# Patient Record
Sex: FEMALE | Race: WHITE | Employment: FULL TIME | URBAN - METROPOLITAN AREA
[De-identification: names, ages, dates, MRNs, and addresses within clinical notes are randomized per-mention and may not be internally consistent; named-entity substitution may affect disease eponyms.]

---

## 2017-01-04 ENCOUNTER — OFFICE VISIT (OUTPATIENT)
Dept: FAMILY MEDICINE CLINIC | Age: 52
End: 2017-01-04

## 2017-01-04 ENCOUNTER — TELEPHONE (OUTPATIENT)
Dept: FAMILY MEDICINE CLINIC | Age: 52
End: 2017-01-04

## 2017-01-04 VITALS
TEMPERATURE: 98 F | DIASTOLIC BLOOD PRESSURE: 76 MMHG | HEIGHT: 64 IN | WEIGHT: 158 LBS | SYSTOLIC BLOOD PRESSURE: 124 MMHG | HEART RATE: 70 BPM | BODY MASS INDEX: 26.98 KG/M2 | RESPIRATION RATE: 16 BRPM

## 2017-01-04 DIAGNOSIS — L23.9 ALLERGIC DERMATITIS: ICD-10-CM

## 2017-01-04 DIAGNOSIS — L30.9 ECZEMA, UNSPECIFIED TYPE: Primary | ICD-10-CM

## 2017-01-04 PROCEDURE — 99214 OFFICE O/P EST MOD 30 MIN: CPT | Performed by: FAMILY MEDICINE

## 2017-01-04 RX ORDER — METHYLPREDNISOLONE 4 MG/1
TABLET ORAL
Qty: 1 KIT | Refills: 0 | Status: SHIPPED | OUTPATIENT
Start: 2017-01-04 | End: 2017-01-14

## 2017-01-05 ENCOUNTER — PATIENT MESSAGE (OUTPATIENT)
Dept: FAMILY MEDICINE CLINIC | Age: 52
End: 2017-01-05

## 2017-01-06 RX ORDER — SCOLOPAMINE TRANSDERMAL SYSTEM 1 MG/1
1 PATCH, EXTENDED RELEASE TRANSDERMAL
Qty: 10 PATCH | Refills: 0 | Status: SHIPPED | OUTPATIENT
Start: 2017-01-06 | End: 2017-02-02 | Stop reason: ALTCHOICE

## 2017-01-06 RX ORDER — SCOLOPAMINE TRANSDERMAL SYSTEM 1 MG/1
1 PATCH, EXTENDED RELEASE TRANSDERMAL
Qty: 10 PATCH | Refills: 0 | Status: SHIPPED | OUTPATIENT
Start: 2017-01-06 | End: 2017-01-06

## 2017-01-12 ENCOUNTER — PATIENT MESSAGE (OUTPATIENT)
Dept: FAMILY MEDICINE CLINIC | Age: 52
End: 2017-01-12

## 2017-01-12 RX ORDER — PREDNISONE 10 MG/1
TABLET ORAL
Qty: 39 TABLET | Refills: 0 | Status: SHIPPED | OUTPATIENT
Start: 2017-01-12 | End: 2017-01-24

## 2017-01-30 ENCOUNTER — TELEPHONE (OUTPATIENT)
Dept: DERMATOLOGY | Age: 52
End: 2017-01-30

## 2017-02-02 ENCOUNTER — OFFICE VISIT (OUTPATIENT)
Dept: DERMATOLOGY | Age: 52
End: 2017-02-02

## 2017-02-02 DIAGNOSIS — L30.9 DERMATITIS: Primary | ICD-10-CM

## 2017-02-02 PROCEDURE — 99214 OFFICE O/P EST MOD 30 MIN: CPT | Performed by: DERMATOLOGY

## 2017-02-02 RX ORDER — FLUOCINONIDE 0.5 MG/G
OINTMENT TOPICAL
Qty: 60 G | Refills: 1 | Status: SHIPPED | OUTPATIENT
Start: 2017-02-02 | End: 2020-03-05 | Stop reason: SDUPTHER

## 2017-02-08 RX ORDER — FLUTICASONE PROPIONATE 110 UG/1
1 AEROSOL, METERED RESPIRATORY (INHALATION) PRN
Qty: 1 INHALER | Refills: 1 | Status: SHIPPED | OUTPATIENT
Start: 2017-02-08 | End: 2019-02-07 | Stop reason: SDUPTHER

## 2017-02-09 ENCOUNTER — TELEPHONE (OUTPATIENT)
Dept: FAMILY MEDICINE CLINIC | Age: 52
End: 2017-02-09

## 2017-02-09 DIAGNOSIS — J20.9 ACUTE BRONCHITIS, UNSPECIFIED ORGANISM: ICD-10-CM

## 2017-02-09 RX ORDER — DEXTROMETHORPHAN HYDROBROMIDE AND PROMETHAZINE HYDROCHLORIDE 15; 6.25 MG/5ML; MG/5ML
5 SYRUP ORAL 4 TIMES DAILY PRN
Qty: 240 ML | Refills: 0 | Status: SHIPPED | OUTPATIENT
Start: 2017-02-09 | End: 2017-02-16

## 2017-02-24 DIAGNOSIS — J45.21 ASTHMATIC BRONCHITIS, MILD INTERMITTENT, WITH ACUTE EXACERBATION: ICD-10-CM

## 2017-02-24 RX ORDER — MONTELUKAST SODIUM 10 MG/1
TABLET ORAL
Qty: 30 TABLET | Refills: 5 | Status: SHIPPED | OUTPATIENT
Start: 2017-02-24 | End: 2017-09-06 | Stop reason: SDUPTHER

## 2017-04-18 ENCOUNTER — PATIENT MESSAGE (OUTPATIENT)
Dept: FAMILY MEDICINE CLINIC | Age: 52
End: 2017-04-18

## 2017-04-19 ENCOUNTER — PATIENT MESSAGE (OUTPATIENT)
Dept: FAMILY MEDICINE CLINIC | Age: 52
End: 2017-04-19

## 2017-04-19 DIAGNOSIS — E03.9 ACQUIRED UNDERACTIVE THYROID: ICD-10-CM

## 2017-04-20 PROBLEM — E03.9 ACQUIRED UNDERACTIVE THYROID: Status: ACTIVE | Noted: 2017-04-20

## 2017-04-20 RX ORDER — LIOTHYRONINE SODIUM 5 UG/1
5 TABLET ORAL DAILY
Qty: 90 TABLET | Refills: 1 | Status: SHIPPED | OUTPATIENT
Start: 2017-04-20 | End: 2017-11-02 | Stop reason: DRUGHIGH

## 2017-05-02 ENCOUNTER — OFFICE VISIT (OUTPATIENT)
Dept: DERMATOLOGY | Age: 52
End: 2017-05-02

## 2017-05-02 DIAGNOSIS — Z12.83 SCREENING EXAM FOR SKIN CANCER: ICD-10-CM

## 2017-05-02 DIAGNOSIS — D22.9 MULTIPLE BENIGN NEVI: ICD-10-CM

## 2017-05-02 DIAGNOSIS — L30.9 DERMATITIS: Primary | ICD-10-CM

## 2017-05-02 PROCEDURE — 99213 OFFICE O/P EST LOW 20 MIN: CPT | Performed by: DERMATOLOGY

## 2017-05-16 ENCOUNTER — EMPLOYEE WELLNESS (OUTPATIENT)
Dept: OTHER | Age: 52
End: 2017-05-16

## 2017-05-16 LAB
CHOLESTEROL, TOTAL: 228 MG/DL (ref 0–199)
GLUCOSE BLD-MCNC: 89 MG/DL (ref 70–99)
HDLC SERPL-MCNC: 75 MG/DL (ref 40–60)
LDL CHOLESTEROL CALCULATED: 128 MG/DL
TRIGL SERPL-MCNC: 123 MG/DL (ref 0–150)

## 2017-09-06 DIAGNOSIS — J45.21 ASTHMATIC BRONCHITIS, MILD INTERMITTENT, WITH ACUTE EXACERBATION: ICD-10-CM

## 2017-09-07 RX ORDER — MONTELUKAST SODIUM 10 MG/1
TABLET ORAL
Qty: 30 TABLET | Refills: 1 | Status: SHIPPED | OUTPATIENT
Start: 2017-09-07 | End: 2017-11-03 | Stop reason: SDUPTHER

## 2017-11-02 ENCOUNTER — OFFICE VISIT (OUTPATIENT)
Dept: FAMILY MEDICINE CLINIC | Age: 52
End: 2017-11-02

## 2017-11-02 VITALS
SYSTOLIC BLOOD PRESSURE: 116 MMHG | BODY MASS INDEX: 30.05 KG/M2 | TEMPERATURE: 99 F | WEIGHT: 176 LBS | HEIGHT: 64 IN | DIASTOLIC BLOOD PRESSURE: 74 MMHG | RESPIRATION RATE: 22 BRPM | HEART RATE: 76 BPM | OXYGEN SATURATION: 97 %

## 2017-11-02 DIAGNOSIS — R10.32 LEFT LOWER QUADRANT PAIN: ICD-10-CM

## 2017-11-02 DIAGNOSIS — K57.92 ACUTE DIVERTICULITIS: Primary | ICD-10-CM

## 2017-11-02 LAB
BILIRUBIN, POC: ABNORMAL
BLOOD URINE, POC: ABNORMAL
CLARITY, POC: CLEAR
COLOR, POC: ABNORMAL
GLUCOSE URINE, POC: ABNORMAL
KETONES, POC: ABNORMAL
LEUKOCYTE EST, POC: ABNORMAL
NITRITE, POC: ABNORMAL
PH, POC: 7
PROTEIN, POC: ABNORMAL
SPECIFIC GRAVITY, POC: 1.01
UROBILINOGEN, POC: 0.2

## 2017-11-02 PROCEDURE — 81002 URINALYSIS NONAUTO W/O SCOPE: CPT | Performed by: FAMILY MEDICINE

## 2017-11-02 PROCEDURE — 99213 OFFICE O/P EST LOW 20 MIN: CPT | Performed by: FAMILY MEDICINE

## 2017-11-02 RX ORDER — CIPROFLOXACIN 500 MG/1
500 TABLET, FILM COATED ORAL 2 TIMES DAILY
Qty: 14 TABLET | Refills: 0 | Status: SHIPPED | OUTPATIENT
Start: 2017-11-02 | End: 2017-11-09

## 2017-11-02 RX ORDER — LIOTHYRONINE SODIUM 5 UG/1
10 TABLET ORAL DAILY
COMMUNITY
End: 2019-10-14 | Stop reason: SDUPTHER

## 2017-11-02 RX ORDER — METRONIDAZOLE 500 MG/1
500 TABLET ORAL 3 TIMES DAILY
Qty: 21 TABLET | Refills: 0 | Status: SHIPPED | OUTPATIENT
Start: 2017-11-02 | End: 2017-11-09

## 2017-11-02 NOTE — PROGRESS NOTES
11/2/2017    This is a 46 y.o. female   Chief Complaint   Patient presents with    Abdominal Pain     pt c/o lower abd pain that began over this past weekend. HPI  Works inpatient PT    Abdominal pain - started Sunday night, located in [facility]. Comes and goes. Did have a bout of diverticulitis x 2 a few years ago. When present it is sharp and 'grabbing'. Pt states it does feel similar to her past episodes. - Does feel like she has gas pains and is not completely emptying with BMs. No dysuria or frequency. No pain radiating to back. No genaro diarrhea or bloody stool.  - No vaginal symptoms, no bleeding.   - Last colonoscopy was a couple of years ago. Review of Systems   Constitutional: Negative for chills and fever. Respiratory: Negative for shortness of breath. Cardiovascular: Negative for chest pain. Gastrointestinal: Positive for abdominal pain and nausea. Negative for blood in stool and vomiting. Genitourinary: Negative for difficulty urinating, dysuria, vaginal bleeding and vaginal pain.        Patient Active Problem List   Diagnosis    Well adult health check    Hypercholesteremia due to HDL > 100    Low testosterone level in female    Elevated C-reactive protein (CRP)    Primary osteoarthritis of first carpometacarpal joint of left hand    Family history of Alzheimer's disease    Eczema    Acquired underactive thyroid        Past Medical History:   Diagnosis Date    Acquired underactive thyroid 4/20/2017    Asthma     when have colds goes into asthma    Diverticulitis 2006    colon 2006    Family history of Alzheimer's disease 11/15/2016    Hypercholesteremia due to HDL > 100 10/17/2013    no meds    Primary osteoarthritis of first carpometacarpal joint of left hand 9/25/2015       Past Surgical History:   Procedure Laterality Date    COSMETIC SURGERY      laser liposuction-abdomen and thighs    EYE SURGERY      corrective    FINGER TRIGGER RELEASE Left 5/19/16    Middle Finger NIGHTLY 30 tablet 5    fluocinonide (LIDEX) 0.05 % ointment Apply sparingly to affected area(s) bid prn for flares, up to 2 weeks at a time. Do not apply on cleared skin. 60 g 1    BIOTIN PO Take 1 tablet by mouth. No current facility-administered medications for this visit. Allergies   Allergen Reactions    Other Itching     thermersol-preservative for contact solution-pt states got itchy eyes    Penicillins Hives    Nickel Rash       /74 (Site: Left Arm, Position: Sitting, Cuff Size: Medium Adult)   Pulse 76   Temp 99 °F (37.2 °C) (Oral)   Resp 22   Ht 5' 4\" (1.626 m)   Wt 176 lb (79.8 kg)   SpO2 97%   BMI 30.21 kg/m²     Physical Exam   Constitutional: She is oriented to person, place, and time. She appears well-developed and well-nourished. HENT:   Head: Normocephalic and atraumatic. Mouth/Throat: Oropharynx is clear and moist.   Eyes: EOM are normal.   Neck: Normal range of motion. Cardiovascular: Normal rate, regular rhythm and normal heart sounds. Pulmonary/Chest: Effort normal and breath sounds normal.   Abdominal: Bowel sounds are normal. She exhibits no mass. There is no rebound and no guarding. TTP over LLQ  No flank pain   Musculoskeletal: Normal range of motion. She exhibits no edema or tenderness. Neurological: She is alert and oriented to person, place, and time. She has normal reflexes. Skin: Skin is warm and dry. No rash noted. Psychiatric: She has a normal mood and affect. Wt Readings from Last 3 Encounters:   11/02/17 176 lb (79.8 kg)   01/04/17 158 lb (71.7 kg)   11/15/16 158 lb (71.7 kg)       BP Readings from Last 3 Encounters:   11/02/17 116/74   01/04/17 124/76   11/15/16 130/80         Assessment/Plan:  Fanta was seen today for abdominal pain.     Diagnoses and all orders for this visit:    Acute diverticulitis  Based on her past hx of this and clinical presentation we discussed treatment of diverticulitis without obtaining CT scan, pt

## 2017-11-04 LAB — URINE CULTURE, ROUTINE: NORMAL

## 2017-11-04 ASSESSMENT — ENCOUNTER SYMPTOMS
NAUSEA: 1
SHORTNESS OF BREATH: 0
VOMITING: 0
BLOOD IN STOOL: 0
ABDOMINAL PAIN: 1

## 2017-11-20 ENCOUNTER — OFFICE VISIT (OUTPATIENT)
Dept: FAMILY MEDICINE CLINIC | Age: 52
End: 2017-11-20

## 2017-11-20 VITALS
HEART RATE: 78 BPM | DIASTOLIC BLOOD PRESSURE: 80 MMHG | BODY MASS INDEX: 30.05 KG/M2 | WEIGHT: 176 LBS | RESPIRATION RATE: 18 BRPM | HEIGHT: 64 IN | SYSTOLIC BLOOD PRESSURE: 122 MMHG | TEMPERATURE: 98.1 F

## 2017-11-20 DIAGNOSIS — L30.9 ECZEMA, UNSPECIFIED TYPE: ICD-10-CM

## 2017-11-20 DIAGNOSIS — E78.00 HYPERCHOLESTEREMIA: ICD-10-CM

## 2017-11-20 DIAGNOSIS — J45.20 MILD INTERMITTENT REACTIVE AIRWAY DISEASE WITHOUT COMPLICATION: ICD-10-CM

## 2017-11-20 DIAGNOSIS — Z23 NEED FOR PNEUMOCOCCAL VACCINATION: ICD-10-CM

## 2017-11-20 DIAGNOSIS — E03.9 ACQUIRED UNDERACTIVE THYROID: ICD-10-CM

## 2017-11-20 DIAGNOSIS — Z00.00 WELL ADULT EXAM: Primary | ICD-10-CM

## 2017-11-20 DIAGNOSIS — Z11.4 ENCOUNTER FOR SCREENING FOR HIV: ICD-10-CM

## 2017-11-20 DIAGNOSIS — Z11.59 NEED FOR HEPATITIS C SCREENING TEST: ICD-10-CM

## 2017-11-20 PROBLEM — J45.909 REACTIVE AIRWAY DISEASE: Status: ACTIVE | Noted: 2017-11-20

## 2017-11-20 PROCEDURE — 99396 PREV VISIT EST AGE 40-64: CPT | Performed by: NURSE PRACTITIONER

## 2017-11-20 PROCEDURE — 90471 IMMUNIZATION ADMIN: CPT | Performed by: NURSE PRACTITIONER

## 2017-11-20 PROCEDURE — 90732 PPSV23 VACC 2 YRS+ SUBQ/IM: CPT | Performed by: NURSE PRACTITIONER

## 2017-11-20 RX ORDER — CHLORAL HYDRATE 500 MG
1000 CAPSULE ORAL DAILY
COMMUNITY
End: 2020-11-17 | Stop reason: ALTCHOICE

## 2017-11-20 RX ORDER — ALBUTEROL SULFATE 90 UG/1
2 AEROSOL, METERED RESPIRATORY (INHALATION) EVERY 6 HOURS PRN
COMMUNITY
End: 2020-07-02

## 2017-11-20 ASSESSMENT — PATIENT HEALTH QUESTIONNAIRE - PHQ9
2. FEELING DOWN, DEPRESSED OR HOPELESS: 0
SUM OF ALL RESPONSES TO PHQ9 QUESTIONS 1 & 2: 0
1. LITTLE INTEREST OR PLEASURE IN DOING THINGS: 0
SUM OF ALL RESPONSES TO PHQ QUESTIONS 1-9: 0

## 2017-11-20 NOTE — PROGRESS NOTES
1.1%    Values used to calculate the score:      Age: 46 years      Sex: Female      Is Non- : No      Diabetic: No      Tobacco smoker: No      Systolic Blood Pressure: 762 mmHg      Is BP treated: No      HDL Cholesterol: 75 mg/dL      Total Cholesterol: 228 mg/dL       Advance Directive: N, <no information>    Immunization History   Administered Date(s) Administered    Influenza Vaccine, unspecified formulation 09/02/2016    Influenza Virus Vaccine 09/25/2013, 10/01/2015    Tdap (Boostrix, Adacel) 05/02/2014    Tetanus 08/22/2004       Allergies   Allergen Reactions    Other Itching     thermersol-preservative for contact solution-pt states got itchy eyes    Penicillins Hives    Nickel Rash     Outpatient Prescriptions Marked as Taking for the 11/20/17 encounter (Office Visit) with Bj Dawson CNP   Medication Sig Dispense Refill    albuterol sulfate  (90 Base) MCG/ACT inhaler Inhale 2 puffs into the lungs every 6 hours as needed for Wheezing      Omega-3 Fatty Acids (FISH OIL) 1000 MG CAPS Take 1,000 mg by mouth daily      Progesterone Micronized (PROGESTERONE PO) Take 1 capsule by mouth daily      montelukast (SINGULAIR) 10 MG tablet TAKE 1 TABLET BY MOUTH NIGHTLY 30 tablet 5    liothyronine (CYTOMEL) 5 MCG tablet Take 10 mcg by mouth daily       fluticasone (FLOVENT HFA) 110 MCG/ACT inhaler Inhale 1 puff into the lungs as needed (cough, wheeze) 1 Inhaler 1    fluocinonide (LIDEX) 0.05 % ointment Apply sparingly to affected area(s) bid prn for flares, up to 2 weeks at a time. Do not apply on cleared skin. 60 g 1    fluticasone (FLONASE) 50 MCG/ACT nasal spray 2 sprays by Nasal route daily 16 g 5    loratadine (CLARITIN) 10 MG tablet Take 10 mg by mouth daily as needed       Multiple Vitamin (MULTIVITAMINS PO) Take 1 tablet by mouth daily       Ascorbic Acid (VITAMIN C) 500 MG tablet Take 500 mg by mouth daily.       magnesium 30 MG tablet Take 400 mg by mouth daily       zinc gluconate 50 MG tablet Take 50 mg by mouth daily. Past Medical History:   Diagnosis Date    Acquired underactive thyroid 4/20/2017    Asthma     when have colds goes into asthma    Diverticulitis 2006    colon 2006    Family history of Alzheimer's disease 11/15/2016    Hypercholesteremia due to HDL > 100 10/17/2013    no meds    Primary osteoarthritis of first carpometacarpal joint of left hand 9/25/2015     Past Surgical History:   Procedure Laterality Date   [place] [place]      Dr. 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 Exercise: walking and cardiovascular equipment three times a week. Seatbelt use: Always    Physical therapist       Review of Systems:  All other systems reviewed and were negative except for what was noted in the HPI.      Physical Exam:   Vitals:    11/20/17 1441   BP: 122/80   Site: Right Arm   Position: Sitting   Cuff Size: Large Adult   Pulse: 78   Resp: 18   Temp: 98.1 °F (36.7 °C) TempSrc: Oral   Weight: 176 lb (79.8 kg)   Height: 5' 4\" (1.626 m)     Body mass index is 30.21 kg/m². Constitutional: She is oriented to person, place, and time. She appears well-developed and well-nourished. No distress. HEENT:   Head: Normocephalic and atraumatic. Right Ear: Tympanic membrane, external ear and ear canal normal.   Left Ear: Tympanic membrane, external ear and ear canal normal.   Nose: Nose normal.   Mouth/Throat: Oropharynx is clear and moist, and mucous membranes are normal.  There is no cervical adenopathy. Eyes: Conjunctivae and extraocular motions are normal. Pupils are equal, round, and reactive to light. Neck: Neck supple. No JVD present. Carotid bruit is not present. No mass and no thyromegaly present. Cardiovascular: Normal rate, regular rhythm, normal heart sounds and intact distal pulses. Exam reveals no gallop and no friction rub. No murmur heard. Pulmonary/Chest: Effort normal and breath sounds normal. No respiratory distress. She has no wheezes, rhonchi or rales. Abdominal: Soft, non-tender. Bowel sounds are normal. She exhibits no organomegaly, mass or bruit. Musculoskeletal: Normal range of motion, no synovitis. She exhibits no edema. Neurological: She is alert and oriented to person, place, and time. She has normal reflexes. No cranial nerve deficit. Coordination normal.   Skin: Skin is warm and dry. There is no rash or erythema. No suspicious lesions noted. Psychiatric: She has a normal mood and affect. Her speech is normal and behavior is normal. Judgment, cognition and memory are normal.     Assessment/Plan:    Fanta was seen today for annual exam.    Diagnoses and all orders for this visit:    Well adult exam  Healthy lifestyles reviewed: diet, aerobic exercise, sunscreen, self breast exams, vision and dental exams. Acquired underactive thyroid  On cytomel therapy. Followed by Dr. Lina Goins, unspecified type  Stable on PRN steroid ointment. Followed by Dr. Brandon Spivey due to HDL > 100  History of high HDL cholesterol. Lipids checked 5/17. Can recheck yearly. Mild RAD- controlled on singular, flovent PRN, and albuterol inhaler rare use. Need for pneumococcal vaccination  -     Pneumococcal polysaccharide vaccine 23-valent greater than or equal to 1yo subcutaneous/IM    Need for hepatitis C screening test  -     Hepatitis C Antibody; Future    Encounter for screening for HIV  -     HIV-1 AND HIV-2 ANTIBODIES; Future    GYN- continue to f/u with with Dr. Jhony Scott. Patient is due for yearly mammogram. She reports that she will schedule.

## 2017-11-24 DIAGNOSIS — Z11.4 ENCOUNTER FOR SCREENING FOR HIV: ICD-10-CM

## 2017-11-24 DIAGNOSIS — Z11.59 NEED FOR HEPATITIS C SCREENING TEST: ICD-10-CM

## 2017-11-24 LAB — HEPATITIS C ANTIBODY INTERPRETATION: NORMAL

## 2017-12-26 ENCOUNTER — HOSPITAL ENCOUNTER (OUTPATIENT)
Dept: WOMENS IMAGING | Age: 52
Discharge: HOME | End: 2017-12-26
Attending: OBSTETRICS & GYNECOLOGY | Admitting: OBSTETRICS & GYNECOLOGY

## 2017-12-26 DIAGNOSIS — Z12.31 VISIT FOR SCREENING MAMMOGRAM: ICD-10-CM

## 2018-01-24 ENCOUNTER — OFFICE VISIT (OUTPATIENT)
Dept: FAMILY MEDICINE CLINIC | Age: 53
End: 2018-01-24

## 2018-01-24 VITALS
TEMPERATURE: 98.6 F | DIASTOLIC BLOOD PRESSURE: 80 MMHG | RESPIRATION RATE: 20 BRPM | HEART RATE: 104 BPM | SYSTOLIC BLOOD PRESSURE: 118 MMHG | BODY MASS INDEX: 27.66 KG/M2 | HEIGHT: 64 IN | WEIGHT: 162 LBS | OXYGEN SATURATION: 98 %

## 2018-01-24 DIAGNOSIS — J06.9 VIRAL URI WITH COUGH: Primary | ICD-10-CM

## 2018-01-24 DIAGNOSIS — J34.89 SINUS PRESSURE: ICD-10-CM

## 2018-01-24 PROCEDURE — 99213 OFFICE O/P EST LOW 20 MIN: CPT | Performed by: FAMILY MEDICINE

## 2018-01-24 RX ORDER — DOXYCYCLINE HYCLATE 100 MG/1
100 CAPSULE ORAL 2 TIMES DAILY
Qty: 14 CAPSULE | Refills: 0 | Status: SHIPPED | OUTPATIENT
Start: 2018-01-27 | End: 2018-02-03

## 2018-01-24 ASSESSMENT — ENCOUNTER SYMPTOMS
SINUS PRESSURE: 1
SHORTNESS OF BREATH: 0
COUGH: 1

## 2018-01-24 NOTE — PROGRESS NOTES
1/24/2018    This is a 46 y.o. female   Chief Complaint   Patient presents with    Cough     cough x3 days     HPI   Cough that started a few days ago. Now is having a productive cough. No fever or chills. Does have some mild sinus pressure / discomfort with drainage. Works at hospital, so lots of sick contacts. No muscle aches, no fatigue.     - Leaving for a cruise in a few days and is worried she will get a sinus infection since she is prone to these. Asthma - has not felt wheezy or SOB with these symptoms. Review of Systems   Constitutional: Negative for chills and fever. HENT: Positive for congestion and sinus pressure. Respiratory: Positive for cough. Negative for shortness of breath.         Patient Active Problem List   Diagnosis    Well adult health check    Hypercholesteremia due to HDL > 100    Low testosterone level in female    Elevated C-reactive protein (CRP)    Primary osteoarthritis of first carpometacarpal joint of left hand    Family history of Alzheimer's disease    Eczema    Acquired underactive thyroid    Reactive airway disease        Past Medical History:   Diagnosis Date    Acquired underactive thyroid 4/20/2017    Asthma     when have colds goes into asthma    Diverticulitis 2006    colon 2006    Family history of Alzheimer's disease 11/15/2016    Hypercholesteremia due to HDL > 100 10/17/2013    no meds    Primary osteoarthritis of first carpometacarpal joint of left hand 9/25/2015       Past Surgical History:   Procedure Laterality Date   [place] [facility] liposuction-abdomen and thighs    EYE SURGERY      corrective    FINGER TRIGGER RELEASE Left 5/19/16    Middle Finger    HAND SURGERY Left 5/8/14    Middle Finger Ulnar Digital Nerve contusion and neuropraxia       Social History     Social History    Marital status: Single     Spouse name: N/A    Number of children: 0    Years of education: N/A Occupational History    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 Exercise: walking and cardiovascular equipment three times a week. Seatbelt use: Always    Physical therapist       Family History   Problem Relation Age of Onset    Cancer Mother      uterine sarcoma, skin non-melanoma    Cancer Father      skin non-melanoma       Current Outpatient Prescriptions   Medication Sig Dispense Refill    [START ON 1/27/2018] doxycycline hyclate (VIBRAMYCIN) 100 MG capsule Take 1 capsule by mouth 2 times daily for 7 days 14 capsule 0    albuterol sulfate  (90 Base) MCG/ACT inhaler Inhale 2 puffs into the lungs every 6 hours as needed for Wheezing      Omega-3 Fatty Acids (FISH OIL) 1000 MG CAPS Take 1,000 mg by mouth daily      Progesterone Micronized (PROGESTERONE PO) Take 1 capsule by mouth daily      montelukast (SINGULAIR) 10 MG tablet TAKE 1 TABLET BY MOUTH NIGHTLY 30 tablet 5    liothyronine (CYTOMEL) 5 MCG tablet Take 10 mcg by mouth daily       fluticasone (FLOVENT HFA) 110 MCG/ACT inhaler Inhale 1 puff into the lungs as needed (cough, wheeze) 1 Inhaler 1    fluocinonide (LIDEX) 0.05 % ointment Apply sparingly to affected area(s) bid prn for flares, up to 2 weeks at a time. Do not apply on cleared skin.  60 g 1    fluticasone (FLONASE) 50 MCG/ACT nasal spray 2 sprays by Nasal route daily 16 g 5    loratadine (CLARITIN) 10 MG tablet Take 10 mg by mouth daily as needed       Multiple Vitamin (MULTIVITAMINS PO) Take 1 tablet by mouth daily       Ascorbic Acid (VITAMIN C) 500 MG pressure that is now developing to get prescription filled. I handed her a paper prescription that is postdated to be filled 4 days out of her symptoms continue. She is okay with this plan. -     doxycycline hyclate (VIBRAMYCIN) 100 MG capsule;  Take 1 capsule by mouth 2 times daily for 7 days

## 2018-02-21 ENCOUNTER — PATIENT MESSAGE (OUTPATIENT)
Dept: FAMILY MEDICINE CLINIC | Age: 53
End: 2018-02-21

## 2018-02-22 RX ORDER — FLUTICASONE PROPIONATE 50 MCG
SPRAY, SUSPENSION (ML) NASAL
Qty: 16 G | Refills: 2 | Status: SHIPPED | OUTPATIENT
Start: 2018-02-22 | End: 2020-07-02

## 2018-03-12 ENCOUNTER — EMPLOYEE WELLNESS (OUTPATIENT)
Dept: OTHER | Age: 53
End: 2018-03-12

## 2018-03-12 LAB
CHOLESTEROL, TOTAL: 259 MG/DL (ref 0–199)
GLUCOSE BLD-MCNC: 105 MG/DL (ref 70–99)
HDLC SERPL-MCNC: 89 MG/DL (ref 40–60)
LDL CHOLESTEROL CALCULATED: 154 MG/DL
TRIGL SERPL-MCNC: 78 MG/DL (ref 0–150)

## 2018-03-20 VITALS — BODY MASS INDEX: 28.49 KG/M2 | WEIGHT: 166 LBS

## 2018-04-02 ENCOUNTER — TELEPHONE (OUTPATIENT)
Dept: FAMILY MEDICINE CLINIC | Age: 53
End: 2018-04-02

## 2018-04-02 VITALS — WEIGHT: 167 LBS | BODY MASS INDEX: 28.67 KG/M2

## 2018-04-03 ENCOUNTER — OFFICE VISIT (OUTPATIENT)
Dept: FAMILY MEDICINE CLINIC | Age: 53
End: 2018-04-03

## 2018-04-03 VITALS
HEART RATE: 76 BPM | TEMPERATURE: 98.6 F | SYSTOLIC BLOOD PRESSURE: 122 MMHG | RESPIRATION RATE: 16 BRPM | HEIGHT: 64 IN | BODY MASS INDEX: 28.34 KG/M2 | WEIGHT: 166 LBS | DIASTOLIC BLOOD PRESSURE: 80 MMHG

## 2018-04-03 DIAGNOSIS — H65.493 CHRONIC SEROMUCINOUS OTITIS MEDIA OF BOTH EARS: Primary | ICD-10-CM

## 2018-04-03 DIAGNOSIS — J30.9 CHRONIC ALLERGIC RHINITIS, UNSPECIFIED SEASONALITY, UNSPECIFIED TRIGGER: ICD-10-CM

## 2018-04-03 DIAGNOSIS — R73.9 HYPERGLYCEMIA: ICD-10-CM

## 2018-04-03 LAB — HBA1C MFR BLD: 5.3 %

## 2018-04-03 PROCEDURE — 99214 OFFICE O/P EST MOD 30 MIN: CPT | Performed by: FAMILY MEDICINE

## 2018-04-03 PROCEDURE — 83036 HEMOGLOBIN GLYCOSYLATED A1C: CPT | Performed by: FAMILY MEDICINE

## 2018-04-03 RX ORDER — PREDNISONE 10 MG/1
10 TABLET ORAL DAILY
Qty: 10 TABLET | Refills: 0 | Status: SHIPPED | OUTPATIENT
Start: 2018-04-03 | End: 2018-04-13

## 2018-05-03 DIAGNOSIS — J45.21 ASTHMATIC BRONCHITIS, MILD INTERMITTENT, WITH ACUTE EXACERBATION: ICD-10-CM

## 2018-05-03 RX ORDER — MONTELUKAST SODIUM 10 MG/1
TABLET ORAL
Qty: 30 TABLET | Refills: 5 | Status: SHIPPED | OUTPATIENT
Start: 2018-05-03 | End: 2020-07-02

## 2018-05-08 ENCOUNTER — PATIENT MESSAGE (OUTPATIENT)
Dept: FAMILY MEDICINE CLINIC | Age: 53
End: 2018-05-08

## 2018-05-08 DIAGNOSIS — J30.9 CHRONIC ALLERGIC RHINITIS, UNSPECIFIED SEASONALITY, UNSPECIFIED TRIGGER: ICD-10-CM

## 2018-05-08 DIAGNOSIS — H65.499 CHRONIC SEROMUCINOUS OTITIS MEDIA: Primary | ICD-10-CM

## 2018-05-15 ENCOUNTER — OFFICE VISIT (OUTPATIENT)
Dept: ENT CLINIC | Age: 53
End: 2018-05-15

## 2018-05-15 ENCOUNTER — PROCEDURE VISIT (OUTPATIENT)
Dept: AUDIOLOGY | Age: 53
End: 2018-05-15

## 2018-05-15 ENCOUNTER — OFFICE VISIT (OUTPATIENT)
Dept: AUDIOLOGY | Age: 53
End: 2018-05-15

## 2018-05-15 VITALS — SYSTOLIC BLOOD PRESSURE: 110 MMHG | DIASTOLIC BLOOD PRESSURE: 80 MMHG

## 2018-05-15 DIAGNOSIS — H93.93 EAR PROBLEM, BILATERAL: Primary | ICD-10-CM

## 2018-05-15 DIAGNOSIS — J30.9 ALLERGIC SINUSITIS: ICD-10-CM

## 2018-05-15 DIAGNOSIS — H68.003 EUSTACHIAN CATARRH, BILATERAL: Primary | ICD-10-CM

## 2018-05-15 PROCEDURE — 92567 TYMPANOMETRY: CPT | Performed by: AUDIOLOGIST

## 2018-05-15 PROCEDURE — 99203 OFFICE O/P NEW LOW 30 MIN: CPT | Performed by: OTOLARYNGOLOGY

## 2018-05-15 PROCEDURE — 92557 COMPREHENSIVE HEARING TEST: CPT | Performed by: AUDIOLOGIST

## 2018-05-15 RX ORDER — PREDNISONE 10 MG/1
TABLET ORAL
Qty: 25 TABLET | Refills: 0 | Status: SHIPPED | OUTPATIENT
Start: 2018-05-15 | End: 2018-06-12 | Stop reason: ALTCHOICE

## 2018-05-15 ASSESSMENT — ENCOUNTER SYMPTOMS
EYES NEGATIVE: 1
FACIAL SWELLING: 0
RHINORRHEA: 0
TROUBLE SWALLOWING: 0
RESPIRATORY NEGATIVE: 1
SINUS PRESSURE: 0
SINUS PAIN: 0
VOICE CHANGE: 0
SORE THROAT: 0
ALLERGIC/IMMUNOLOGIC NEGATIVE: 1

## 2018-05-30 ENCOUNTER — TELEPHONE (OUTPATIENT)
Dept: ENT CLINIC | Age: 53
End: 2018-05-30

## 2018-05-30 DIAGNOSIS — H68.003 EUSTACHIAN CATARRH, BILATERAL: Primary | ICD-10-CM

## 2018-05-30 RX ORDER — DOXYCYCLINE 100 MG/1
100 TABLET ORAL 2 TIMES DAILY
Qty: 20 TABLET | Refills: 0 | Status: SHIPPED | OUTPATIENT
Start: 2018-05-30 | End: 2018-06-09

## 2018-06-12 ENCOUNTER — OFFICE VISIT (OUTPATIENT)
Dept: ENT CLINIC | Age: 53
End: 2018-06-12

## 2018-06-12 VITALS — HEART RATE: 88 BPM | DIASTOLIC BLOOD PRESSURE: 82 MMHG | SYSTOLIC BLOOD PRESSURE: 120 MMHG

## 2018-06-12 DIAGNOSIS — M79.2 NEURITIS: Primary | ICD-10-CM

## 2018-06-12 PROCEDURE — 99213 OFFICE O/P EST LOW 20 MIN: CPT | Performed by: OTOLARYNGOLOGY

## 2018-06-12 RX ORDER — GABAPENTIN 300 MG/1
300 CAPSULE ORAL 2 TIMES DAILY
Qty: 28 CAPSULE | Refills: 0 | Status: SHIPPED | OUTPATIENT
Start: 2018-06-12 | End: 2018-06-26 | Stop reason: SDUPTHER

## 2018-06-26 ENCOUNTER — TELEPHONE (OUTPATIENT)
Dept: ENT CLINIC | Age: 53
End: 2018-06-26

## 2018-06-26 DIAGNOSIS — M79.2 NEURITIS: ICD-10-CM

## 2018-06-26 RX ORDER — GABAPENTIN 300 MG/1
300 CAPSULE ORAL 3 TIMES DAILY
Qty: 42 CAPSULE | Refills: 0 | Status: SHIPPED | OUTPATIENT
Start: 2018-06-26 | End: 2018-07-11 | Stop reason: SDUPTHER

## 2018-07-11 ENCOUNTER — TELEPHONE (OUTPATIENT)
Dept: ENT CLINIC | Age: 53
End: 2018-07-11

## 2018-07-11 DIAGNOSIS — J30.9 ALLERGIC SINUSITIS: Primary | ICD-10-CM

## 2018-07-11 DIAGNOSIS — M79.2 NEURITIS: ICD-10-CM

## 2018-07-11 RX ORDER — GABAPENTIN 300 MG/1
300 CAPSULE ORAL 2 TIMES DAILY
Qty: 28 CAPSULE | Refills: 1 | Status: SHIPPED | OUTPATIENT
Start: 2018-07-11 | End: 2018-09-19 | Stop reason: SDUPTHER

## 2018-08-07 ENCOUNTER — NURSE ONLY (OUTPATIENT)
Dept: ENT CLINIC | Age: 53
End: 2018-08-07

## 2018-08-07 ENCOUNTER — OFFICE VISIT (OUTPATIENT)
Dept: ENT CLINIC | Age: 53
End: 2018-08-07

## 2018-08-07 DIAGNOSIS — J30.9 ALLERGIC SINUSITIS: ICD-10-CM

## 2018-08-07 DIAGNOSIS — J30.0 VASOMOTOR RHINITIS: Primary | ICD-10-CM

## 2018-08-07 PROCEDURE — 99212 OFFICE O/P EST SF 10 MIN: CPT | Performed by: OTOLARYNGOLOGY

## 2018-08-07 PROCEDURE — 95004 PERQ TESTS W/ALRGNC XTRCS: CPT | Performed by: OTOLARYNGOLOGY

## 2018-08-07 RX ORDER — LEVOCETIRIZINE DIHYDROCHLORIDE 5 MG/1
5 TABLET, FILM COATED ORAL NIGHTLY
Qty: 15 TABLET | Refills: 1 | Status: SHIPPED | OUTPATIENT
Start: 2018-08-07 | End: 2018-09-06 | Stop reason: SDUPTHER

## 2018-08-07 RX ORDER — IPRATROPIUM BROMIDE 21 UG/1
2 SPRAY, METERED NASAL EVERY 12 HOURS
Qty: 1 BOTTLE | Refills: 3 | Status: SHIPPED | OUTPATIENT
Start: 2018-08-07 | End: 2019-05-20 | Stop reason: SDUPTHER

## 2018-08-07 NOTE — PROGRESS NOTES
I have reviewed the allergy test results with the patient directly. The results aren't is no obvious environmental allergen present. Findings, therefore, are those of vasomotor rhinitis. I have discussed this diagnosis with her and have suggested that she continue the gabapentin but we will add Xyzal and Atrovent spray. She will contact me in 2 weeks to determine response.

## 2018-08-21 ENCOUNTER — TELEPHONE (OUTPATIENT)
Dept: ENT CLINIC | Age: 53
End: 2018-08-21

## 2018-08-21 NOTE — TELEPHONE ENCOUNTER
From  [address] [facility] [facility] To  [facility] Clinical Staff Sent  8/21/2018 10:32 AM   Hi Dr Marilin Pemberton. I have not noticed any difference with the new medication except that my mouth has been more dry despite drinking plenty of water throughout the day. I still am taking the neurotin 1x/ day (at night) which seems to help. I do notice that when the weather changes I do feel a bit more pressure in my ears. I guess they are never going to be back to normal. Should I go back to the singular and Flonase and claritin when I finish this RX? The new antihistamine that you prescribed is not covered by [facility] insurance so if Claritin does the same thing it's cheaper. Or if you have any other suggestions I'm open.

## 2018-09-06 DIAGNOSIS — J30.0 VASOMOTOR RHINITIS: ICD-10-CM

## 2018-09-06 RX ORDER — LEVOCETIRIZINE DIHYDROCHLORIDE 5 MG/1
5 TABLET, FILM COATED ORAL NIGHTLY
Qty: 90 TABLET | Refills: 1 | Status: SHIPPED | OUTPATIENT
Start: 2018-09-06 | End: 2019-03-07 | Stop reason: SDUPTHER

## 2018-09-19 DIAGNOSIS — M79.2 NEURITIS: ICD-10-CM

## 2018-09-20 RX ORDER — GABAPENTIN 300 MG/1
300 CAPSULE ORAL 2 TIMES DAILY
Qty: 180 CAPSULE | Refills: 1 | Status: SHIPPED | OUTPATIENT
Start: 2018-09-20 | End: 2020-05-29 | Stop reason: SDUPTHER

## 2019-01-15 ENCOUNTER — TELEPHONE (OUTPATIENT)
Dept: FAMILY MEDICINE CLINIC | Age: 54
End: 2019-01-15

## 2019-01-15 RX ORDER — TIZANIDINE 4 MG/1
4 TABLET ORAL EVERY 8 HOURS PRN
Qty: 15 TABLET | Refills: 0 | Status: SHIPPED | OUTPATIENT
Start: 2019-01-15 | End: 2019-01-17 | Stop reason: SDUPTHER

## 2019-01-17 RX ORDER — TIZANIDINE 4 MG/1
TABLET ORAL
Qty: 15 TABLET | Refills: 0 | Status: SHIPPED | OUTPATIENT
Start: 2019-01-17 | End: 2020-07-02

## 2019-01-31 ENCOUNTER — HOSPITAL ENCOUNTER (OUTPATIENT)
Dept: WOMENS IMAGING | Age: 54
Discharge: HOME | End: 2019-01-31
Payer: COMMERCIAL

## 2019-01-31 DIAGNOSIS — Z12.31 VISIT FOR SCREENING MAMMOGRAM: ICD-10-CM

## 2019-01-31 PROCEDURE — 77063 BREAST TOMOSYNTHESIS BI: CPT

## 2019-02-07 ENCOUNTER — PATIENT MESSAGE (OUTPATIENT)
Dept: FAMILY MEDICINE CLINIC | Age: 54
End: 2019-02-07

## 2019-02-07 RX ORDER — FLUTICASONE PROPIONATE 110 UG/1
1 AEROSOL, METERED RESPIRATORY (INHALATION) PRN
Qty: 1 INHALER | Refills: 1 | Status: SHIPPED | OUTPATIENT
Start: 2019-02-07 | End: 2019-02-08 | Stop reason: SDUPTHER

## 2019-02-08 RX ORDER — FLUTICASONE PROPIONATE 110 UG/1
1 AEROSOL, METERED RESPIRATORY (INHALATION) 2 TIMES DAILY PRN
Qty: 1 INHALER | Refills: 1 | Status: SHIPPED | OUTPATIENT
Start: 2019-02-08 | End: 2020-07-02

## 2019-03-07 DIAGNOSIS — J30.0 VASOMOTOR RHINITIS: ICD-10-CM

## 2019-03-07 RX ORDER — LEVOCETIRIZINE DIHYDROCHLORIDE 5 MG/1
TABLET, FILM COATED ORAL
Qty: 90 TABLET | Refills: 1 | Status: SHIPPED | OUTPATIENT
Start: 2019-03-07 | End: 2019-09-05 | Stop reason: SDUPTHER

## 2019-03-11 ENCOUNTER — EMPLOYEE WELLNESS (OUTPATIENT)
Dept: OTHER | Age: 54
End: 2019-03-11

## 2019-03-11 LAB
CHOLESTEROL, TOTAL: 254 MG/DL (ref 0–199)
GLUCOSE BLD-MCNC: 93 MG/DL (ref 70–99)
HDLC SERPL-MCNC: 75 MG/DL (ref 40–60)
LDL CHOLESTEROL CALCULATED: 147 MG/DL
TRIGL SERPL-MCNC: 161 MG/DL (ref 0–150)

## 2019-03-20 VITALS — BODY MASS INDEX: 30.73 KG/M2 | WEIGHT: 179 LBS

## 2019-05-20 DIAGNOSIS — J30.0 VASOMOTOR RHINITIS: ICD-10-CM

## 2019-05-20 RX ORDER — IPRATROPIUM BROMIDE 21 UG/1
SPRAY, METERED NASAL
Qty: 30 ML | Refills: 3 | Status: SHIPPED | OUTPATIENT
Start: 2019-05-20 | End: 2020-03-05 | Stop reason: ALTCHOICE

## 2019-09-05 DIAGNOSIS — J30.0 VASOMOTOR RHINITIS: ICD-10-CM

## 2019-09-06 RX ORDER — LEVOCETIRIZINE DIHYDROCHLORIDE 5 MG/1
TABLET, FILM COATED ORAL
Qty: 90 TABLET | Refills: 1 | Status: SHIPPED | OUTPATIENT
Start: 2019-09-06 | End: 2019-10-14 | Stop reason: SDUPTHER

## 2019-09-11 LAB
ESTRADIOL LEVEL: 28 PG/ML
FOLLICLE STIMULATING HORMONE: 42.9 MIU/ML
PROGESTERONE LEVEL: <0.05 NG/ML

## 2019-10-14 ENCOUNTER — OFFICE VISIT (OUTPATIENT)
Dept: FAMILY MEDICINE CLINIC | Age: 54
End: 2019-10-14
Payer: COMMERCIAL

## 2019-10-14 VITALS
DIASTOLIC BLOOD PRESSURE: 78 MMHG | RESPIRATION RATE: 16 BRPM | BODY MASS INDEX: 29.02 KG/M2 | SYSTOLIC BLOOD PRESSURE: 122 MMHG | WEIGHT: 170 LBS | HEIGHT: 64 IN | HEART RATE: 82 BPM

## 2019-10-14 DIAGNOSIS — E78.00 HYPERCHOLESTEREMIA: ICD-10-CM

## 2019-10-14 DIAGNOSIS — Z00.00 WELL ADULT HEALTH CHECK: Primary | ICD-10-CM

## 2019-10-14 DIAGNOSIS — N93.9 VAGINA BLEEDING: ICD-10-CM

## 2019-10-14 DIAGNOSIS — R79.82 ELEVATED C-REACTIVE PROTEIN (CRP): ICD-10-CM

## 2019-10-14 DIAGNOSIS — E03.9 ACQUIRED UNDERACTIVE THYROID: ICD-10-CM

## 2019-10-14 DIAGNOSIS — Z23 NEEDS FLU SHOT: ICD-10-CM

## 2019-10-14 DIAGNOSIS — J30.0 VASOMOTOR RHINITIS: ICD-10-CM

## 2019-10-14 PROBLEM — R73.9 HYPERGLYCEMIA: Status: RESOLVED | Noted: 2018-04-03 | Resolved: 2019-10-14

## 2019-10-14 PROCEDURE — 99396 PREV VISIT EST AGE 40-64: CPT | Performed by: FAMILY MEDICINE

## 2019-10-14 RX ORDER — LEVOCETIRIZINE DIHYDROCHLORIDE 5 MG/1
TABLET, FILM COATED ORAL
Qty: 90 TABLET | Refills: 3 | Status: SHIPPED | OUTPATIENT
Start: 2019-10-14 | End: 2020-11-23

## 2019-10-14 RX ORDER — LIOTHYRONINE SODIUM 5 UG/1
10 TABLET ORAL DAILY
Qty: 180 TABLET | Refills: 3 | Status: SHIPPED | OUTPATIENT
Start: 2019-10-14 | End: 2020-10-21

## 2019-10-14 ASSESSMENT — PATIENT HEALTH QUESTIONNAIRE - PHQ9
SUM OF ALL RESPONSES TO PHQ QUESTIONS 1-9: 0
1. LITTLE INTEREST OR PLEASURE IN DOING THINGS: 0
SUM OF ALL RESPONSES TO PHQ QUESTIONS 1-9: 0
2. FEELING DOWN, DEPRESSED OR HOPELESS: 0
SUM OF ALL RESPONSES TO PHQ9 QUESTIONS 1 & 2: 0

## 2019-10-15 DIAGNOSIS — Z23 NEEDS FLU SHOT: ICD-10-CM

## 2019-10-15 DIAGNOSIS — N93.9 VAGINA BLEEDING: ICD-10-CM

## 2019-10-15 DIAGNOSIS — E78.00 HYPERCHOLESTEREMIA: ICD-10-CM

## 2019-10-15 DIAGNOSIS — R79.82 ELEVATED C-REACTIVE PROTEIN (CRP): ICD-10-CM

## 2019-10-15 LAB
A/G RATIO: 1.8 (ref 1.1–2.2)
ALBUMIN SERPL-MCNC: 4.5 G/DL (ref 3.4–5)
ALP BLD-CCNC: 53 U/L (ref 40–129)
ALT SERPL-CCNC: 13 U/L (ref 10–40)
ANION GAP SERPL CALCULATED.3IONS-SCNC: 15 MMOL/L (ref 3–16)
AST SERPL-CCNC: 27 U/L (ref 15–37)
BASOPHILS ABSOLUTE: 0 K/UL (ref 0–0.2)
BASOPHILS RELATIVE PERCENT: 0.7 %
BILIRUB SERPL-MCNC: 0.3 MG/DL (ref 0–1)
BUN BLDV-MCNC: 19 MG/DL (ref 7–20)
C-REACTIVE PROTEIN: 4.5 MG/L (ref 0–5.1)
CALCIUM SERPL-MCNC: 9.8 MG/DL (ref 8.3–10.6)
CHLORIDE BLD-SCNC: 98 MMOL/L (ref 99–110)
CO2: 25 MMOL/L (ref 21–32)
CREAT SERPL-MCNC: 0.6 MG/DL (ref 0.6–1.1)
EOSINOPHILS ABSOLUTE: 0 K/UL (ref 0–0.6)
EOSINOPHILS RELATIVE PERCENT: 0.9 %
GFR AFRICAN AMERICAN: >60
GFR NON-AFRICAN AMERICAN: >60
GLOBULIN: 2.5 G/DL
GLUCOSE BLD-MCNC: 93 MG/DL (ref 70–99)
HCT VFR BLD CALC: 39.9 % (ref 36–48)
HEMOGLOBIN: 13.6 G/DL (ref 12–16)
LYMPHOCYTES ABSOLUTE: 1.5 K/UL (ref 1–5.1)
LYMPHOCYTES RELATIVE PERCENT: 30.8 %
MCH RBC QN AUTO: 30.3 PG (ref 26–34)
MCHC RBC AUTO-ENTMCNC: 34 G/DL (ref 31–36)
MCV RBC AUTO: 89 FL (ref 80–100)
MONOCYTES ABSOLUTE: 0.4 K/UL (ref 0–1.3)
MONOCYTES RELATIVE PERCENT: 8.4 %
NEUTROPHILS ABSOLUTE: 2.9 K/UL (ref 1.7–7.7)
NEUTROPHILS RELATIVE PERCENT: 59.2 %
PDW BLD-RTO: 13.2 % (ref 12.4–15.4)
PLATELET # BLD: 243 K/UL (ref 135–450)
PMV BLD AUTO: 7.9 FL (ref 5–10.5)
POTASSIUM SERPL-SCNC: 4.6 MMOL/L (ref 3.5–5.1)
RBC # BLD: 4.48 M/UL (ref 4–5.2)
SODIUM BLD-SCNC: 138 MMOL/L (ref 136–145)
TOTAL PROTEIN: 7 G/DL (ref 6.4–8.2)
TSH SERPL DL<=0.05 MIU/L-ACNC: 1.65 UIU/ML (ref 0.27–4.2)
WBC # BLD: 4.8 K/UL (ref 4–11)

## 2020-01-23 ENCOUNTER — PATIENT MESSAGE (OUTPATIENT)
Dept: FAMILY MEDICINE CLINIC | Age: 55
End: 2020-01-23

## 2020-01-24 RX ORDER — SCOLOPAMINE TRANSDERMAL SYSTEM 1 MG/1
1 PATCH, EXTENDED RELEASE TRANSDERMAL
Qty: 10 PATCH | Refills: 0 | Status: SHIPPED | OUTPATIENT
Start: 2020-01-24 | End: 2021-01-23

## 2020-02-12 ENCOUNTER — HOSPITAL ENCOUNTER (OUTPATIENT)
Dept: WOMENS IMAGING | Age: 55
Discharge: HOME | End: 2020-02-12
Payer: COMMERCIAL

## 2020-02-12 PROCEDURE — 77063 BREAST TOMOSYNTHESIS BI: CPT

## 2020-03-03 NOTE — PROGRESS NOTES
[facility]) Dermatology  Joby Martinez  1965    54 y.o. female     Date of Visit: 3/5/2020    Last Visit: <3yrs    Chief Complaint: Skin check    History of Present Illness:  1. Here for skin/mole check. No new moles. No moles changing in size, shape, color. No moles associated w/ pain, bleeding, pruritus.   -Spends a lot of time in sun. Wears SPF 30+ sunscreen more regularly now. 2. Complains of a pruritic lesion on R lower leg     3. Reports a rough lesion on nose     Derm History:   -Dermatitis - lidex oint     Review of Systems:  Constitutional: Reports general sense of well-being. Skin: No interval severe sunburns. Allergies: Reviewed and updated. Past Medical History, Surgical History, Medications and Allergies reviewed.      Past Medical History:   Diagnosis Date    Acquired underactive thyroid 4/20/2017    Asthma     when have colds goes into asthma    Chronic allergic rhinitis 4/3/2018    Diverticulitis 2006    colon 2006    Family history of Alzheimer's disease 11/15/2016    Hypercholesteremia due to HDL > 100 10/17/2013    no meds    Primary osteoarthritis of first carpometacarpal joint of left hand 9/25/2015     Past Surgical History:   Procedure Laterality Date   [place] Holding liposuction-abdomen and thighs    EYE SURGERY      corrective    FINGER TRIGGER RELEASE Left 5/19/16    Middle Finger    HAND SURGERY Left 5/8/14    Middle Finger Ulnar Digital Nerve contusion and neuropraxia       Allergies   Allergen Reactions    Other Itching     thermersol-preservative for contact solution-pt states got itchy eyes    Penicillins Hives    Nickel Rash     Outpatient Medications Marked as Taking for the 3/5/20 encounter (Office Visit) with Teto Gomez MD   Medication Sig Dispense Refill    b complex vitamins capsule Take 1 capsule by mouth daily      levocetirizine (XYZAL) 5 MG tablet TAKE ONE TABLET BY MOUTH if indicated)     2. Neoplasm of uncertain behavior of skin - R/o inflamed seborrheic keratosis, R anterior lower leg   -Discussed possible diagnosis. Patient agreeable to biopsy. Verbal consent obtained after risks (infection, bleeding, scar), benefits and alternatives explained. -Area(s) to be biopsied were marked with a surgical pen. Site(s) were cleansed with alcohol. Local anesthesia achieved with 1% lidocaine with epinephrine/sodium bicarbonate. Shave biopsy performed with a razor blade. Hemostasis was achieved with aluminum chloride. The wound(s) were dressed with petrolatum and covered with a bandage. Specimen(s) sent to pathology. Pt educated re: risk of bleeding, infection, scar and wound care instructions. 3. Actinic keratosis(es)  -Edu re: relationship with chronic cumulative sun exposure, low premalignant potential.   -1 lesion(s) treated w/ liquid nitrogen x 2 cycles - nose. Edu re: risk of blister formation, discomfort, scar, hypopigmentation. Discussed wound care.

## 2020-03-03 NOTE — PATIENT INSTRUCTIONS
Protecting Yourself From the Sun    · Apply broad spectrum water resistant sunscreen with an SPF of at least 30 to exposed areas of the skin. Dont forget the ears and lips! Remember to reapply sunscreen about every 2 hours and after swimming or sweating. · Wear sun protective clothing. Swim shirts (aka. rash guards) are a great idea and negates the need to reapply sunscreen in those areas. · Seek the shade whenever possible especially between the hours of 10 am and 4 pm when the suns rays are the strongest.     · Avoid tanning beds       Biopsy Wound Care Instructions    · Keep the bandage in place for 24 hours. · Cleanse the wound with mild soapy water daily   Gently dry the area.  Apply Vaseline or petroleum jelly to the wound using a cotton tipped applicator.  Cover with a clean bandage.  Repeat this process until the biopsy site is healed.  If you had stitches placed, continue treating the site until the stitches are removed. Remember to make an appointment to return to have your stitches removed by our staff.  You may shower and bathe as usual.       ** Biopsy results generally take around 7 business days to come back. If you have not heard from us by then, please call the office at [phone] between 8AM and 4PM Monday through Friday. Cryosurgery (Freezing) Wound Care Instructions    AFTER THE PROCEDURE:    You will notice swelling and redness around the site. This is normal.    You may experience a sharp or sore feeling for the next several days. For this discomfort, you may take acetaminophen (Tylenol©).  A blister may develop at the treated area, sometimes as soon as by the end of the day. After several days, the blister will subside and a scab will form.  If the area is bumped or traumatized during the first few days following freezing, you may develop bleeding into the blister, forming a blood blister. This is nothing to be alarmed about.    If the blister is tense,

## 2020-03-05 ENCOUNTER — OFFICE VISIT (OUTPATIENT)
Dept: DERMATOLOGY | Age: 55
End: 2020-03-05
Payer: COMMERCIAL

## 2020-03-05 PROCEDURE — 99213 OFFICE O/P EST LOW 20 MIN: CPT | Performed by: DERMATOLOGY

## 2020-03-05 PROCEDURE — 11102 TANGNTL BX SKIN SINGLE LES: CPT | Performed by: DERMATOLOGY

## 2020-03-05 PROCEDURE — 17000 DESTRUCT PREMALG LESION: CPT | Performed by: DERMATOLOGY

## 2020-03-05 RX ORDER — FLUOCINONIDE 0.5 MG/G
OINTMENT TOPICAL
Qty: 30 G | Refills: 1 | Status: ON HOLD | OUTPATIENT
Start: 2020-03-05 | End: 2020-11-19

## 2020-03-05 RX ORDER — VITAMIN B COMPLEX
1 CAPSULE ORAL DAILY
Status: ON HOLD | COMMUNITY
End: 2020-10-04

## 2020-03-09 LAB — DERMATOLOGY PATHOLOGY REPORT: NORMAL

## 2020-05-01 RX ORDER — IPRATROPIUM BROMIDE 21 UG/1
SPRAY, METERED NASAL
Qty: 30 ML | Refills: 3 | Status: SHIPPED | OUTPATIENT
Start: 2020-05-01 | End: 2021-05-04

## 2020-05-28 ENCOUNTER — E-VISIT (OUTPATIENT)
Dept: FAMILY MEDICINE CLINIC | Age: 55
End: 2020-05-28
Payer: COMMERCIAL

## 2020-05-28 PROCEDURE — 99422 OL DIG E/M SVC 11-20 MIN: CPT | Performed by: FAMILY MEDICINE

## 2020-05-29 RX ORDER — BUPROPION HYDROCHLORIDE 150 MG/1
150 TABLET ORAL EVERY MORNING
Qty: 90 TABLET | Refills: 0 | Status: SHIPPED | OUTPATIENT
Start: 2020-05-29 | End: 2020-08-18

## 2020-05-29 RX ORDER — GABAPENTIN 100 MG/1
CAPSULE ORAL
Qty: 450 CAPSULE | Refills: 0 | Status: SHIPPED | OUTPATIENT
Start: 2020-05-29 | End: 2020-09-28

## 2020-05-29 NOTE — PROGRESS NOTES
E-VISIT Assessment and Plan:      Diagnosis Orders   1. Anxiety  buPROPion (WELLBUTRIN XL) 150 MG extended release tablet   2. Neuritis  gabapentin (NEURONTIN) 100 MG capsule   3. Menopausal vasomotor syndrome  gabapentin (NEURONTIN) 100 MG capsule     Let's restart the Wellbutrim. Rx sent to [facility]. Let's tweak the gabapentin to help with the hot flashes, mood and ears. Change to 100 mg in MA and 400 mg in PM. Let me know in 2 weeks if we need to increase further. Sent in the 100 mg size to [facility]. Please call to schedule video visit in 4 weeks. Kettymazin Julio in there! Prior to Visit Medications    Medication Sig Taking? Authorizing Provider   gabapentin (NEURONTIN) 100 MG capsule Take 1 in AM and 4 in PM Yes Agnes Farooq MD   buPROPion (WELLBUTRIN XL) 150 MG extended release tablet Take 1 tablet by mouth every morning Yes Agnes Farooq MD   ipratropium (ATROVENT) 0.03 % nasal spray INSTILL 2 SPRAYS IN EACH NOSTRIL EVERY 12 HOURS  Brian MD Usama   b complex vitamins capsule Take 1 capsule by mouth daily  Historical Provider, MD   fluocinonide (LIDEX) 0.05 % ointment Apply sparingly to affected area(s) bid prn for flares, up to 2 weeks at a time. Do not apply on cleared skin. Lisa Rain MD   scopolamine (TRANSDERM-SCOP, 1.5 MG,) transdermal patch Place 1 patch onto the skin every 72 hours Apply to behind ear starting 4 hours before travel.   Patient not taking: Reported on 3/5/2020  Agnes Farooq MD   levocetirizine (XYZAL) 5 MG tablet TAKE ONE TABLET BY MOUTH NIGHTLY  Agnes Farooq MD   liothyronine (CYTOMEL) 5 MCG tablet Take 2 tablets by mouth daily  Agnes Farooq MD   fluticasone (FLOVENT HFA) 110 MCG/ACT inhaler Inhale 1 puff into the lungs 2 times daily as needed (cough, wheeze)  Agnes Farooq MD   tiZANidine (ZANAFLEX) 4 MG tablet TAKE 1 TABLET BY MOUTH EVERY 8 HOURS AS NEEDED FOR SPASM  Agnes Farooq MD   montelukast (SINGULAIR) 10 MG tablet TAKE ONE TABLET BY MOUTH NIGHTLY  Jenifer

## 2020-07-02 ENCOUNTER — TELEMEDICINE (OUTPATIENT)
Dept: FAMILY MEDICINE CLINIC | Age: 55
End: 2020-07-02
Payer: COMMERCIAL

## 2020-07-02 PROCEDURE — 99213 OFFICE O/P EST LOW 20 MIN: CPT | Performed by: FAMILY MEDICINE

## 2020-07-02 ASSESSMENT — PATIENT HEALTH QUESTIONNAIRE - PHQ9
1. LITTLE INTEREST OR PLEASURE IN DOING THINGS: 0
SUM OF ALL RESPONSES TO PHQ QUESTIONS 1-9: 0
2. FEELING DOWN, DEPRESSED OR HOPELESS: 0
SUM OF ALL RESPONSES TO PHQ9 QUESTIONS 1 & 2: 0
SUM OF ALL RESPONSES TO PHQ QUESTIONS 1-9: 0

## 2020-07-02 NOTE — PROGRESS NOTES
TELEHEALTH EVALUATION -- Audio/Visual (During DKQXR-17 public health emergency)  VIDEO VISIT- patient and provider not at same location  Also present:none. FOLLOW-UP VISIT   Subjective:   HPI:   Chief Complaint   Patient presents with    6 Month Follow-Up    Patient here for follow-up of:  1. Anxiety    2. Acquired underactive thyroid    3. Neuritis    4. Menopausal vasomotor syndrome       Complaints: pt states Wellbutrin is working well and so is the gabapentin   Less tearful and irritable with wellbutrin. Sleep better and fewer hot flashes with jennifer    · Exercise: walking intermittently  · Taking medicines daily as directed? Yes  · Any side effects of medications? No    Review of Systems   General ROS: fever? No,    Night sweats? Yes  Ophthalmic ROS: change in vision? No  Endocrine ROS: fatigue? Yes fatigue is there Wellbutrin has helped a little    Unexpected weight changes? No  Respiratory ROS: cough? No   Wheezing? No  Cardiovascular ROS: chest pain? No   Shortness of breath? No  Neurological ROS: TIA or stroke symptoms? No   Numbness/tingling?  No    *Chief complaint, HPI, History and ROS provided by the medical assistant has been reviewed and verified by provider- Lance Ortega MD    CHART REVIEW  Health Maintenance   Topic Date Due    Shingles Vaccine (1 of 2) 10/14/2020 (Originally 2/6/2015)    Flu vaccine (1) 09/01/2020    TSH testing  10/15/2020    Cervical cancer screen  01/10/2022    Breast cancer screen  02/12/2022    Lipid screen  03/11/2024    DTaP/Tdap/Td vaccine (2 - Td) 05/02/2024    Colon cancer screen colonoscopy  04/27/2025    Hepatitis C screen  Completed    HIV screen  Completed    Hepatitis A vaccine  Aged Out    Hepatitis B vaccine  Aged Out    Hib vaccine  Aged Out    Meningococcal (ACWY) vaccine  Aged Out    Pneumococcal 0-64 years Vaccine  Aged Out     The 10-year ASCVD risk score (Yousif Dempsey, et al., 2013) is: 1.7%    Values used to calculate the score:      Age: skin non-melanoma     Social History     Tobacco Use    Smoking status: Never Smoker    Smokeless tobacco: Never Used    Tobacco comment: congrats   Substance Use Topics    Alcohol use: Yes     Alcohol/week: 5.0 standard drinks     Types: 5 Standard drinks or equivalent per week     Comment: socially    Drug use: No      LAST LABS  Cholesterol, Total   Date Value Ref Range Status   03/11/2019 254 (H) 0 - 199 mg/dL Final     LDL Calculated   Date Value Ref Range Status   03/11/2019 147 (H) <100 mg/dL Final     HDL   Date Value Ref Range Status   03/11/2019 75 (H) 40 - 60 mg/dL Final   03/22/2012 98 (H) 40 - 60 mg/dl Final     Triglycerides   Date Value Ref Range Status   03/11/2019 161 (H) 0 - 150 mg/dL Final     Lab Results   Component Value Date    GLUCOSE 93 10/15/2019     Lab Results   Component Value Date     10/15/2019    K 4.6 10/15/2019    CREATININE 0.6 10/15/2019     Lab Results   Component Value Date    WBC 4.8 10/15/2019    HGB 13.6 10/15/2019    HCT 39.9 10/15/2019    MCV 89.0 10/15/2019     10/15/2019     Lab Results   Component Value Date    ALT 13 10/15/2019    AST 27 10/15/2019    ALKPHOS 53 10/15/2019    BILITOT 0.3 10/15/2019     TSH (uIU/mL)   Date Value   10/15/2019 1.65     Lab Results   Component Value Date    LABA1C 5.3 04/03/2018      Objective:   PHYSICAL EXAM:  There were no vitals taken for this visit. BP Readings from Last 5 Encounters:   10/14/19 122/78   06/12/18 120/82   05/15/18 110/80   04/03/18 122/80   01/24/18 118/80     Wt Readings from Last 5 Encounters:   10/14/19 170 lb (77.1 kg)   03/11/19 179 lb (81.2 kg)   04/03/18 166 lb (75.3 kg)   03/12/18 167 lb (75.8 kg)   01/24/18 162 lb (73.5 kg)      GENERAL:   · well-developed, well-nourished, alert, no distress. EYES:   · External findings: lids and lashes normal and conjunctivae and sclerae normal  · Eyes: no periorbital cellulitis.   HENT:   · Normocephalic, atraumatic  · External nose and ears appear normal  · Mucous membranes are moist  · Hearing grossly normal.     NECK: No visible masses  LUNGS:    · Respiratory effort normal.  · No visualized signs of difficulty breathing or respiratory distress  SKIN: warm and dry  · No significant exanthematous lesions or discoloration noted on facial skin  PSYCH:    · Alert and oriented, able to follow commands  · Normal reasoning, insight good  · Normal affect  · No memory disturbance noted  NEURO:   No Facial Asymmetry (Cranial nerve 7 motor function) (limited exam to video visit)      No gaze palsy      Assessment and Plan:      Diagnosis Orders   1. Anxiety     2. Acquired underactive thyroid     3. Neuritis     4. Menopausal vasomotor syndrome     Plan below. INSTRUCTIONS  NEXT APPOINTMENT: Please schedule fasting annual physical (30 minutes) in 3 months. OK to have water and medications (except for diabetes medicines). Virtual Visit (video visit) encounter employed to address concerns as mentioned above. A caregiver was present when appropriate. Due to this being a TeleHealth encounter (During [facility] public health emergency), evaluation of the following organ systems was limited. Pursuant to the emergency declaration under the [address] [place] [address], [address] [place] [address] authority and the [employer] and [employer] Act, this Virtual Visit was conducted with patient's (and/or legal guardian's) consent, to reduce the patient's risk of exposure to COVID-19 and provide necessary medical care. The patient (and/or legal guardian) has also been advised to contact this office for worsening conditions or problems, and seek emergency medical treatment and/or call 911 if deemed necessary. Services were provided through a video synchronous discussion virtually to substitute for in-person clinic visit. Patient and provider were located at their individual homes.     minutes: 11-20 minutes were spent on the digital evaluation and management of this patient. --Carol Ann Godinez MD on 7/2/2020 at 8:55 AM    An electronic signature was used to authenticate this note.

## 2020-08-18 RX ORDER — BUPROPION HYDROCHLORIDE 150 MG/1
TABLET ORAL
Qty: 90 TABLET | Refills: 0 | Status: SHIPPED | OUTPATIENT
Start: 2020-08-18 | End: 2020-11-23

## 2020-09-28 RX ORDER — GABAPENTIN 100 MG/1
CAPSULE ORAL
Qty: 450 CAPSULE | Refills: 0 | Status: SHIPPED | OUTPATIENT
Start: 2020-09-28 | End: 2021-01-29

## 2020-10-03 ENCOUNTER — NURSE TRIAGE (OUTPATIENT)
Dept: OTHER | Facility: CLINIC | Age: 55
End: 2020-10-03

## 2020-10-04 ENCOUNTER — APPOINTMENT (OUTPATIENT)
Dept: GENERAL RADIOLOGY | Age: 55
DRG: 391 | End: 2020-10-04
Payer: COMMERCIAL

## 2020-10-04 ENCOUNTER — HOSPITAL ENCOUNTER (INPATIENT)
Age: 55
LOS: 5 days | Discharge: HOME | DRG: 391 | End: 2020-10-09
Attending: EMERGENCY MEDICINE | Admitting: INTERNAL MEDICINE
Payer: COMMERCIAL

## 2020-10-04 ENCOUNTER — APPOINTMENT (OUTPATIENT)
Dept: CT IMAGING | Age: 55
DRG: 391 | End: 2020-10-04
Payer: COMMERCIAL

## 2020-10-04 PROBLEM — K57.92 DIVERTICULITIS: Status: ACTIVE | Noted: 2020-10-04

## 2020-10-04 LAB
A/G RATIO: 1.1 (ref 1.1–2.2)
ABO/RH: NORMAL
ALBUMIN SERPL-MCNC: 3.7 G/DL (ref 3.4–5)
ALP BLD-CCNC: 83 U/L (ref 40–129)
ALT SERPL-CCNC: 10 U/L (ref 10–40)
ANION GAP SERPL CALCULATED.3IONS-SCNC: 13 MMOL/L (ref 3–16)
ANTIBODY SCREEN: NORMAL
APTT: 26.2 SEC (ref 24.2–36.2)
AST SERPL-CCNC: 26 U/L (ref 15–37)
BASOPHILS ABSOLUTE: 0.1 K/UL (ref 0–0.2)
BASOPHILS RELATIVE PERCENT: 0.8 %
BILIRUB SERPL-MCNC: 0.4 MG/DL (ref 0–1)
BILIRUBIN URINE: NEGATIVE
BLOOD, URINE: NEGATIVE
BUN BLDV-MCNC: 17 MG/DL (ref 7–20)
CALCIUM SERPL-MCNC: 9.1 MG/DL (ref 8.3–10.6)
CHLORIDE BLD-SCNC: 97 MMOL/L (ref 99–110)
CLARITY: ABNORMAL
CO2: 23 MMOL/L (ref 21–32)
COLOR: YELLOW
CREAT SERPL-MCNC: 0.7 MG/DL (ref 0.6–1.1)
EKG ATRIAL RATE: 115 BPM
EKG DIAGNOSIS: NORMAL
EKG P AXIS: 40 DEGREES
EKG P-R INTERVAL: 148 MS
EKG Q-T INTERVAL: 314 MS
EKG QRS DURATION: 74 MS
EKG QTC CALCULATION (BAZETT): 434 MS
EKG R AXIS: -13 DEGREES
EKG T AXIS: 3 DEGREES
EKG VENTRICULAR RATE: 115 BPM
EOSINOPHILS ABSOLUTE: 0 K/UL (ref 0–0.6)
EOSINOPHILS RELATIVE PERCENT: 0.3 %
EPITHELIAL CELLS, UA: 3 /HPF (ref 0–5)
GFR AFRICAN AMERICAN: >60
GFR NON-AFRICAN AMERICAN: >60
GLOBULIN: 3.5 G/DL
GLUCOSE BLD-MCNC: 129 MG/DL (ref 70–99)
GLUCOSE URINE: NEGATIVE MG/DL
HCT VFR BLD CALC: 40.2 % (ref 36–48)
HEMOGLOBIN: 13.4 G/DL (ref 12–16)
HYALINE CASTS: 5 /LPF (ref 0–8)
INR BLD: 1.1 (ref 0.86–1.14)
KETONES, URINE: 40 MG/DL
LEUKOCYTE ESTERASE, URINE: NEGATIVE
LIPASE: 30 U/L (ref 13–60)
LYMPHOCYTES ABSOLUTE: 0.4 K/UL (ref 1–5.1)
LYMPHOCYTES RELATIVE PERCENT: 4.9 %
MCH RBC QN AUTO: 29.1 PG (ref 26–34)
MCHC RBC AUTO-ENTMCNC: 33.5 G/DL (ref 31–36)
MCV RBC AUTO: 87 FL (ref 80–100)
MICROSCOPIC EXAMINATION: YES
MONOCYTES ABSOLUTE: 0.6 K/UL (ref 0–1.3)
MONOCYTES RELATIVE PERCENT: 6.8 %
NEUTROPHILS ABSOLUTE: 7.1 K/UL (ref 1.7–7.7)
NEUTROPHILS RELATIVE PERCENT: 87.2 %
NITRITE, URINE: NEGATIVE
PDW BLD-RTO: 13.8 % (ref 12.4–15.4)
PH UA: 5.5 (ref 5–8)
PLATELET # BLD: 318 K/UL (ref 135–450)
PMV BLD AUTO: 7.8 FL (ref 5–10.5)
POTASSIUM REFLEX MAGNESIUM: 4.3 MMOL/L (ref 3.5–5.1)
PROCALCITONIN: 0.32 NG/ML (ref 0–0.15)
PROTEIN UA: 30 MG/DL
PROTHROMBIN TIME: 12.8 SEC (ref 10–13.2)
RBC # BLD: 4.62 M/UL (ref 4–5.2)
RBC UA: 7 /HPF (ref 0–4)
SODIUM BLD-SCNC: 133 MMOL/L (ref 136–145)
SPECIFIC GRAVITY UA: 1.03 (ref 1–1.03)
TOTAL PROTEIN: 7.2 G/DL (ref 6.4–8.2)
URINE REFLEX TO CULTURE: ABNORMAL
URINE TYPE: ABNORMAL
UROBILINOGEN, URINE: 0.2 E.U./DL
WBC # BLD: 8.1 K/UL (ref 4–11)
WBC UA: 2 /HPF (ref 0–5)

## 2020-10-04 PROCEDURE — 71045 X-RAY EXAM CHEST 1 VIEW: CPT

## 2020-10-04 PROCEDURE — 1200000000 HC SEMI PRIVATE

## 2020-10-04 PROCEDURE — 2500000003 HC RX 250 WO HCPCS: Performed by: EMERGENCY MEDICINE

## 2020-10-04 PROCEDURE — 6360000004 HC RX CONTRAST MEDICATION: Performed by: EMERGENCY MEDICINE

## 2020-10-04 PROCEDURE — 96374 THER/PROPH/DIAG INJ IV PUSH: CPT

## 2020-10-04 PROCEDURE — 85730 THROMBOPLASTIN TIME PARTIAL: CPT

## 2020-10-04 PROCEDURE — 96375 TX/PRO/DX INJ NEW DRUG ADDON: CPT

## 2020-10-04 PROCEDURE — 84145 PROCALCITONIN (PCT): CPT

## 2020-10-04 PROCEDURE — 80053 COMPREHEN METABOLIC PANEL: CPT

## 2020-10-04 PROCEDURE — 94761 N-INVAS EAR/PLS OXIMETRY MLT: CPT

## 2020-10-04 PROCEDURE — 6360000002 HC RX W HCPCS: Performed by: NURSE PRACTITIONER

## 2020-10-04 PROCEDURE — 2580000003 HC RX 258: Performed by: INTERNAL MEDICINE

## 2020-10-04 PROCEDURE — 86901 BLOOD TYPING SEROLOGIC RH(D): CPT

## 2020-10-04 PROCEDURE — 99285 EMERGENCY DEPT VISIT HI MDM: CPT

## 2020-10-04 PROCEDURE — 86900 BLOOD TYPING SEROLOGIC ABO: CPT

## 2020-10-04 PROCEDURE — 2580000003 HC RX 258: Performed by: NURSE PRACTITIONER

## 2020-10-04 PROCEDURE — 81001 URINALYSIS AUTO W/SCOPE: CPT

## 2020-10-04 PROCEDURE — 86850 RBC ANTIBODY SCREEN: CPT

## 2020-10-04 PROCEDURE — 85610 PROTHROMBIN TIME: CPT

## 2020-10-04 PROCEDURE — 6360000002 HC RX W HCPCS

## 2020-10-04 PROCEDURE — 2500000003 HC RX 250 WO HCPCS: Performed by: INTERNAL MEDICINE

## 2020-10-04 PROCEDURE — 93005 ELECTROCARDIOGRAM TRACING: CPT | Performed by: EMERGENCY MEDICINE

## 2020-10-04 PROCEDURE — 6360000002 HC RX W HCPCS: Performed by: INTERNAL MEDICINE

## 2020-10-04 PROCEDURE — 83690 ASSAY OF LIPASE: CPT

## 2020-10-04 PROCEDURE — 85025 COMPLETE CBC W/AUTO DIFF WBC: CPT

## 2020-10-04 PROCEDURE — 96376 TX/PRO/DX INJ SAME DRUG ADON: CPT

## 2020-10-04 PROCEDURE — 74177 CT ABD & PELVIS W/CONTRAST: CPT

## 2020-10-04 PROCEDURE — 99222 1ST HOSP IP/OBS MODERATE 55: CPT | Performed by: SURGERY

## 2020-10-04 RX ORDER — SODIUM CHLORIDE, SODIUM LACTATE, POTASSIUM CHLORIDE, CALCIUM CHLORIDE 600; 310; 30; 20 MG/100ML; MG/100ML; MG/100ML; MG/100ML
INJECTION, SOLUTION INTRAVENOUS CONTINUOUS
Status: DISCONTINUED | OUTPATIENT
Start: 2020-10-04 | End: 2020-10-09

## 2020-10-04 RX ORDER — 0.9 % SODIUM CHLORIDE 0.9 %
1000 INTRAVENOUS SOLUTION INTRAVENOUS ONCE
Status: COMPLETED | OUTPATIENT
Start: 2020-10-04 | End: 2020-10-04

## 2020-10-04 RX ORDER — ONDANSETRON 2 MG/ML
4 INJECTION INTRAMUSCULAR; INTRAVENOUS EVERY 6 HOURS PRN
Status: DISCONTINUED | OUTPATIENT
Start: 2020-10-04 | End: 2020-10-07

## 2020-10-04 RX ORDER — ONDANSETRON 4 MG/1
4 TABLET, ORALLY DISINTEGRATING ORAL EVERY 8 HOURS PRN
Status: DISCONTINUED | OUTPATIENT
Start: 2020-10-04 | End: 2020-10-09 | Stop reason: HOSPADM

## 2020-10-04 RX ORDER — SODIUM CHLORIDE 0.9 % (FLUSH) 0.9 %
10 SYRINGE (ML) INJECTION EVERY 12 HOURS SCHEDULED
Status: DISCONTINUED | OUTPATIENT
Start: 2020-10-04 | End: 2020-10-09 | Stop reason: HOSPADM

## 2020-10-04 RX ORDER — FLUCONAZOLE 2 MG/ML
200 INJECTION, SOLUTION INTRAVENOUS EVERY 24 HOURS
Status: DISCONTINUED | OUTPATIENT
Start: 2020-10-04 | End: 2020-10-09

## 2020-10-04 RX ORDER — MAGNESIUM SULFATE IN WATER 40 MG/ML
2 INJECTION, SOLUTION INTRAVENOUS PRN
Status: DISCONTINUED | OUTPATIENT
Start: 2020-10-04 | End: 2020-10-09 | Stop reason: HOSPADM

## 2020-10-04 RX ORDER — ONDANSETRON 2 MG/ML
4 INJECTION INTRAMUSCULAR; INTRAVENOUS ONCE
Status: COMPLETED | OUTPATIENT
Start: 2020-10-04 | End: 2020-10-04

## 2020-10-04 RX ORDER — POTASSIUM CHLORIDE 7.45 MG/ML
10 INJECTION INTRAVENOUS PRN
Status: DISCONTINUED | OUTPATIENT
Start: 2020-10-04 | End: 2020-10-09 | Stop reason: HOSPADM

## 2020-10-04 RX ORDER — SODIUM CHLORIDE 0.9 % (FLUSH) 0.9 %
10 SYRINGE (ML) INJECTION PRN
Status: DISCONTINUED | OUTPATIENT
Start: 2020-10-04 | End: 2020-10-09 | Stop reason: HOSPADM

## 2020-10-04 RX ORDER — MORPHINE SULFATE 2 MG/ML
2 INJECTION, SOLUTION INTRAMUSCULAR; INTRAVENOUS EVERY 4 HOURS PRN
Status: DISCONTINUED | OUTPATIENT
Start: 2020-10-04 | End: 2020-10-09 | Stop reason: HOSPADM

## 2020-10-04 RX ORDER — MORPHINE SULFATE 4 MG/ML
4 INJECTION, SOLUTION INTRAMUSCULAR; INTRAVENOUS ONCE
Status: DISCONTINUED | OUTPATIENT
Start: 2020-10-04 | End: 2020-10-04

## 2020-10-04 RX ORDER — MORPHINE SULFATE 4 MG/ML
4 INJECTION, SOLUTION INTRAMUSCULAR; INTRAVENOUS ONCE
Status: COMPLETED | OUTPATIENT
Start: 2020-10-04 | End: 2020-10-04

## 2020-10-04 RX ORDER — CIPROFLOXACIN 2 MG/ML
400 INJECTION, SOLUTION INTRAVENOUS ONCE
Status: DISCONTINUED | OUTPATIENT
Start: 2020-10-04 | End: 2020-10-04

## 2020-10-04 RX ORDER — MORPHINE SULFATE 2 MG/ML
INJECTION, SOLUTION INTRAMUSCULAR; INTRAVENOUS
Status: COMPLETED
Start: 2020-10-04 | End: 2020-10-04

## 2020-10-04 RX ADMIN — MORPHINE SULFATE 2 MG: 2 INJECTION, SOLUTION INTRAMUSCULAR; INTRAVENOUS at 18:51

## 2020-10-04 RX ADMIN — SODIUM CHLORIDE, POTASSIUM CHLORIDE, SODIUM LACTATE AND CALCIUM CHLORIDE: 600; 310; 30; 20 INJECTION, SOLUTION INTRAVENOUS at 07:33

## 2020-10-04 RX ADMIN — ONDANSETRON 4 MG: 2 INJECTION INTRAMUSCULAR; INTRAVENOUS at 02:00

## 2020-10-04 RX ADMIN — METRONIDAZOLE 500 MG: 500 INJECTION, SOLUTION INTRAVENOUS at 13:26

## 2020-10-04 RX ADMIN — SODIUM CHLORIDE 1000 ML: 9 INJECTION, SOLUTION INTRAVENOUS at 04:00

## 2020-10-04 RX ADMIN — IOPAMIDOL 75 ML: 755 INJECTION, SOLUTION INTRAVENOUS at 03:47

## 2020-10-04 RX ADMIN — FLUCONAZOLE 200 MG: 200 INJECTION, SOLUTION INTRAVENOUS at 10:01

## 2020-10-04 RX ADMIN — IOHEXOL 50 ML: 240 INJECTION, SOLUTION INTRATHECAL; INTRAVASCULAR; INTRAVENOUS; ORAL at 03:48

## 2020-10-04 RX ADMIN — METRONIDAZOLE 500 MG: 500 INJECTION, SOLUTION INTRAVENOUS at 06:26

## 2020-10-04 RX ADMIN — MORPHINE SULFATE 4 MG: 4 INJECTION, SOLUTION INTRAMUSCULAR; INTRAVENOUS at 02:00

## 2020-10-04 RX ADMIN — Medication 10 ML: at 22:59

## 2020-10-04 RX ADMIN — MORPHINE SULFATE 2 MG: 2 INJECTION, SOLUTION INTRAMUSCULAR; INTRAVENOUS at 22:56

## 2020-10-04 RX ADMIN — SODIUM CHLORIDE, POTASSIUM CHLORIDE, SODIUM LACTATE AND CALCIUM CHLORIDE: 600; 310; 30; 20 INJECTION, SOLUTION INTRAVENOUS at 18:54

## 2020-10-04 RX ADMIN — MORPHINE SULFATE 2 MG: 2 INJECTION, SOLUTION INTRAMUSCULAR; INTRAVENOUS at 14:06

## 2020-10-04 RX ADMIN — CEFTRIAXONE 1 G: 1 INJECTION, POWDER, FOR SOLUTION INTRAMUSCULAR; INTRAVENOUS at 07:54

## 2020-10-04 RX ADMIN — METRONIDAZOLE 500 MG: 500 INJECTION, SOLUTION INTRAVENOUS at 21:16

## 2020-10-04 RX ADMIN — Medication 10 ML: at 21:20

## 2020-10-04 RX ADMIN — SODIUM CHLORIDE 1000 ML: 9 INJECTION, SOLUTION INTRAVENOUS at 01:59

## 2020-10-04 RX ADMIN — MORPHINE SULFATE 4 MG: 2 INJECTION, SOLUTION INTRAMUSCULAR; INTRAVENOUS at 05:10

## 2020-10-04 RX ADMIN — MORPHINE SULFATE 2 MG: 2 INJECTION, SOLUTION INTRAMUSCULAR; INTRAVENOUS at 10:06

## 2020-10-04 ASSESSMENT — PAIN DESCRIPTION - FREQUENCY
FREQUENCY: CONTINUOUS

## 2020-10-04 ASSESSMENT — PAIN SCALES - GENERAL
PAINLEVEL_OUTOF10: 6
PAINLEVEL_OUTOF10: 8
PAINLEVEL_OUTOF10: 8
PAINLEVEL_OUTOF10: 6
PAINLEVEL_OUTOF10: 2
PAINLEVEL_OUTOF10: 7
PAINLEVEL_OUTOF10: 6
PAINLEVEL_OUTOF10: 6
PAINLEVEL_OUTOF10: 8
PAINLEVEL_OUTOF10: 7

## 2020-10-04 ASSESSMENT — PAIN - FUNCTIONAL ASSESSMENT: PAIN_FUNCTIONAL_ASSESSMENT: ACTIVITIES ARE NOT PREVENTED

## 2020-10-04 ASSESSMENT — PAIN DESCRIPTION - PAIN TYPE
TYPE: ACUTE PAIN

## 2020-10-04 ASSESSMENT — PAIN DESCRIPTION - ONSET
ONSET: GRADUAL
ONSET: ON-GOING
ONSET: ON-GOING
ONSET: GRADUAL

## 2020-10-04 ASSESSMENT — PAIN DESCRIPTION - PROGRESSION
CLINICAL_PROGRESSION: NOT CHANGED

## 2020-10-04 ASSESSMENT — PAIN DESCRIPTION - ORIENTATION
ORIENTATION: RIGHT;LEFT;LOWER
ORIENTATION: RIGHT;LEFT;LOWER;UPPER
ORIENTATION: RIGHT;LEFT;LOWER
ORIENTATION: RIGHT;LEFT;LOWER

## 2020-10-04 ASSESSMENT — ENCOUNTER SYMPTOMS
VOMITING: 0
DIARRHEA: 0
BACK PAIN: 0
COLOR CHANGE: 0
CONSTIPATION: 0
NAUSEA: 1
ABDOMINAL PAIN: 1
ABDOMINAL DISTENTION: 0
BLOOD IN STOOL: 0
COUGH: 0
SHORTNESS OF BREATH: 0

## 2020-10-04 ASSESSMENT — PAIN DESCRIPTION - DESCRIPTORS
DESCRIPTORS: SHARP
DESCRIPTORS: SHARP;ACHING

## 2020-10-04 ASSESSMENT — PAIN DESCRIPTION - LOCATION
LOCATION: ABDOMEN

## 2020-10-04 NOTE — PROGRESS NOTES
Patient admitted overnight. Patient seen and examined. Chart reviewed. Acute diverticulitis with perforation  -N.p.o.  -IV fluids  -pain control  -Continue antibiotics. Given perforation will add antifungal coverage  -Surgery consulted  -If pain gets worse will need stat CT.     Grant Mata MD

## 2020-10-04 NOTE — ED PROVIDER NOTES
reviewed and are negative. Positives and Pertinent negatives as per HPI. Except as noted above in the ROS, all other systems were reviewed and negative. PAST MEDICAL HISTORY     Past Medical History:   Diagnosis Date    Acquired underactive thyroid 4/20/2017    Asthma     when have colds goes into asthma    Chronic allergic rhinitis 4/3/2018    Diverticulitis 2006    colon 2006    Family history of Alzheimer's disease 11/15/2016    Hypercholesteremia due to HDL > 100 10/17/2013    no meds    Primary osteoarthritis of first carpometacarpal joint of left hand 9/25/2015         SURGICAL HISTORY     Past Surgical History:   Procedure Laterality Date   Wilver Cho      laser liposuction-abdomen and thighs    EYE SURGERY      corrective    FINGER TRIGGER RELEASE Left 5/19/16    Middle Finger    HAND SURGERY Left 5/8/14    Middle Finger Ulnar Digital Nerve contusion and neuropraxia         CURRENTMEDICATIONS       Current Discharge Medication List      CONTINUE these medications which have NOT CHANGED    Details   gabapentin (NEURONTIN) 100 MG capsule TAKE 1 CAPSULE BY MOUTH EVERY MORNING AND TAKE 4 CAPSULES BY MOUTH EVERY EVENING  Qty: 450 capsule, Refills: 0    Associated Diagnoses: Neuritis; Menopausal vasomotor syndrome      buPROPion (WELLBUTRIN XL) 150 MG extended release tablet TAKE ONE TABLET BY MOUTH EVERY MORNING  Qty: 90 tablet, Refills: 0    Associated Diagnoses: Anxiety      ipratropium (ATROVENT) 0.03 % nasal spray INSTILL 2 SPRAYS IN EACH NOSTRIL EVERY 12 HOURS  Qty: 30 mL, Refills: 3    Associated Diagnoses: Vasomotor rhinitis      fluocinonide (LIDEX) 0.05 % ointment Apply sparingly to affected area(s) bid prn for flares, up to 2 weeks at a time. Do not apply on cleared skin.   Qty: 30 g, Refills: 1      levocetirizine (XYZAL) 5 MG tablet TAKE ONE TABLET BY MOUTH NIGHTLY  Qty: 90 tablet, Refills: 3    Associated Diagnoses: Vasomotor rhinitis liothyronine (CYTOMEL) 5 MCG tablet Take 2 tablets by mouth daily  Qty: 180 tablet, Refills: 3    Associated Diagnoses: Acquired underactive thyroid      loratadine (CLARITIN) 10 MG tablet Take 10 mg by mouth daily as needed       Multiple Vitamin (MULTIVITAMINS PO) Take 1 tablet by mouth daily       Ascorbic Acid (VITAMIN C) 500 MG tablet Take 500 mg by mouth daily. magnesium 30 MG tablet Take 400 mg by mouth daily       zinc gluconate 50 MG tablet Take 50 mg by mouth daily. scopolamine (TRANSDERM-SCOP, 1.5 MG,) transdermal patch Place 1 patch onto the skin every 72 hours Apply to behind ear starting 4 hours before travel. Qty: 10 patch, Refills: 0      Omega-3 Fatty Acids (FISH OIL) 1000 MG CAPS Take 1,000 mg by mouth daily               ALLERGIES     Other; Penicillins; and Nickel    FAMILYHISTORY       Family History   Problem Relation Age of Onset    Cancer Mother         uterine sarcoma, skin non-melanoma    Cancer Father         skin non-melanoma          SOCIAL HISTORY       Social History     Tobacco Use    Smoking status: Never Smoker    Smokeless tobacco: Never Used    Tobacco comment: congrats   Substance Use Topics    Alcohol use: Yes     Alcohol/week: 5.0 standard drinks     Types: 5 Standard drinks or equivalent per week     Comment: socially    Drug use: No       SCREENINGS    [place] Coma Scale  Eye Opening: Spontaneous  Best Verbal Response: Oriented  Best Motor Response: Obeys commands  [place] Coma Scale Score: 15        PHYSICAL EXAM    (up to 7 for level 4, 8 or more for level 5)     ED Triage Vitals [10/04/20 0104]   BP Temp Temp src Pulse Resp SpO2 Height Weight   126/79 98.3 °F (36.8 °C) -- 134 16 96 % 5' 4\" (1.626 m) 179 lb (81.2 kg)       Physical Exam  Vitals signs and nursing note reviewed. Constitutional:       General: She is in acute distress (secondary to pain). Appearance: Normal appearance. She is well-developed. She is ill-appearing.  She is not toxic-appearing. HENT:      Head: Normocephalic and atraumatic. Eyes:      General: No scleral icterus. Conjunctiva/sclera: Conjunctivae normal.   Neck:      Musculoskeletal: Normal range of motion. Vascular: No JVD. Cardiovascular:      Rate and Rhythm: Regular rhythm. Tachycardia present. Heart sounds: Normal heart sounds. Pulmonary:      Effort: Pulmonary effort is normal. No respiratory distress. Breath sounds: Normal breath sounds. Abdominal:      General: Bowel sounds are normal. There is no distension. Palpations: Abdomen is soft. Abdomen is not rigid. Tenderness: There is abdominal tenderness. There is guarding. There is no right CVA tenderness, left CVA tenderness or rebound. Musculoskeletal: Normal range of motion. Skin:     General: Skin is warm and dry. Findings: No rash. Neurological:      General: No focal deficit present. Mental Status: She is alert and oriented to person, place, and time.    Psychiatric:         Mood and Affect: Mood normal.         DIAGNOSTIC RESULTS   LABS:    Labs Reviewed   CBC WITH AUTO DIFFERENTIAL - Abnormal; Notable for the following components:       Result Value    Lymphocytes Absolute 0.4 (*)     All other components within normal limits    Narrative:     Performed at:  [address] [address]   Phone [phone]   COMPREHENSIVE METABOLIC PANEL W/ REFLEX TO MG FOR LOW K - Abnormal; Notable for the following components:    Sodium 133 (*)     Chloride 97 (*)     Glucose 129 (*)     All other components within normal limits    Narrative:     Performed at:  [facility]  [address] [facility] [address] [address]   Phone [phone]   URINE RT REFLEX TO CULTURE - Abnormal; Notable for the following components:    Clarity, UA CLOUDY (*)     Ketones, Urine 40 (*)     Protein, UA 30 (*)     All other components within normal limits    Narrative:     Performed at:  [facility]  [address], [place] [address]   Phone [phone]   PROCALCITONIN - Abnormal; Notable for the following components:    Procalcitonin 0.32 (*)     All other components within normal limits    Narrative:     Performed at:  [facility]  [address], [place] [address]   Phone [phone]   MICROSCOPIC URINALYSIS - Abnormal; Notable for the following components:    RBC, UA 7 (*)     All other components within normal limits    Narrative:     Performed at:  [facility]  [address] [place] [address]   Phone [phone]   LIPASE    Narrative:     Performed at:  [facility]  [address] [place] [address]   Phone [phone]   PROTIME-INR    Narrative:     Performed at:  [facility]  [address] [place] [address] [employer] [address]   Phone [phone]   APTT    Narrative:     Performed at:  [address] [facility] [place] [address]   Phone [phone]   TYPE AND SCREEN    Narrative:     Performed at:  [address] [address]   Phone [phone]       All other labs were within normal range or not returned as of this dictation. EKG: All EKG's are interpreted by the Emergency Department Physician in the absence of a cardiologist.  Please see their note for interpretation of EKG.       RADIOLOGY:   Non-plain film images such as CT, Ultrasound and MRI are read by the radiologist. Plain radiographic images are visualized and preliminarily interpreted by the ED Provider with the below findings:        Interpretation per the Radiologist below, if available at the time of this note:    XR CHEST PORTABLE   Final Result   No acute cardiopulmonary findings. CT ABDOMEN PELVIS W IV CONTRAST Additional Contrast? Oral   Preliminary Result   1. Acute perforated sigmoid diverticulitis with a small amount of   pneumoperitoneum. There is a small amount of free fluid in the pelvis   without drainable fluid collection. Critical results were called by Dr. Kalen Bowens. Yang Lazar MD to [facility] on   10/4/2020 at 04:26. No results found. PROCEDURES   Unless otherwise noted below, none     Procedures    CRITICAL CARE TIME   CRITICAL CARE NOTE:  There was a high probability of clinically significant life-threatening deterioration of the patient's condition requiring my urgent intervention. Total critical care time was at least 32 minutes. This includes vital sign monitoring, pulse oximetry monitoring, telemetry monitoring, clinical response to the IV medications, reviewing the nursing notes, consultation time, dictation/documentation time, and interpretation of the labwork. This excludes any separately billable procedures performed.       CONSULTS:  IP CONSULT TO GENERAL SURGERY  IP CONSULT TO SPIRITUAL SERVICES      EMERGENCY DEPARTMENT COURSE and DIFFERENTIAL DIAGNOSIS/MDM:   Vitals:    Vitals:    10/04/20 1829 10/04/20 0653 10/04/20 0921 10/04/20 1448   BP: 118/71 109/73  113/71   Pulse: 111 111  95   Resp: 15 16  16   Temp: 99.9 °F (37.7 °C) 99.7 °F (37.6 °C)  97.5 °F (36.4 °C)   TempSrc: Oral Oral  Oral   SpO2: 94% 95% 95% 94%   Weight:       Height:           Patient was given the following medications:  Medications   sodium chloride flush 0.9 % injection 10 mL (10 mLs Intravenous Not Given 10/4/20 0738)   sodium chloride flush 0.9 % injection 10 mL (has no administration in time range)   ondansetron (ZOFRAN-ODT) disintegrating tablet 4 mg (has no administration in time range)     Or   ondansetron (ZOFRAN) injection 4 mg (has no administration in time range)   enoxaparin (LOVENOX) injection 40 mg (0 mg Subcutaneous Held 10/4/20 0737)   potassium chloride 10 mEq/100 mL IVPB (Peripheral Line) (has no administration in time range)   magnesium sulfate 2 g in 50 mL IVPB premix (has no administration in time range)   metronidazole (FLAGYL) 500 mg in NaCl 100 mL IVPB premix (0 mg Intravenous Stopped 10/4/20 1426)   lactated ringers infusion ( Intravenous New Bag 10/4/20 1854)   cefTRIAXone (ROCEPHIN) 1 g IVPB in 50 mL D5W minibag (0 g Intravenous Stopped 10/4/20 0837)   fluconazole (DIFLUCAN) in 0.9 % sodium chloride IVPB 200 mg (200 mg Intravenous New Bag 10/4/20 1001)   morphine (PF) injection 2 mg (2 mg Intravenous Given 10/4/20 1851)   0.9 % sodium chloride bolus (0 mLs Intravenous Stopped 10/4/20 0259)   morphine (PF) injection 4 mg (4 mg Intravenous Given 10/4/20 0200)   ondansetron (ZOFRAN) injection 4 mg (4 mg Intravenous Given 10/4/20 0200)   0.9 % sodium chloride bolus (0 mLs Intravenous Stopped 10/4/20 0500)   iopamidol (ISOVUE-370) 76 % injection 75 mL (75 mLs Intravenous Given 10/4/20 0347)   iohexol (OMNIPAQUE 240) injection 50 mL (50 mLs Oral Given 10/4/20 0348)   metronidazole (FLAGYL) 500 mg in NaCl 100 mL IVPB premix (0 mg Intravenous Stopped 10/4/20 0727)   morphine 2 MG/ML injection (4 mg  Given 10/4/20 0510)           Differential diagnosis: Abdominal Aortic Aneurysm, Ischemic Bowel, Bowel Obstruction, Appendicitis, Diverticulitis, Pyelonephritis, UTI, STD, Ureterolithiasis, Colitis, Gonad Torsion, other    Patient presents with lower abdominal pain and subjective fevers. See HPI for full presentation. Physical exam as above. Ill-appearing 54year-old lying in a bed acute distress secondary to pain. Lower abdominal TTP and guarding. No peritoneal signs. She is tachycardic and overall appears unwell. Laboratory work-up was ordered in addition to a CT with oral contrast.  Fluids, antiemetic, and analgesics were ordered. Diagnostic data not resulted at time of this note.   As this is the end of my shift the attending physician will continue care of this patient. Patt Richards was signed out in stable condition. Please see Sundar Dotson DO note for further details, including diagnosis and disposition. FINAL IMPRESSION      1. Diverticulitis of large intestine with perforation without abscess or bleeding          DISPOSITION/PLAN   DISPOSITION        PATIENT REFERREDTO:  No follow-up provider specified.     DISCHARGE MEDICATIONS:  Current Discharge Medication List          DISCONTINUED MEDICATIONS:  Current Discharge Medication List      STOP taking these medications       b complex vitamins capsule Comments:   Reason for Stopping:                      (Please note that portions of this note were completed with a voice recognition program.  Efforts were made to edit the dictations but occasionally words are mis-transcribed.)    NATALYA Meredith CNP (electronically signed)            NATALYA Meredith CNP  10/04/20 1803

## 2020-10-04 NOTE — PROGRESS NOTES
H/p dictation id [id number]. Date of service 10/04/20. Acute abdomen. Fecal peritonitis. Perforated sigmoid diverticulitis.

## 2020-10-04 NOTE — ED PROVIDER NOTES
Attending Note:    The patient was seen and examined by the mid-level provider. I also completed a face-to-face examination. HPI: Frantz Leonard is a 54 y.o. female who presents to the emergency department with a complaint of left lower quadrant abdominal pain that initially began on Wednesday evening was very mild, gradually worsening over the last few days. Pain was worse on Saturday afternoon and then significantly worsened after having a bowel movement at approximately 8 PM this evening. She denies any fever or chills. No nausea or vomiting. No current antibiotics. She is known to have a history of diverticulitis and states that her last episode was several years ago. She had a follow-up colonoscopy which was unremarkable. She denies any melena hematochezia. She does not take any anticoagulants. She denies any chest pain or shortness of breath. No trauma or injury. No dysuria hematuria frequency urgency. Physical Exam:     Constitutional: No apparent distress. Head: No visible evidence of trauma. Normocephalic. Eyes: Pupils equal and reactive. No photophobia. Conjunctiva normal.    HENT: Oral mucosa moist.  Airway patent. Neck:  Soft and supple. Nontender. Heart: Mildly tachycardic. Sinus tachycardia in the monitor. Weight 110. Lungs:  Clear to auscultation. No wheezes, rales, or ronchi. No conversational dyspnea or accessory muscle use. Abdomen:  Soft, non-distended. [place] tender in the left lower quadrant. Mild voluntary guarding. No rebound tenderness. Musculoskeletal: Extremities non-tender with full range of motion. Neurological: Alert and oriented x 3. Speech clear. No acute focal motor or sensory deficits. Skin: Skin is warm and dry. No rash. Psychiatric: Normal mood and affect.  Behavior is normal.     DIAGNOSTIC RESULTS     EKG: All EKG's are interpreted by the Emergency Department Physician who either signs or Co-signs this chart in the absence of a Laboratory  [address] [place], [place] [address]   Phone [phone]   MICROSCOPIC URINALYSIS - Abnormal; Notable for the following components:    RBC, UA 7 (*)     All other components within normal limits    Narrative:     Performed at:  [facility]  [address] [place], [place] [address]   Phone [phone]   LIPASE    Narrative:     Performed at:  [facility]  [address] [place], [place] [address]   Phone [phone]   PROCALCITONIN   PROTIME-INR   APTT   TYPE AND SCREEN       All other labs were within normal range or not returned as of this dictation. EMERGENCY DEPARTMENT COURSE and DIFFERENTIAL DIAGNOSIS/MDM:   Vitals:    Vitals:    10/04/20 0217 10/04/20 0554 10/04/20 0609 10/04/20 0624   BP: 111/71 125/72 129/74 118/71   Pulse: 113 114  111   Resp:    15   Temp:    99.9 °F (37.7 °C)   TempSrc:    Oral   SpO2: 95% 94% 94% 94%   Weight:       Height:           The patient presents with left lower quadrant abdominal pain as noted above. Was given morphine and Zofran for pain. She was sent for CT abdomen and pelvis with IV contrast.  Differential diagnosis includes colitis, diverticulitis, pyelonephritis. MDM    0430 hrs.: CT abdomen pelvis was reviewed and discussed with the radiologist.  There is evidence of perforated sigmoid diverticulitis with scattered foci of air throughout the abdomen. The patient is mildly tachycardic with a heart rate of 110. However, her pain is well controlled. She is nontoxic. She is afebrile. She does have localized tenderness in the left lower quadrant with some voluntary guarding but no rebound tenderness. She was given Cipro and Flagyl. She was made n.p.o. Presurgical laboratory studies were added. A call was placed to the general surgeon on-call.    4:56 AM: I spoke with Dr. Fatoumata Mooney, general surgeon on-call. He recommends admission to the hospitalist service.   They will try nonoperative management for now. Was placed to the hospitalist on-call. CRITICAL CARE TIME   Total Critical Care time was 0 minutes, excluding separately reportable procedures. There was a high probability of clinically significant/life threatening deterioration in the patient's condition which required my urgent intervention. CONSULTS:  IP CONSULT TO GENERAL SURGERY    PROCEDURES:  Unless otherwise noted below, none     Procedures        FINAL IMPRESSION      1. Diverticulitis of large intestine with perforation without abscess or bleeding          DISPOSITION/PLAN   DISPOSITION        PATIENT REFERRED TO:  No follow-up provider specified. DISCHARGE MEDICATIONS:  New Prescriptions    No medications on file     Controlled Substances Monitoring:     RX Monitoring 5/29/2020   Periodic Controlled Substance Monitoring No signs of potential drug abuse or diversion identified. (Please note that portions of this note were completed with a voice recognition program.  Efforts were made to edit the dictations but occasionally words are mis-transcribed. )    1859 Nahum Knight DO (electronically signed)  Attending Emergency Physician      Teddy Dobson DO  10/04/20 0717

## 2020-10-04 NOTE — CONSULTS
PATIENT NAME: Fanta Caldwell   YOB: 1965    ADMISSION DATE: 10/4/2020  1:29 AM      TODAY'S DATE: 10/4/2020    CHIEF COMPLAINT:  Abdominal pain      HISTORY OF PRESENT ILLNESS:  The patient is a 54 y.o. female  who presents with acute worsening of a three day history of LLQ pain. Noted pain about three days ago similar to previous episodes of diverticulitis. Last night pain intensified and she presented to ER for evaluation. Noted to have tachycardia and CT shows sigmoid diverticulitis along with scattered bubbles of free air and some pelvic fluid. Admitted for IV antibiotics, serial exams and labs. Had colonoscopy about five years ago and was told no polyps or masses.     Past Medical History:        Diagnosis Date    Acquired underactive thyroid 4/20/2017    Asthma     when have colds goes into asthma    Chronic allergic rhinitis 4/3/2018    Diverticulitis 2006    colon 2006    Family history of Alzheimer's disease 11/15/2016    Hypercholesteremia due to HDL > 100 10/17/2013    no meds    Primary osteoarthritis of first carpometacarpal joint of left hand 9/25/2015       Past Surgical History:        Procedure Laterality Date   Laura Marti      laser liposuction-abdomen and thighs    EYE SURGERY      corrective    FINGER TRIGGER RELEASE Left 5/19/16    Middle Finger    HAND SURGERY Left 5/8/14    Middle Finger Ulnar Digital Nerve contusion and neuropraxia       Medications Prior to Admission:   Medications Prior to Admission: gabapentin (NEURONTIN) 100 MG capsule, TAKE 1 CAPSULE BY MOUTH EVERY MORNING AND TAKE 4 CAPSULES BY MOUTH EVERY EVENING (Patient taking differently: TAKE 4 CAPSULES BY MOUTH EVERY EVENING)  buPROPion (WELLBUTRIN XL) 150 MG extended release tablet, TAKE ONE TABLET BY MOUTH EVERY MORNING  ipratropium (ATROVENT) 0.03 % nasal spray, INSTILL 2 SPRAYS IN EACH NOSTRIL EVERY 12 HOURS  fluocinonide (LIDEX) 0.05 % ointment, Apply sparingly to affected area(s) bid prn for flares, up to 2 weeks at a time. Do not apply on cleared skin. levocetirizine (XYZAL) 5 MG tablet, TAKE ONE TABLET BY MOUTH NIGHTLY  liothyronine (CYTOMEL) 5 MCG tablet, Take 2 tablets by mouth daily  loratadine (CLARITIN) 10 MG tablet, Take 10 mg by mouth daily as needed   Multiple Vitamin (MULTIVITAMINS PO), Take 1 tablet by mouth daily   Ascorbic Acid (VITAMIN C) 500 MG tablet, Take 500 mg by mouth daily. magnesium 30 MG tablet, Take 400 mg by mouth daily   zinc gluconate 50 MG tablet, Take 50 mg by mouth daily. [DISCONTINUED] b complex vitamins capsule, Take 1 capsule by mouth daily  scopolamine (TRANSDERM-SCOP, 1.5 MG,) transdermal patch, Place 1 patch onto the skin every 72 hours Apply to behind ear starting 4 hours before travel. Omega-3 Fatty Acids (FISH OIL) 1000 MG CAPS, Take 1,000 mg by mouth daily    Allergies: Other; Penicillins; and Nickel    Social History:   TOBACCO:   reports that she has never smoked. She has never used smokeless tobacco.  ETOH:   reports current alcohol use of about 5.0 standard drinks of alcohol per week. DRUGS:   reports no history of drug use. Family History:       Problem Relation Age of Onset    Cancer Mother         uterine sarcoma, skin non-melanoma    Cancer Father         skin non-melanoma       REVIEW OF SYSTEMS:    CONSTITUTIONAL:  negative  HEENT:  negative  CARDIOVASCULAR:  negative  GASTROINTESTINAL:  positive for abdominal pain  GENITOURINARY:  negative  HEMATOLOGIC/LYMPHATIC:  negative  ENDOCRINE:  negative  All other systems negative    PHYSICAL EXAM:    VITALS:  /73   Pulse 111   Temp 99.7 °F (37.6 °C) (Oral)   Resp 16   Ht 5' 4\" (1.626 m)   Wt 179 lb (81.2 kg)   SpO2 95%   BMI 30.73 kg/m²   INTAKE/OUTPUT:   No intake/output data recorded. No intake/output data recorded.   CONSTITUTIONAL:  awake, alert, no apparent distress and normal weight  ENT:  normocepalic, without obvious abnormality  NECK:  supple, symmetrical, trachea midline   LUNGS:  clear to auscultation, no crackles or wheezing  CARDIOVASCULAR:  regular rate and rhythm and no murmur noted  ABDOMEN:  Soft, non distended, tender all four quadrants with voluntary guarding, no masses or hernias  MUSCULOSKELETAL:  0+ pitting edema lower extremities  NEUROLOGIC:  Mental Status Exam:  Level of Alertness:   awake  Orientation:   person, place, time      ASSESSMENT AND PLAN:    Sigmoid diverticulitis with perforation   Has peritonitis on exam but should respond to IV antibiotics   CT with minimal fluid indicates minimal contamination     Will monitor closely for worsening infection which would mandate exploration   NPO for now   Serial exam and labs   Repeat CT if not improved over next 2-3 days    Electronically signed by Nelia López MD on 10/4/2020 at 11:30 AM    Nelia López

## 2020-10-04 NOTE — H&P
[facility]           [address] [place] [address]                              HISTORY AND PHYSICAL    PATIENT NAME: JEAN CARLOS OLVERA                            :        1965  MED REC NO:   [id number]                          ROOM:       017  ACCOUNT NO:   [de-identified]                           ADMIT DATE: 10/04/2020  PROVIDER:     Car Sidhu MD    I obtained the history and performed the physical exam on the patient in  the emergency room on 10/04/2020. CHIEF COMPLAINT:  Abdominal pain. 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 The patient finally decided to come to the hospital  because she felt that something was definitely wrong and she initially  thought this was similar to her diverticulitis episode in the past but  at this time, it grew more severe. PAST MEDICAL/PAST SURGICAL HISTORY:  Dyslipidemia. PAST SURGICAL HISTORY:  No major surgical procedures. ALLERGIC HISTORY:  PENICILLIN. FAMILY HISTORY:  Reviewed by me and is currently noncontributory. SOCIAL HISTORY:  Nonsmoker. No illicit substance use. MEDICATIONS:  The patient's home medication list has been reviewed by  me. They have been listed in the EMR. REVIEW OF SYSTEMS:  Significant for the abdominal pain and per the  history of present illness. All other systems have been reviewed and  are negative except for the history of present illness.     PHYSICAL EXAMINATION:  VITAL SIGNS:

## 2020-10-04 NOTE — TELEPHONE ENCOUNTER
Works at [employer], having a diverticulitis flare up? Pain in lower abdomen on Wed night, pain seemed to be improving but then came back really bad, chills, excruciating very suddenly. Feels constipated, hurts when seit down due to pressure, unable to have bm, gone a little over the last few days but not much. Feels like abd is swollen, 9/10 pain. Dispo: go to ED Now, r/o acute abdomen, perforation, diverticulitis, obstruction    Reason for Disposition   [1] SEVERE pain (e.g., excruciating) AND [2] present > 1 hour    Answer Assessment - Initial Assessment Questions  1. LOCATION: \"Where does it hurt? \"       LLQ initially, now entire abdomen    2. RADIATION: \"Does the pain shoot anywhere else? \" (e.g., chest, back)      No    3. ONSET: \"When did the pain begin? \" (e.g., minutes, hours or days ago)       3 days ago    4. SUDDEN: \"Gradual or sudden onset? \"      was gradual but suddenly got worse    5. PATTERN \"Does the pain come and go, or is it constant? \"     - If constant: \"Is it getting better, staying the same, or worsening? \"       (Note: Constant means the pain never goes away completely; most serious pain is constant and it progresses)      - If intermittent: \"How long does it last?\" \"Do you have pain now? \"      (Note: Intermittent means the pain goes away completely between bouts)      constant    6. SEVERITY: \"How bad is the pain? \"  (e.g., Scale 1-10; mild, moderate, or severe)    - MILD (1-3): doesn't interfere with normal activities, abdomen soft and not tender to touch     - MODERATE (4-7): interferes with normal activities or awakens from sleep, tender to touch     - SEVERE (8-10): excruciating pain, doubled over, unable to do any normal activities       9/10    7. RECURRENT SYMPTOM: \"Have you ever had this type of abdominal pain before? \" If so, ask: \"When was the last time? \" and \"What happened that time? \"       Diverticulitis in the past    8. CAUSE: \"What do you think is causing the abdominal pain? \" Diverticulitis? 9. RELIEVING/AGGRAVATING FACTORS: \"What makes it better or worse? \" (e.g., movement, antacids, bowel movement)      Movement and deep breath makes it worse    10. OTHER SYMPTOMS: \"Has there been any vomiting, diarrhea, constipation, or urine problems? \"        Constipated    11. PREGNANCY: \"Is there any chance you are pregnant? \" \"When was your last menstrual period? \"        n/a    Protocols used: ABDOMINAL PAIN - [facility]

## 2020-10-04 NOTE — PROGRESS NOTES
4 Eyes Skin Assessment     NAME:  Fanta Caldwell  YOB: 1965  MEDICAL RECORD NUMBER:  [id number]    The patient is being assess for  Admission    I agree that 2 RN's have performed a thorough Head to Toe Skin Assessment on the patient. ALL assessment sites listed below have been assessed. Areas assessed by both nurses:    Head, Face, Ears, Shoulders, Back, Chest, Arms, Elbows, Hands, Sacrum. Buttock, Coccyx, Ischium and Legs. Feet and Heels        Does the Patient have a Wound?  No noted wound(s)       Mitch Prevention initiated:  No   Wound Care Orders initiated:  No    Pressure Injury (Stage 3,4, Unstageable, DTI, NWPT, and Complex wounds) if present place consult order under [de-identified] No    New and Established Ostomies if present place consult order under : No      Nurse 1 eSignature: Electronically signed by Godfrey Chester RN on 10/4/2020 at 8:19 AM      **SHARE this note so that the co-signing nurse is able to place an eSignature**    Nurse 2 eSignature: Electronically signed by Danielle Castorena RN on 10/4/20 at 2:47 PM EDT

## 2020-10-05 LAB
ALBUMIN SERPL-MCNC: 3.3 G/DL (ref 3.4–5)
ANION GAP SERPL CALCULATED.3IONS-SCNC: 12 MMOL/L (ref 3–16)
BASOPHILS ABSOLUTE: 0.1 K/UL (ref 0–0.2)
BASOPHILS RELATIVE PERCENT: 0.6 %
BUN BLDV-MCNC: 9 MG/DL (ref 7–20)
CALCIUM SERPL-MCNC: 8.9 MG/DL (ref 8.3–10.6)
CHLORIDE BLD-SCNC: 101 MMOL/L (ref 99–110)
CO2: 24 MMOL/L (ref 21–32)
CREAT SERPL-MCNC: 0.6 MG/DL (ref 0.6–1.1)
EOSINOPHILS ABSOLUTE: 0.1 K/UL (ref 0–0.6)
EOSINOPHILS RELATIVE PERCENT: 1.1 %
GFR AFRICAN AMERICAN: >60
GFR NON-AFRICAN AMERICAN: >60
GLUCOSE BLD-MCNC: 108 MG/DL (ref 70–99)
HCT VFR BLD CALC: 34.1 % (ref 36–48)
HEMOGLOBIN: 11.4 G/DL (ref 12–16)
LACTIC ACID: 1.3 MMOL/L (ref 0.4–2)
LYMPHOCYTES ABSOLUTE: 1 K/UL (ref 1–5.1)
LYMPHOCYTES RELATIVE PERCENT: 8.4 %
MAGNESIUM: 2 MG/DL (ref 1.8–2.4)
MCH RBC QN AUTO: 29.1 PG (ref 26–34)
MCHC RBC AUTO-ENTMCNC: 33.5 G/DL (ref 31–36)
MCV RBC AUTO: 86.9 FL (ref 80–100)
MONOCYTES ABSOLUTE: 0.9 K/UL (ref 0–1.3)
MONOCYTES RELATIVE PERCENT: 7 %
NEUTROPHILS ABSOLUTE: 10.2 K/UL (ref 1.7–7.7)
NEUTROPHILS RELATIVE PERCENT: 82.9 %
PDW BLD-RTO: 14.3 % (ref 12.4–15.4)
PHOSPHORUS: 3.1 MG/DL (ref 2.5–4.9)
PLATELET # BLD: 325 K/UL (ref 135–450)
PMV BLD AUTO: 7.1 FL (ref 5–10.5)
POTASSIUM SERPL-SCNC: 4.3 MMOL/L (ref 3.5–5.1)
RBC # BLD: 3.92 M/UL (ref 4–5.2)
SODIUM BLD-SCNC: 137 MMOL/L (ref 136–145)
WBC # BLD: 12.3 K/UL (ref 4–11)

## 2020-10-05 PROCEDURE — 36415 COLL VENOUS BLD VENIPUNCTURE: CPT

## 2020-10-05 PROCEDURE — APPSS15 APP SPLIT SHARED TIME 0-15 MINUTES: Performed by: PHYSICIAN ASSISTANT

## 2020-10-05 PROCEDURE — 6360000002 HC RX W HCPCS: Performed by: INTERNAL MEDICINE

## 2020-10-05 PROCEDURE — 94761 N-INVAS EAR/PLS OXIMETRY MLT: CPT

## 2020-10-05 PROCEDURE — 83605 ASSAY OF LACTIC ACID: CPT

## 2020-10-05 PROCEDURE — 2500000003 HC RX 250 WO HCPCS: Performed by: INTERNAL MEDICINE

## 2020-10-05 PROCEDURE — APPNB30 APP NON BILLABLE TIME 0-30 MINS: Performed by: PHYSICIAN ASSISTANT

## 2020-10-05 PROCEDURE — 83735 ASSAY OF MAGNESIUM: CPT

## 2020-10-05 PROCEDURE — 85025 COMPLETE CBC W/AUTO DIFF WBC: CPT

## 2020-10-05 PROCEDURE — 80069 RENAL FUNCTION PANEL: CPT

## 2020-10-05 PROCEDURE — 99232 SBSQ HOSP IP/OBS MODERATE 35: CPT | Performed by: SURGERY

## 2020-10-05 PROCEDURE — 1200000000 HC SEMI PRIVATE

## 2020-10-05 PROCEDURE — 2580000003 HC RX 258: Performed by: INTERNAL MEDICINE

## 2020-10-05 RX ADMIN — MORPHINE SULFATE 2 MG: 2 INJECTION, SOLUTION INTRAMUSCULAR; INTRAVENOUS at 06:23

## 2020-10-05 RX ADMIN — SODIUM CHLORIDE, POTASSIUM CHLORIDE, SODIUM LACTATE AND CALCIUM CHLORIDE: 600; 310; 30; 20 INJECTION, SOLUTION INTRAVENOUS at 23:35

## 2020-10-05 RX ADMIN — Medication 10 ML: at 22:20

## 2020-10-05 RX ADMIN — CEFTRIAXONE 1 G: 1 INJECTION, POWDER, FOR SOLUTION INTRAMUSCULAR; INTRAVENOUS at 06:13

## 2020-10-05 RX ADMIN — SODIUM CHLORIDE, POTASSIUM CHLORIDE, SODIUM LACTATE AND CALCIUM CHLORIDE: 600; 310; 30; 20 INJECTION, SOLUTION INTRAVENOUS at 11:16

## 2020-10-05 RX ADMIN — MORPHINE SULFATE 2 MG: 2 INJECTION, SOLUTION INTRAMUSCULAR; INTRAVENOUS at 11:12

## 2020-10-05 RX ADMIN — FLUCONAZOLE 200 MG: 200 INJECTION, SOLUTION INTRAVENOUS at 10:05

## 2020-10-05 RX ADMIN — METRONIDAZOLE 500 MG: 500 INJECTION, SOLUTION INTRAVENOUS at 06:13

## 2020-10-05 RX ADMIN — MORPHINE SULFATE 2 MG: 2 INJECTION, SOLUTION INTRAMUSCULAR; INTRAVENOUS at 15:40

## 2020-10-05 RX ADMIN — ONDANSETRON 4 MG: 2 INJECTION INTRAMUSCULAR; INTRAVENOUS at 22:19

## 2020-10-05 RX ADMIN — METRONIDAZOLE 500 MG: 500 INJECTION, SOLUTION INTRAVENOUS at 22:19

## 2020-10-05 RX ADMIN — METRONIDAZOLE 500 MG: 500 INJECTION, SOLUTION INTRAVENOUS at 14:22

## 2020-10-05 RX ADMIN — ONDANSETRON 4 MG: 2 INJECTION INTRAMUSCULAR; INTRAVENOUS at 14:26

## 2020-10-05 RX ADMIN — SODIUM CHLORIDE, POTASSIUM CHLORIDE, SODIUM LACTATE AND CALCIUM CHLORIDE: 600; 310; 30; 20 INJECTION, SOLUTION INTRAVENOUS at 01:59

## 2020-10-05 ASSESSMENT — PAIN DESCRIPTION - DESCRIPTORS
DESCRIPTORS: ACHING;SHARP
DESCRIPTORS: SPASM;ACHING;DISCOMFORT
DESCRIPTORS: SPASM;ACHING

## 2020-10-05 ASSESSMENT — PAIN DESCRIPTION - PROGRESSION
CLINICAL_PROGRESSION: NOT CHANGED

## 2020-10-05 ASSESSMENT — PAIN SCALES - GENERAL
PAINLEVEL_OUTOF10: 7
PAINLEVEL_OUTOF10: 0
PAINLEVEL_OUTOF10: 7
PAINLEVEL_OUTOF10: 0
PAINLEVEL_OUTOF10: 2
PAINLEVEL_OUTOF10: 6

## 2020-10-05 ASSESSMENT — PAIN DESCRIPTION - PAIN TYPE
TYPE: ACUTE PAIN

## 2020-10-05 ASSESSMENT — PAIN DESCRIPTION - ONSET
ONSET: ON-GOING

## 2020-10-05 ASSESSMENT — PAIN - FUNCTIONAL ASSESSMENT
PAIN_FUNCTIONAL_ASSESSMENT: ACTIVITIES ARE NOT PREVENTED

## 2020-10-05 ASSESSMENT — PAIN DESCRIPTION - LOCATION
LOCATION: ABDOMEN

## 2020-10-05 ASSESSMENT — PAIN DESCRIPTION - ORIENTATION
ORIENTATION: RIGHT;LEFT;LOWER

## 2020-10-05 ASSESSMENT — PAIN DESCRIPTION - FREQUENCY
FREQUENCY: CONTINUOUS

## 2020-10-05 NOTE — PROGRESS NOTES
Hospitalist Progress Note      PCP: Doris Morales MD    Date of Admission: 10/4/2020    Chief Complaint: abd pain. Subjective:     Feels some improvement in pain, but still with a lot of discomfort in the RUQ, suprapubic, LLQ regions. +BM       Medications:  Reviewed    Infusion Medications    lactated ringers 125 mL/hr at 10/05/20 1116     Scheduled Medications    sodium chloride flush  10 mL Intravenous 2 times per day    enoxaparin  40 mg Subcutaneous Daily    metroNIDAZOLE  500 mg Intravenous Q8H    cefTRIAXone (ROCEPHIN) IV  1 g Intravenous Q24H    fluconazole  200 mg Intravenous Q24H     PRN Meds: sodium chloride flush, ondansetron **OR** ondansetron, potassium chloride, magnesium sulfate, morphine      Intake/Output Summary (Last 24 hours) at 10/5/2020 1416  Last data filed at 10/4/2020 1853  Gross per 24 hour   Intake 1684 ml   Output --   Net 1684 ml       Physical Exam Performed:    /67   Pulse 69   Temp 98.4 °F (36.9 °C)   Resp 18   Ht 5' 4\" (1.626 m)   Wt 185 lb 6.5 oz (84.1 kg)   SpO2 95%   BMI 31.83 kg/m²     General appearance: No apparent distress, appears stated age and cooperative. HEENT: Pupils equal, round, and reactive to light. Conjunctivae/corneas clear. Neck: Supple, with full range of motion. No jugular venous distention. Trachea midline. Respiratory:  Normal respiratory effort. Clear to auscultation, bilaterally without Rales/Wheezes/Rhonchi. Cardiovascular: Regular rate and rhythm with normal S1/S2 without murmurs, rubs or gallops. Abdomen: tender suprapubic, LLQ and RUQ regions. No rebound. Musculoskeletal: No clubbing, cyanosis or edema bilaterally. Full range of motion without deformity. Skin: Skin color, texture, turgor normal.  No rashes or lesions. Neurologic:  Neurovascularly intact without any focal sensory/motor deficits.  Cranial nerves: II-XII intact, grossly non-focal.  Psychiatric: Alert and oriented, thought content appropriate, normal insight  Capillary Refill: Brisk,< 3 seconds   Peripheral Pulses: +2 palpable, equal bilaterally       Labs:   Recent Labs     10/04/20  0211 10/05/20  0902   WBC 8.1 12.3*   HGB 13.4 11.4*   HCT 40.2 34.1*    325     Recent Labs     10/04/20  0211 10/05/20  0902   * 137   K 4.3 4.3   CL 97* 101   CO2 23 24   BUN 17 9   CREATININE 0.7 0.6   CALCIUM 9.1 8.9   PHOS  --  3.1     Recent Labs     10/04/20  0211   AST 26   ALT 10   BILITOT 0.4   ALKPHOS 83     Recent Labs     10/04/20  0636   INR 1.10     No results for input(s): CKTOTAL, TROPONINI in the last 72 hours. Urinalysis:      Lab Results   Component Value Date    NITRU Negative 10/04/2020    WBCUA 2 10/04/2020    RBCUA 7 10/04/2020    BLOODU Negative 10/04/2020    SPECGRAV 1.027 10/04/2020    GLUCOSEU Negative 10/04/2020       Radiology:  XR CHEST PORTABLE   Final Result   No acute cardiopulmonary findings. CT ABDOMEN PELVIS W IV CONTRAST Additional Contrast? Oral   Preliminary Result   1. Acute perforated sigmoid diverticulitis with a small amount of   pneumoperitoneum. There is a small amount of free fluid in the pelvis   without drainable fluid collection. Critical results were called by Dr. Dianna Amos. Judy Soliman MD to [facility] on   10/4/2020 at 04:26. Assessment/Plan:    Active Hospital Problems    Diagnosis    Diverticulitis [K57.92]       Acute Complicated diverticulitis with peritonitis secondary to perforation. On IV rocephin and flagyl. Diflucan was added. Surgery involved. Cont IVF support and pain control.      Likely looking at need for surgical intervention - timing per surgery team.       DVT Prophylaxis: lovenox  Diet: DIET CLEAR LIQUID;  Diet NPO, After Midnight Exceptions are: Ice Chips  Code Status: Full Code      Dispo - cc    Gianna Cassidy MD

## 2020-10-05 NOTE — PROGRESS NOTES
General and Vascular Surgery                                                           Daily Progress Note                                                             Ezekiel Courtney PA-C     Pt Name: Jose G Arellano Record Number: [id number]  Date of Birth 1965   Today's Date: 10/5/2020    Chief Complaint: abdominal pain    ASSESSMENT/PLAN  1. Sigmoid diverticulitis with perforation  2. Feeling better today  3. Leukocytosis 8.1-->12.3. Will monitor  4. Will continue to monitor closely for any needs that may require surgical exploration  5. Clear liquid diet. NPO after midnight  6. Repeat labs in AM  7. Monitor and control pain  8. OOB as tolerated                EDUCATION  Patient educated about their illness/diagnosis, stated above, and all questions answered. We discussed the importance of nutrition, medications they are taking, and healthy lifestyle. SUBJECTIVE  Fanta has improved from yesterday. Pain is well controlled. She has no nausea and no vomiting. OBJECTIVE  VITALS:  height is 5' 4\" (1.626 m) and weight is 185 lb 6.5 oz (84.1 kg). Her oral temperature is 99.2 °F (37.3 °C). Her blood pressure is 115/71 and her pulse is 92. Her respiration is 18 and oxygen saturation is 94%. VITALS:  /71   Pulse 92   Temp 99.2 °F (37.3 °C) (Oral)   Resp 18   Ht 5' 4\" (1.626 m)   Wt 185 lb 6.5 oz (84.1 kg)   SpO2 94%   BMI 31.83 kg/m²   GENERAL: alert, cooperative, no distress  ABDOMEN: non-distended and tenderness improved,  without rebound and guarding  I/O last 3 completed shifts: In: 6738 [P.O.:400; I.V.:1284]  Out: -   No intake/output data recorded.     LABS  Recent Labs     10/04/20  0211 10/04/20  0636 10/05/20  0902   WBC 8.1  --  12.3*   HGB 13.4  --  11.4*   HCT 40.2  --  34.1*     --  325   *  --  137   K 4.3  --  4.3   CL 97*  --  101   CO2 23  --  24   BUN 17  --  9   CREATININE 0.7  --  0.6   MG  --   --  2.00   PHOS --   --  3.1   CALCIUM 9.1  --  8.9   INR  --  1.10  --    AST 26  --   --    ALT 10  --   --    BILITOT 0.4  --   --    NITRU Negative  --   --    COLORU YELLOW  --   --      CBC:   Lab Results   Component Value Date    WBC 12.3 10/05/2020    RBC 3.92 10/05/2020    HGB 11.4 10/05/2020    HCT 34.1 10/05/2020    MCV 86.9 10/05/2020    MCH 29.1 10/05/2020    MCHC 33.5 10/05/2020    RDW 14.3 10/05/2020     10/05/2020    MPV 7.1 10/05/2020     CMP:    Lab Results   Component Value Date     10/05/2020    K 4.3 10/05/2020    K 4.3 10/04/2020     10/05/2020    CO2 24 10/05/2020    BUN 9 10/05/2020    CREATININE 0.6 10/05/2020    GFRAA >60 10/05/2020    GFRAA >60 03/22/2012    AGRATIO 1.1 10/04/2020    LABGLOM >60 10/05/2020    GLUCOSE 108 10/05/2020    PROT 7.2 10/04/2020    PROT 6.8 03/22/2012    LABALBU 3.3 10/05/2020    CALCIUM 8.9 10/05/2020    BILITOT 0.4 10/04/2020    ALKPHOS 83 10/04/2020    AST 26 10/04/2020    ALT 10 10/04/2020         Jaclyn Louis PA-C  Electronically signed 10/5/2020 at 1:05 PM    Attending    As per note above by Seb Morelos  Patient was personally seen and examined by me today  Chart, labs and imaging reviewed    A/P  Sigmoid diverticulitis   Doing better today   Up to the chair, ambulating without assistance   Still with pain but improved   Will start clears   Monitor pain and wbc    If this episode resolves we will plan elective sigmoid resection in 4-6 weeks  If she worsens will need intervention this admission    Electronically signed by Mei Goodman MD on 10/5/2020 at 3:52 PM

## 2020-10-05 NOTE — PLAN OF CARE
Problem: Pain:  Goal: Pain level will decrease  Description: Pain level will decrease  10/5/2020 0918 by Phoenix Wells RN  Outcome: Ongoing  10/5/2020 0003 by Amada Castelan RN  Outcome: Ongoing  Goal: Control of acute pain  Description: Control of acute pain  10/5/2020 0918 by Phoenix Wells RN  Outcome: Ongoing  10/5/2020 0003 by Amada Castelan RN  Outcome: Ongoing  Goal: Control of chronic pain  Description: Control of chronic pain  10/5/2020 0918 by Phoenix Wells RN  Outcome: Ongoing  10/5/2020 0003 by Amada Castelan RN  Outcome: Ongoing     Problem: Infection:  Goal: Will remain free from infection  Description: Will remain free from infection  10/5/2020 0918 by Phoenix Wells RN  Outcome: Ongoing  10/5/2020 0003 by Amada Castelan RN  Outcome: Ongoing     Problem: Safety:  Goal: Free from accidental physical injury  Description: Free from accidental physical injury  10/5/2020 0918 by Phoenix Wells RN  Outcome: Ongoing  10/5/2020 0003 by Amada Castelan RN  Outcome: Ongoing  Goal: Free from intentional harm  Description: Free from intentional harm  10/5/2020 0918 by Phoenix Wells RN  Outcome: Ongoing  10/5/2020 0003 by Amada Castelan RN  Outcome: Ongoing     Problem: Daily Care:  Goal: Daily care needs are met  Description: Daily care needs are met  10/5/2020 0918 by Phoenix Wells RN  Outcome: Ongoing  10/5/2020 0003 by Amada Castelan RN  Outcome: Ongoing     Problem: Skin Integrity:  Goal: Skin integrity will stabilize  Description: Skin integrity will stabilize  10/5/2020 0918 by Phoenix Wells RN  Outcome: Ongoing  10/5/2020 0003 by Amada Castelan RN  Outcome: Ongoing     Problem: Discharge Planning:  Goal: Patients continuum of care needs are met  Description: Patients continuum of care needs are met  10/5/2020 0918 by Phoenix Wells RN  Outcome: Ongoing  10/5/2020 0003 by Amada Castelan RN  Outcome: Ongoing

## 2020-10-06 LAB
ANION GAP SERPL CALCULATED.3IONS-SCNC: 11 MMOL/L (ref 3–16)
BUN BLDV-MCNC: 8 MG/DL (ref 7–20)
CALCIUM SERPL-MCNC: 8.9 MG/DL (ref 8.3–10.6)
CHLORIDE BLD-SCNC: 101 MMOL/L (ref 99–110)
CO2: 27 MMOL/L (ref 21–32)
CREAT SERPL-MCNC: 0.6 MG/DL (ref 0.6–1.1)
GFR AFRICAN AMERICAN: >60
GFR NON-AFRICAN AMERICAN: >60
GLUCOSE BLD-MCNC: 97 MG/DL (ref 70–99)
HCT VFR BLD CALC: 32.6 % (ref 36–48)
HEMOGLOBIN: 10.9 G/DL (ref 12–16)
MCH RBC QN AUTO: 29.2 PG (ref 26–34)
MCHC RBC AUTO-ENTMCNC: 33.5 G/DL (ref 31–36)
MCV RBC AUTO: 87.1 FL (ref 80–100)
PDW BLD-RTO: 13.9 % (ref 12.4–15.4)
PLATELET # BLD: 328 K/UL (ref 135–450)
PMV BLD AUTO: 7.1 FL (ref 5–10.5)
POTASSIUM SERPL-SCNC: 4.1 MMOL/L (ref 3.5–5.1)
RBC # BLD: 3.74 M/UL (ref 4–5.2)
SODIUM BLD-SCNC: 139 MMOL/L (ref 136–145)
WBC # BLD: 10.5 K/UL (ref 4–11)

## 2020-10-06 PROCEDURE — 36415 COLL VENOUS BLD VENIPUNCTURE: CPT

## 2020-10-06 PROCEDURE — 2500000003 HC RX 250 WO HCPCS: Performed by: INTERNAL MEDICINE

## 2020-10-06 PROCEDURE — APPSS15 APP SPLIT SHARED TIME 0-15 MINUTES: Performed by: PHYSICIAN ASSISTANT

## 2020-10-06 PROCEDURE — 2580000003 HC RX 258: Performed by: INTERNAL MEDICINE

## 2020-10-06 PROCEDURE — 6360000002 HC RX W HCPCS: Performed by: INTERNAL MEDICINE

## 2020-10-06 PROCEDURE — APPNB30 APP NON BILLABLE TIME 0-30 MINS: Performed by: PHYSICIAN ASSISTANT

## 2020-10-06 PROCEDURE — 80048 BASIC METABOLIC PNL TOTAL CA: CPT

## 2020-10-06 PROCEDURE — 85027 COMPLETE CBC AUTOMATED: CPT

## 2020-10-06 PROCEDURE — 1200000000 HC SEMI PRIVATE

## 2020-10-06 PROCEDURE — 94760 N-INVAS EAR/PLS OXIMETRY 1: CPT

## 2020-10-06 PROCEDURE — 6370000000 HC RX 637 (ALT 250 FOR IP): Performed by: INTERNAL MEDICINE

## 2020-10-06 RX ORDER — GUAIFENESIN 600 MG/1
600 TABLET, EXTENDED RELEASE ORAL 2 TIMES DAILY
Status: DISCONTINUED | OUTPATIENT
Start: 2020-10-06 | End: 2020-10-09 | Stop reason: HOSPADM

## 2020-10-06 RX ADMIN — SODIUM CHLORIDE, POTASSIUM CHLORIDE, SODIUM LACTATE AND CALCIUM CHLORIDE: 600; 310; 30; 20 INJECTION, SOLUTION INTRAVENOUS at 09:46

## 2020-10-06 RX ADMIN — ONDANSETRON 4 MG: 2 INJECTION INTRAMUSCULAR; INTRAVENOUS at 12:50

## 2020-10-06 RX ADMIN — GUAIFENESIN 600 MG: 600 TABLET ORAL at 20:40

## 2020-10-06 RX ADMIN — GUAIFENESIN 600 MG: 600 TABLET ORAL at 10:58

## 2020-10-06 RX ADMIN — ONDANSETRON 4 MG: 2 INJECTION INTRAMUSCULAR; INTRAVENOUS at 18:57

## 2020-10-06 RX ADMIN — CEFTRIAXONE 1 G: 1 INJECTION, POWDER, FOR SOLUTION INTRAMUSCULAR; INTRAVENOUS at 08:53

## 2020-10-06 RX ADMIN — FLUCONAZOLE 200 MG: 200 INJECTION, SOLUTION INTRAVENOUS at 09:46

## 2020-10-06 RX ADMIN — METRONIDAZOLE 500 MG: 500 INJECTION, SOLUTION INTRAVENOUS at 13:05

## 2020-10-06 RX ADMIN — ONDANSETRON 4 MG: 2 INJECTION INTRAMUSCULAR; INTRAVENOUS at 06:49

## 2020-10-06 RX ADMIN — METRONIDAZOLE 500 MG: 500 INJECTION, SOLUTION INTRAVENOUS at 20:40

## 2020-10-06 RX ADMIN — SODIUM CHLORIDE, POTASSIUM CHLORIDE, SODIUM LACTATE AND CALCIUM CHLORIDE: 600; 310; 30; 20 INJECTION, SOLUTION INTRAVENOUS at 22:41

## 2020-10-06 RX ADMIN — Medication 10 ML: at 08:53

## 2020-10-06 RX ADMIN — METRONIDAZOLE 500 MG: 500 INJECTION, SOLUTION INTRAVENOUS at 06:46

## 2020-10-06 RX ADMIN — ENOXAPARIN SODIUM 40 MG: 40 INJECTION SUBCUTANEOUS at 10:59

## 2020-10-06 ASSESSMENT — PAIN DESCRIPTION - PROGRESSION
CLINICAL_PROGRESSION: NOT CHANGED

## 2020-10-06 ASSESSMENT — PAIN DESCRIPTION - LOCATION
LOCATION: ABDOMEN
LOCATION: ABDOMEN

## 2020-10-06 ASSESSMENT — PAIN DESCRIPTION - DESCRIPTORS
DESCRIPTORS: SPASM;ACHING
DESCRIPTORS: SPASM;ACHING

## 2020-10-06 ASSESSMENT — PAIN - FUNCTIONAL ASSESSMENT
PAIN_FUNCTIONAL_ASSESSMENT: ACTIVITIES ARE NOT PREVENTED
PAIN_FUNCTIONAL_ASSESSMENT: ACTIVITIES ARE NOT PREVENTED

## 2020-10-06 ASSESSMENT — PAIN SCALES - GENERAL
PAINLEVEL_OUTOF10: 0

## 2020-10-06 ASSESSMENT — PAIN DESCRIPTION - FREQUENCY
FREQUENCY: CONTINUOUS
FREQUENCY: CONTINUOUS

## 2020-10-06 ASSESSMENT — PAIN DESCRIPTION - ONSET
ONSET: ON-GOING
ONSET: ON-GOING

## 2020-10-06 ASSESSMENT — PAIN DESCRIPTION - ORIENTATION
ORIENTATION: RIGHT;LEFT;LOWER
ORIENTATION: RIGHT;LEFT;LOWER

## 2020-10-06 ASSESSMENT — PAIN DESCRIPTION - PAIN TYPE
TYPE: ACUTE PAIN
TYPE: ACUTE PAIN

## 2020-10-06 NOTE — PROGRESS NOTES
Hospitalist Progress Note      PCP: David Romo MD    Date of Admission: 10/4/2020    Chief Complaint: abd pain. Subjective:     Feels some improvement in abd pain  +BM     Still very nauseated. Medications:  Reviewed    Infusion Medications    lactated ringers 125 mL/hr at 10/06/20 0946     Scheduled Medications    guaiFENesin  600 mg Oral BID    sodium chloride flush  10 mL Intravenous 2 times per day    enoxaparin  40 mg Subcutaneous Daily    metroNIDAZOLE  500 mg Intravenous Q8H    cefTRIAXone (ROCEPHIN) IV  1 g Intravenous Q24H    fluconazole  200 mg Intravenous Q24H     PRN Meds: sodium chloride flush, ondansetron **OR** ondansetron, potassium chloride, magnesium sulfate, morphine      Intake/Output Summary (Last 24 hours) at 10/6/2020 1250  Last data filed at 10/5/2020 1834  Gross per 24 hour   Intake 240 ml   Output --   Net 240 ml       Physical Exam Performed:    BP (!) 152/69   Pulse 66   Temp 98.2 °F (36.8 °C) (Oral)   Resp 18   Ht 5' 4\" (1.626 m)   Wt 183 lb 13.8 oz (83.4 kg)   SpO2 95%   BMI 31.56 kg/m²     General appearance: No apparent distress, appears stated age and cooperative. HEENT: Pupils equal, round, and reactive to light. Conjunctivae/corneas clear. Neck: Supple, with full range of motion. No jugular venous distention. Trachea midline. Respiratory:  Normal respiratory effort. Clear to auscultation, bilaterally without Rales/Wheezes/Rhonchi. Cardiovascular: Regular rate and rhythm with normal S1/S2 without murmurs, rubs or gallops. Abdomen: tender suprapubic, LLQ and RUQ regions. No rebound. Musculoskeletal: No clubbing, cyanosis or edema bilaterally. Full range of motion without deformity. Skin: Skin color, texture, turgor normal.  No rashes or lesions. Neurologic:  Neurovascularly intact without any focal sensory/motor deficits.  Cranial nerves: II-XII intact, grossly non-focal.  Psychiatric: Alert and oriented, thought content appropriate, normal insight  Capillary Refill: Brisk,< 3 seconds   Peripheral Pulses: +2 palpable, equal bilaterally       Labs:   Recent Labs     10/04/20  0211 10/05/20  0902 10/06/20  0958   WBC 8.1 12.3* 10.5   HGB 13.4 11.4* 10.9*   HCT 40.2 34.1* 32.6*    325 328     Recent Labs     10/04/20  0211 10/05/20  0902 10/06/20  0958   * 137 139   K 4.3 4.3 4.1   CL 97* 101 101   CO2 23 24 27   BUN 17 9 8   CREATININE 0.7 0.6 0.6   CALCIUM 9.1 8.9 8.9   PHOS  --  3.1  --      Recent Labs     10/04/20  0211   AST 26   ALT 10   BILITOT 0.4   ALKPHOS 83     Recent Labs     10/04/20  0636   INR 1.10     No results for input(s): Philly Benitez in the last 72 hours. Urinalysis:      Lab Results   Component Value Date    NITRU Negative 10/04/2020    WBCUA 2 10/04/2020    RBCUA 7 10/04/2020    BLOODU Negative 10/04/2020    SPECGRAV 1.027 10/04/2020    GLUCOSEU Negative 10/04/2020       Radiology:  XR CHEST PORTABLE   Final Result   No acute cardiopulmonary findings. CT ABDOMEN PELVIS W IV CONTRAST Additional Contrast? Oral   Final Result   1. Acute perforated sigmoid diverticulitis with a small amount of   pneumoperitoneum. There is a small amount of free fluid in the pelvis   without drainable fluid collection. Critical results were called by Dr. Sonja Lara. Deonte Gan MD to [facility] on   10/4/2020 at 04:26. Assessment/Plan:    Active Hospital Problems    Diagnosis    Diverticulitis [K57.92]       Acute Complicated diverticulitis with peritonitis secondary to perforation. On IV rocephin and flagyl. Diflucan was added. Surgery involved. Cont IVF support and pain control.      Likely looking at need for surgical intervention - timing per surgery team.       DVT Prophylaxis: lovenox  Diet: DIET FULL LIQUID;  Code Status: Full Code      Dispo - cc    Roseanna Sheets MD

## 2020-10-06 NOTE — PROGRESS NOTES
4.3 4.1   CL 97*  --   --  101 101   CO2 23  --   --  24 27   BUN 17  --   --  9 8   CREATININE 0.7  --   --  0.6 0.6   MG  --   --   --  2.00  --    PHOS  --   --   --  3.1  --    CALCIUM 9.1  --   --  8.9 8.9   INR  --  1.10  --   --   --    AST 26  --   --   --   --    ALT 10  --   --   --   --    BILITOT 0.4  --   --   --   --    NITRU Negative  --   --   --   --    COLORU YELLOW  --   --   --   --     < > = values in this interval not displayed.      CBC:   Lab Results   Component Value Date    WBC 10.5 10/06/2020    RBC 3.74 10/06/2020    HGB 10.9 10/06/2020    HCT 32.6 10/06/2020    MCV 87.1 10/06/2020    MCH 29.2 10/06/2020    MCHC 33.5 10/06/2020    RDW 13.9 10/06/2020     10/06/2020    MPV 7.1 10/06/2020     CMP:    Lab Results   Component Value Date     10/06/2020    K 4.1 10/06/2020    K 4.3 10/04/2020     10/06/2020    CO2 27 10/06/2020    BUN 8 10/06/2020    CREATININE 0.6 10/06/2020    GFRAA >60 10/06/2020    GFRAA >60 03/22/2012    AGRATIO 1.1 10/04/2020    LABGLOM >60 10/06/2020    GLUCOSE 97 10/06/2020    PROT 7.2 10/04/2020    PROT 6.8 03/22/2012    LABALBU 3.3 10/05/2020    CALCIUM 8.9 10/06/2020    BILITOT 0.4 10/04/2020    ALKPHOS 83 10/04/2020    AST 26 10/04/2020    ALT 10 10/04/2020         Araceli Reyes PA-C  Electronically signed 10/6/2020 at 11:16 AM    As above  Doing better in terms of pain, still limited PO intake    WBC better, down to 10    Continue antibiotics  If not improved in AM will repeat CT    Electronically signed by Svitlana Shepherd MD on 10/6/2020 at 12:28 PM

## 2020-10-06 NOTE — PROGRESS NOTES
Pt c/o increased nausea with full liquid diet, instructed pt to go back to just ice chips for the time being. PRN zofran given.  Electronically signed by Chris Smith RN on 10/6/2020 at 7:06 PM

## 2020-10-06 NOTE — PROGRESS NOTES
Pharmacy Medication Reconciliation Note     List of medications patient is currently taking is complete. Source of information:   1. Conversation with Fanta at bedside. She is very knowledgeable about her meds     Allergies   Allergen Reactions    Other Itching     thermersol-preservative for contact solution-pt states got itchy eyes    Penicillins Hives    Nickel Rash       Notes regarding home medications:   1. Prescribed Neurontin 100 mg AM and 400 mg PM for hot flashes. Has not been taking the am dose due to how it makes her feel. Considering restarting it at some point to see if her body can get used to it. 2. Atrovent nasal = winter months only. Transderm Scop = standing RX for travel on cruises. Clinical Pharmacy Note  Medication Counseling    Reviewed new medications started during hospital admission: Rocephin, Flagyl, Diflucan,Morphine. Indications and side effects were emphasized during counseling. All medication-related questions addressed. Patient verbalized understanding of education. Should the patient express any additional questions or concerns regarding their medications, please do not hesitate to contact the pharmacy department. Patient/caregiver aware they may refuse medications during hospital stay. Fanta is choosing not to receive Lovenox as she's ambulating in the room        15 minutes spent educating patient regarding medications.     Felix Mcclain, [facility]

## 2020-10-07 LAB
ALBUMIN SERPL-MCNC: 3.5 G/DL (ref 3.4–5)
ALP BLD-CCNC: 78 U/L (ref 40–129)
ALT SERPL-CCNC: 7 U/L (ref 10–40)
ANION GAP SERPL CALCULATED.3IONS-SCNC: 15 MMOL/L (ref 3–16)
AST SERPL-CCNC: 18 U/L (ref 15–37)
BASOPHILS ABSOLUTE: 0.1 K/UL (ref 0–0.2)
BASOPHILS RELATIVE PERCENT: 1.4 %
BILIRUB SERPL-MCNC: <0.2 MG/DL (ref 0–1)
BILIRUBIN DIRECT: <0.2 MG/DL (ref 0–0.3)
BILIRUBIN, INDIRECT: ABNORMAL MG/DL (ref 0–1)
BUN BLDV-MCNC: 8 MG/DL (ref 7–20)
CALCIUM SERPL-MCNC: 9.1 MG/DL (ref 8.3–10.6)
CHLORIDE BLD-SCNC: 101 MMOL/L (ref 99–110)
CO2: 26 MMOL/L (ref 21–32)
CREAT SERPL-MCNC: 0.6 MG/DL (ref 0.6–1.1)
EOSINOPHILS ABSOLUTE: 0.1 K/UL (ref 0–0.6)
EOSINOPHILS RELATIVE PERCENT: 1.4 %
GFR AFRICAN AMERICAN: >60
GFR NON-AFRICAN AMERICAN: >60
GLUCOSE BLD-MCNC: 101 MG/DL (ref 70–99)
HCT VFR BLD CALC: 34.4 % (ref 36–48)
HEMOGLOBIN: 11.6 G/DL (ref 12–16)
LYMPHOCYTES ABSOLUTE: 1 K/UL (ref 1–5.1)
LYMPHOCYTES RELATIVE PERCENT: 13.6 %
MAGNESIUM: 1.9 MG/DL (ref 1.8–2.4)
MCH RBC QN AUTO: 29.2 PG (ref 26–34)
MCHC RBC AUTO-ENTMCNC: 33.8 G/DL (ref 31–36)
MCV RBC AUTO: 86.3 FL (ref 80–100)
MONOCYTES ABSOLUTE: 0.8 K/UL (ref 0–1.3)
MONOCYTES RELATIVE PERCENT: 10.4 %
NEUTROPHILS ABSOLUTE: 5.6 K/UL (ref 1.7–7.7)
NEUTROPHILS RELATIVE PERCENT: 73.2 %
PDW BLD-RTO: 13.7 % (ref 12.4–15.4)
PHOSPHORUS: 3.8 MG/DL (ref 2.5–4.9)
PLATELET # BLD: 394 K/UL (ref 135–450)
PMV BLD AUTO: 7.6 FL (ref 5–10.5)
POTASSIUM SERPL-SCNC: 3.5 MMOL/L (ref 3.5–5.1)
RBC # BLD: 3.98 M/UL (ref 4–5.2)
SODIUM BLD-SCNC: 142 MMOL/L (ref 136–145)
TOTAL PROTEIN: 7 G/DL (ref 6.4–8.2)
WBC # BLD: 7.7 K/UL (ref 4–11)

## 2020-10-07 PROCEDURE — 85025 COMPLETE CBC W/AUTO DIFF WBC: CPT

## 2020-10-07 PROCEDURE — 6360000002 HC RX W HCPCS: Performed by: INTERNAL MEDICINE

## 2020-10-07 PROCEDURE — 83735 ASSAY OF MAGNESIUM: CPT

## 2020-10-07 PROCEDURE — 6370000000 HC RX 637 (ALT 250 FOR IP): Performed by: INTERNAL MEDICINE

## 2020-10-07 PROCEDURE — 2500000003 HC RX 250 WO HCPCS: Performed by: INTERNAL MEDICINE

## 2020-10-07 PROCEDURE — APPSS15 APP SPLIT SHARED TIME 0-15 MINUTES: Performed by: PHYSICIAN ASSISTANT

## 2020-10-07 PROCEDURE — 80076 HEPATIC FUNCTION PANEL: CPT

## 2020-10-07 PROCEDURE — 1200000000 HC SEMI PRIVATE

## 2020-10-07 PROCEDURE — 94760 N-INVAS EAR/PLS OXIMETRY 1: CPT

## 2020-10-07 PROCEDURE — APPNB30 APP NON BILLABLE TIME 0-30 MINS: Performed by: PHYSICIAN ASSISTANT

## 2020-10-07 PROCEDURE — 99232 SBSQ HOSP IP/OBS MODERATE 35: CPT | Performed by: SURGERY

## 2020-10-07 PROCEDURE — 2580000003 HC RX 258: Performed by: INTERNAL MEDICINE

## 2020-10-07 PROCEDURE — 80069 RENAL FUNCTION PANEL: CPT

## 2020-10-07 RX ORDER — PROMETHAZINE HYDROCHLORIDE 25 MG/ML
12.5 INJECTION, SOLUTION INTRAMUSCULAR; INTRAVENOUS EVERY 4 HOURS PRN
Status: DISCONTINUED | OUTPATIENT
Start: 2020-10-07 | End: 2020-10-09 | Stop reason: HOSPADM

## 2020-10-07 RX ADMIN — METRONIDAZOLE 500 MG: 500 INJECTION, SOLUTION INTRAVENOUS at 14:14

## 2020-10-07 RX ADMIN — PROMETHAZINE HYDROCHLORIDE 12.5 MG: 25 INJECTION INTRAMUSCULAR; INTRAVENOUS at 14:14

## 2020-10-07 RX ADMIN — METRONIDAZOLE 500 MG: 500 INJECTION, SOLUTION INTRAVENOUS at 06:01

## 2020-10-07 RX ADMIN — METRONIDAZOLE 500 MG: 500 INJECTION, SOLUTION INTRAVENOUS at 21:02

## 2020-10-07 RX ADMIN — CEFTRIAXONE 1 G: 1 INJECTION, POWDER, FOR SOLUTION INTRAMUSCULAR; INTRAVENOUS at 07:45

## 2020-10-07 RX ADMIN — FLUCONAZOLE 200 MG: 200 INJECTION, SOLUTION INTRAVENOUS at 11:18

## 2020-10-07 RX ADMIN — ENOXAPARIN SODIUM 40 MG: 40 INJECTION SUBCUTANEOUS at 08:18

## 2020-10-07 RX ADMIN — SODIUM CHLORIDE, POTASSIUM CHLORIDE, SODIUM LACTATE AND CALCIUM CHLORIDE: 600; 310; 30; 20 INJECTION, SOLUTION INTRAVENOUS at 21:03

## 2020-10-07 RX ADMIN — SODIUM CHLORIDE, POTASSIUM CHLORIDE, SODIUM LACTATE AND CALCIUM CHLORIDE: 600; 310; 30; 20 INJECTION, SOLUTION INTRAVENOUS at 12:41

## 2020-10-07 RX ADMIN — GUAIFENESIN 600 MG: 600 TABLET ORAL at 08:18

## 2020-10-07 RX ADMIN — GUAIFENESIN 600 MG: 600 TABLET ORAL at 21:02

## 2020-10-07 RX ADMIN — Medication 10 ML: at 11:19

## 2020-10-07 ASSESSMENT — PAIN SCALES - GENERAL
PAINLEVEL_OUTOF10: 0
PAINLEVEL_OUTOF10: 0

## 2020-10-07 NOTE — PLAN OF CARE
Problem: Pain:  Goal: Pain level will decrease  Outcome: Ongoing     Problem: Infection:  Goal: Will remain free from infection  Outcome: Ongoing     Problem: Safety:  Goal: Free from accidental physical injury  Outcome: Ongoing     Problem: Daily Care:  Goal: Daily care needs are met  Outcome: Ongoing     Problem: Skin Integrity:  Goal: Skin integrity will stabilize  Outcome: Ongoing     Problem: Discharge Planning:  Goal: Patients continuum of care needs are met  Outcome: Ongoing

## 2020-10-07 NOTE — PROGRESS NOTES
Hospitalist Progress Note      PCP: David Romo MD    Date of Admission: 10/4/2020    Chief Complaint: abd pain. Subjective:     Very nauseated. PO tolerance NOT reassuring. Medications:  Reviewed    Infusion Medications    lactated ringers 125 mL/hr at 10/06/20 2241     Scheduled Medications    guaiFENesin  600 mg Oral BID    sodium chloride flush  10 mL Intravenous 2 times per day    enoxaparin  40 mg Subcutaneous Daily    metroNIDAZOLE  500 mg Intravenous Q8H    cefTRIAXone (ROCEPHIN) IV  1 g Intravenous Q24H    fluconazole  200 mg Intravenous Q24H     PRN Meds: promethazine, ondansetron (ZOFRAN) IVPB, sodium chloride flush, ondansetron **OR** [DISCONTINUED] ondansetron, potassium chloride, magnesium sulfate, morphine      Intake/Output Summary (Last 24 hours) at 10/7/2020 1230  Last data filed at 10/7/2020 0746  Gross per 24 hour   Intake 360 ml   Output --   Net 360 ml       Physical Exam Performed:    /78   Pulse 57   Temp 97.3 °F (36.3 °C) (Oral)   Resp 18   Ht 5' 4\" (1.626 m)   Wt 189 lb 9.5 oz (86 kg)   SpO2 95%   BMI 32.54 kg/m²     General appearance: No apparent distress, appears stated age and cooperative. HEENT: Pupils equal, round, and reactive to light. Conjunctivae/corneas clear. Neck: Supple, with full range of motion. No jugular venous distention. Trachea midline. Respiratory:  Normal respiratory effort. Clear to auscultation, bilaterally without Rales/Wheezes/Rhonchi. Cardiovascular: Regular rate and rhythm with normal S1/S2 without murmurs, rubs or gallops. Abdomen: tender suprapubic, LLQ and RUQ regions. No rebound. Musculoskeletal: No clubbing, cyanosis or edema bilaterally. Full range of motion without deformity. Skin: Skin color, texture, turgor normal.  No rashes or lesions. Neurologic:  Neurovascularly intact without any focal sensory/motor deficits.  Cranial nerves: II-XII intact, grossly non-focal.  Psychiatric: Alert and oriented, thought content appropriate, normal insight  Capillary Refill: Brisk,< 3 seconds   Peripheral Pulses: +2 palpable, equal bilaterally       Labs:   Recent Labs     10/05/20  0902 10/06/20  0958 10/07/20  1019   WBC 12.3* 10.5 7.7   HGB 11.4* 10.9* 11.6*   HCT 34.1* 32.6* 34.4*    328 394     Recent Labs     10/05/20  0902 10/06/20  0958 10/07/20  1019    139 142   K 4.3 4.1 3.5    101 101   CO2 24 27 26   BUN 9 8 8   CREATININE 0.6 0.6 0.6   CALCIUM 8.9 8.9 9.1   PHOS 3.1  --  3.8     Recent Labs     10/07/20  1019   AST 18   ALT 7*   BILIDIR <0.2   BILITOT <0.2   ALKPHOS 78     No results for input(s): INR in the last 72 hours. No results for input(s): [place] Dec in the last 72 hours. Urinalysis:      Lab Results   Component Value Date    NITRU Negative 10/04/2020    WBCUA 2 10/04/2020    RBCUA 7 10/04/2020    BLOODU Negative 10/04/2020    SPECGRAV 1.027 10/04/2020    GLUCOSEU Negative 10/04/2020       Radiology:  XR CHEST PORTABLE   Final Result   No acute cardiopulmonary findings. CT ABDOMEN PELVIS W IV CONTRAST Additional Contrast? Oral   Final Result   1. Acute perforated sigmoid diverticulitis with a small amount of   pneumoperitoneum. There is a small amount of free fluid in the pelvis   without drainable fluid collection. Critical results were called by Dr. Janee Smith. Clair Simpson MD to [facility] on   10/4/2020 at 04:26. Assessment/Plan:    Active Hospital Problems    Diagnosis    Diverticulitis [K57.92]       Acute Complicated diverticulitis with peritonitis secondary to perforation. On IV rocephin and flagyl. Diflucan was added. Surgery involved. Cont IVF support and pain control. Still very nauseated   - resistant to zofran. - add phenergan.     - add zofran extended infusion.   - I don't think she should be eating anything     Likely looking at need for surgical intervention - timing per surgery team.           DVT Prophylaxis: lovenox  Diet: DIET FULL LIQUID;  Code Status: Full Code      Dispo - cc    Brooke Mccall MD

## 2020-10-07 NOTE — CARE COORDINATION
INITIAL CASE MANAGEMENT ASSESSMENT    Reviewed chart, met with patient to assess possible discharge needs. Explained Case Management role/services. Living Situation: Patient lives alone in a house with 3 steps to enter. ADLs: Independent     DME: None    PT/OT Recs: not ordered     Active Services: none     Transportation: active /family     Medications: [employer] or [employer] mail order/no barriers    PCP: Deng Mccann MD      HD/PD: N/A    PLAN/COMMENTS: Patient plans to return to home. SW/CM provided contact information for patient or family to call with any questions. SW/CM will follow and assist as needed. Electronically signed by Camelia Jackman RN on 10/7/2020 at 2:55 PM

## 2020-10-07 NOTE — PROGRESS NOTES
General and Vascular Surgery                                                           Daily Progress Note                                                             Ezekiel Courtney PA-C     Pt Name: Jose G Arellano Record Number: [id number]  Date of Birth 1965   Today's Date: 10/7/2020    Chief Complaint: abdominal pain    ASSESSMENT/PLAN  1. Sigmoid diverticulitis with perforation  2. Feeling better today  3. Leukocytosis improved 8.1-->12.3-->10.5-->7.7.   4. On full liquid diet. Gets full quick with some associated nausea. Continue current diet at this time  5. Will continue to monitor closely for any needs that may require surgical exploration  6. Monitor and control pain  7. OOB as tolerated  8. Continue IV antibiotics                EDUCATION  Patient educated about their illness/diagnosis, stated above, and all questions answered. We discussed the importance of nutrition, medications they are taking, and healthy lifestyle. SUBJECTIVE  Fanta has improved from yesterday. Pain is well controlled. OBJECTIVE  VITALS:  height is 5' 4\" (1.626 m) and weight is 189 lb 9.5 oz (86 kg). Her oral temperature is 97.3 °F (36.3 °C). Her blood pressure is 134/78 and her pulse is 57. Her respiration is 18 and oxygen saturation is 95%. VITALS:  /78   Pulse 57   Temp 97.3 °F (36.3 °C) (Oral)   Resp 18   Ht 5' 4\" (1.626 m)   Wt 189 lb 9.5 oz (86 kg)   SpO2 95%   BMI 32.54 kg/m²   GENERAL: alert, cooperative, no distress  ABDOMEN: non-distended and tenderness improved,  without rebound and guarding  I/O last 3 completed shifts:   In: 240 [P.O.:240]  Out: -   I/O this shift:  In: 120 [P.O.:120]  Out: -     LABS  Recent Labs     10/07/20  1019   WBC 7.7   HGB 11.6*   HCT 34.4*         K 3.5      CO2 26   BUN 8   CREATININE 0.6   MG 1.90   PHOS 3.8   CALCIUM 9.1   AST 18   ALT 7*   BILITOT <0.2   BILIDIR <0.2     CBC:   Lab Results Component Value Date    WBC 7.7 10/07/2020    RBC 3.98 10/07/2020    HGB 11.6 10/07/2020    HCT 34.4 10/07/2020    MCV 86.3 10/07/2020    MCH 29.2 10/07/2020    MCHC 33.8 10/07/2020    RDW 13.7 10/07/2020     10/07/2020    MPV 7.6 10/07/2020     CMP:    Lab Results   Component Value Date     10/07/2020    K 3.5 10/07/2020    K 4.3 10/04/2020     10/07/2020    CO2 26 10/07/2020    BUN 8 10/07/2020    CREATININE 0.6 10/07/2020    GFRAA >60 10/07/2020    GFRAA >60 03/22/2012    AGRATIO 1.1 10/04/2020    LABGLOM >60 10/07/2020    GLUCOSE 101 10/07/2020    PROT 7.0 10/07/2020    PROT 6.8 03/22/2012    LABALBU 3.5 10/07/2020    CALCIUM 9.1 10/07/2020    BILITOT <0.2 10/07/2020    ALKPHOS 78 10/07/2020    AST 18 10/07/2020    ALT 7 10/07/2020         Ana Smith PA-C  Electronically signed 10/7/2020 at 11:54 AM    Attending      As per note above by Wendy Mathews  Patient was personally seen and examined by me today  Chart, labs and imaging reviewed    A/P  Diverticulitis   Improving but slowly   Still with nausea which has required medication    Pain now isolated to RUQ, possible pocket of air vs abscess   If not improved tomorrow will check CT    Electronically signed by Adeel Rico MD on 10/7/2020 at 3:22 PM

## 2020-10-08 ENCOUNTER — APPOINTMENT (OUTPATIENT)
Dept: CT IMAGING | Age: 55
DRG: 391 | End: 2020-10-08
Payer: COMMERCIAL

## 2020-10-08 PROCEDURE — 6360000002 HC RX W HCPCS: Performed by: INTERNAL MEDICINE

## 2020-10-08 PROCEDURE — 74177 CT ABD & PELVIS W/CONTRAST: CPT

## 2020-10-08 PROCEDURE — 2580000003 HC RX 258: Performed by: INTERNAL MEDICINE

## 2020-10-08 PROCEDURE — 2500000003 HC RX 250 WO HCPCS: Performed by: INTERNAL MEDICINE

## 2020-10-08 PROCEDURE — 6360000004 HC RX CONTRAST MEDICATION

## 2020-10-08 PROCEDURE — 6370000000 HC RX 637 (ALT 250 FOR IP): Performed by: INTERNAL MEDICINE

## 2020-10-08 PROCEDURE — 99232 SBSQ HOSP IP/OBS MODERATE 35: CPT | Performed by: SURGERY

## 2020-10-08 PROCEDURE — 94760 N-INVAS EAR/PLS OXIMETRY 1: CPT

## 2020-10-08 PROCEDURE — 1200000000 HC SEMI PRIVATE

## 2020-10-08 PROCEDURE — 99254 IP/OBS CNSLTJ NEW/EST MOD 60: CPT | Performed by: INTERNAL MEDICINE

## 2020-10-08 RX ORDER — BUPROPION HYDROCHLORIDE 100 MG/1
100 TABLET ORAL 2 TIMES DAILY
Status: DISCONTINUED | OUTPATIENT
Start: 2020-10-08 | End: 2020-10-09 | Stop reason: HOSPADM

## 2020-10-08 RX ORDER — LIOTHYRONINE SODIUM 5 UG/1
10 TABLET ORAL DAILY
Status: DISCONTINUED | OUTPATIENT
Start: 2020-10-08 | End: 2020-10-09 | Stop reason: HOSPADM

## 2020-10-08 RX ADMIN — SODIUM CHLORIDE, POTASSIUM CHLORIDE, SODIUM LACTATE AND CALCIUM CHLORIDE: 600; 310; 30; 20 INJECTION, SOLUTION INTRAVENOUS at 06:44

## 2020-10-08 RX ADMIN — CEFTRIAXONE 1 G: 1 INJECTION, POWDER, FOR SOLUTION INTRAMUSCULAR; INTRAVENOUS at 07:08

## 2020-10-08 RX ADMIN — BUPROPION HYDROCHLORIDE 100 MG: 100 TABLET, FILM COATED ORAL at 11:40

## 2020-10-08 RX ADMIN — BUPROPION HYDROCHLORIDE 100 MG: 100 TABLET, FILM COATED ORAL at 21:19

## 2020-10-08 RX ADMIN — IOHEXOL 50 ML: 240 INJECTION, SOLUTION INTRATHECAL; INTRAVASCULAR; INTRAVENOUS; ORAL at 10:44

## 2020-10-08 RX ADMIN — GUAIFENESIN 600 MG: 600 TABLET ORAL at 20:19

## 2020-10-08 RX ADMIN — FLUCONAZOLE 200 MG: 200 INJECTION, SOLUTION INTRAVENOUS at 10:40

## 2020-10-08 RX ADMIN — METRONIDAZOLE 500 MG: 500 INJECTION, SOLUTION INTRAVENOUS at 20:19

## 2020-10-08 RX ADMIN — METRONIDAZOLE 500 MG: 500 INJECTION, SOLUTION INTRAVENOUS at 14:55

## 2020-10-08 RX ADMIN — METRONIDAZOLE 500 MG: 500 INJECTION, SOLUTION INTRAVENOUS at 06:07

## 2020-10-08 RX ADMIN — GUAIFENESIN 600 MG: 600 TABLET ORAL at 08:40

## 2020-10-08 RX ADMIN — ENOXAPARIN SODIUM 40 MG: 40 INJECTION SUBCUTANEOUS at 08:40

## 2020-10-08 RX ADMIN — LIOTHYRONINE SODIUM 10 MCG: 5 TABLET ORAL at 11:41

## 2020-10-08 SDOH — HEALTH STABILITY: MENTAL HEALTH: HOW OFTEN DO YOU HAVE A DRINK CONTAINING ALCOHOL?: PATIENT DECLINED

## 2020-10-08 ASSESSMENT — ENCOUNTER SYMPTOMS
NAUSEA: 0
VOMITING: 0

## 2020-10-08 ASSESSMENT — PAIN SCALES - GENERAL: PAINLEVEL_OUTOF10: 0

## 2020-10-08 NOTE — PROGRESS NOTES
Hospitalist Progress Note      PCP: Edwin Harkins MD    Date of Admission: 10/4/2020    Chief Complaint: abd pain. Subjective:     Nausea better with phenergan. Abd pain improved. Small BM. Medications:  Reviewed    Infusion Medications    lactated ringers 125 mL/hr at 10/08/20 0644     Scheduled Medications    liothyronine  10 mcg Oral Daily    buPROPion  100 mg Oral BID    guaiFENesin  600 mg Oral BID    sodium chloride flush  10 mL Intravenous 2 times per day    enoxaparin  40 mg Subcutaneous Daily    metroNIDAZOLE  500 mg Intravenous Q8H    cefTRIAXone (ROCEPHIN) IV  1 g Intravenous Q24H    fluconazole  200 mg Intravenous Q24H     PRN Meds: iohexol, promethazine, ondansetron (ZOFRAN) IVPB, sodium chloride flush, ondansetron **OR** [DISCONTINUED] ondansetron, potassium chloride, magnesium sulfate, morphine      Intake/Output Summary (Last 24 hours) at 10/8/2020 1156  Last data filed at 10/8/2020 1029  Gross per 24 hour   Intake 1590 ml   Output --   Net 1590 ml       Physical Exam Performed:    BP (!) 168/65   Pulse 66   Temp 98.3 °F (36.8 °C) (Oral)   Resp 16   Ht 5' 4\" (1.626 m)   Wt 190 lb 4.1 oz (86.3 kg)   SpO2 96%   BMI 32.66 kg/m²     General appearance: No apparent distress, appears stated age and cooperative. HEENT: Pupils equal, round, and reactive to light. Conjunctivae/corneas clear. Neck: Supple, with full range of motion. No jugular venous distention. Trachea midline. Respiratory:  Normal respiratory effort. Clear to auscultation, bilaterally without Rales/Wheezes/Rhonchi. Cardiovascular: Regular rate and rhythm with normal S1/S2 without murmurs, rubs or gallops. Abdomen: tender suprapubic, LLQ and RUQ regions. No rebound. Musculoskeletal: No clubbing, cyanosis or edema bilaterally. Full range of motion without deformity. Skin: Skin color, texture, turgor normal.  No rashes or lesions.   Neurologic:  Neurovascularly intact without any focal sensory/motor deficits. Cranial nerves: II-XII intact, grossly non-focal.  Psychiatric: Alert and oriented, thought content appropriate, normal insight  Capillary Refill: Brisk,< 3 seconds   Peripheral Pulses: +2 palpable, equal bilaterally       Labs:   Recent Labs     10/06/20  0958 10/07/20  1019   WBC 10.5 7.7   HGB 10.9* 11.6*   HCT 32.6* 34.4*    394     Recent Labs     10/06/20  0958 10/07/20  1019    142   K 4.1 3.5    101   CO2 27 26   BUN 8 8   CREATININE 0.6 0.6   CALCIUM 8.9 9.1   PHOS  --  3.8     Recent Labs     10/07/20  1019   AST 18   ALT 7*   BILIDIR <0.2   BILITOT <0.2   ALKPHOS 78     No results for input(s): INR in the last 72 hours. No results for input(s): Jaron Snowball in the last 72 hours. Urinalysis:      Lab Results   Component Value Date    NITRU Negative 10/04/2020    WBCUA 2 10/04/2020    RBCUA 7 10/04/2020    BLOODU Negative 10/04/2020    SPECGRAV 1.027 10/04/2020    GLUCOSEU Negative 10/04/2020       Radiology:  XR CHEST PORTABLE   Final Result   No acute cardiopulmonary findings. CT ABDOMEN PELVIS W IV CONTRAST Additional Contrast? Oral   Final Result   1. Acute perforated sigmoid diverticulitis with a small amount of   pneumoperitoneum. There is a small amount of free fluid in the pelvis   without drainable fluid collection. Critical results were called by Dr. Kadeem Gaffney. Easton Coley MD to [facility] on   10/4/2020 at 04:26. CT ABDOMEN PELVIS W IV CONTRAST Additional Contrast? Oral    (Results Pending)           Assessment/Plan:    Active Hospital Problems    Diagnosis    Diverticulitis [K57.92]       Acute Complicated diverticulitis with peritonitis secondary to perforation. On IV rocephin and flagyl. Diflucan was added. Surgery involved. Cont IVF support and pain control. Still very nauseated   - resistant to zofran. - add phenergan. - add zofran extended infusion.     Likely looking at need for surgical intervention - timing per surgery team.   Repeat CT abd pelvis with PO contrast today - avoid barrium contrast in situation of perforation - will use omnipaque or gastrographin.          DVT Prophylaxis: lovenox  Diet: DIET FULL LIQUID;  Dietary Nutrition Supplements: Standard High Calorie Oral Supplement  Code Status: Full Code      Dispo - cc    Jennifer Amaya MD

## 2020-10-08 NOTE — PROGRESS NOTES
Pt tolerating full liquid diet. Pt felt a little nauseous but no prn antiemetic needed. Will continue to monitor.

## 2020-10-08 NOTE — PLAN OF CARE
Problem: Pain:  Goal: Pain level will decrease  Description: Pain level will decrease  10/8/2020 1055 by Debbie Townsend RN  Outcome: Ongoing     Problem: Pain:  Goal: Control of acute pain  Description: Control of acute pain  10/8/2020 1055 by Debbie Townsend RN  Outcome: Ongoing     Problem: Pain:  Goal: Control of chronic pain  Description: Control of chronic pain  10/8/2020 1055 by Debbie Townsend RN  Outcome: Ongoing     Problem: Infection:  Goal: Will remain free from infection  Description: Will remain free from infection  10/8/2020 1055 by Debbie Townsend RN  Outcome: Ongoing     Problem: Safety:  Goal: Free from accidental physical injury  Description: Free from accidental physical injury  10/8/2020 1055 by Debbie Townsend RN  Outcome: Ongoing     Problem: Safety:  Goal: Free from intentional harm  Description: Free from intentional harm  10/8/2020 1055 by Debbie Townsend RN  Outcome: Ongoing     Problem: Daily Care:  Goal: Daily care needs are met  Description: Daily care needs are met  10/8/2020 1055 by Debbie Townsend RN  Outcome: Ongoing     Problem: Skin Integrity:  Goal: Skin integrity will stabilize  Description: Skin integrity will stabilize  10/8/2020 1055 by Debbie Townsend RN  Outcome: Ongoing     Problem: Discharge Planning:  Goal: Patients continuum of care needs are met  Description: Patients continuum of care needs are met  10/8/2020 1055 by Debbie Townsend RN  Outcome: Ongoing

## 2020-10-08 NOTE — PLAN OF CARE
Problem: Pain:  Goal: Pain level will decrease  Description: Pain level will decrease  10/8/2020 0008 by Gretel Goodson  Outcome: Ongoing  10/7/2020 1454 by Alesha Bee RN  Outcome: Ongoing  Note: Pain/discomfort being managed with PRN analgesics per MD order. Pt able to express presence and absence of pain and rate pain appropriately using numerical scale    Goal: Control of acute pain  Description: Control of acute pain  Outcome: Ongoing  Goal: Control of chronic pain  Description: Control of chronic pain  Outcome: Ongoing     Problem: Infection:  Goal: Will remain free from infection  Description: Will remain free from infection  10/8/2020 0008 by Gretel Goodson  Outcome: Ongoing  10/7/2020 1454 by Alesha Bee RN  Outcome: Ongoing     Problem: Safety:  Goal: Free from accidental physical injury  Description: Free from accidental physical injury  10/8/2020 0008 by Gretel Goodson  Outcome: Ongoing  10/7/2020 1454 by Alesha Bee RN  Outcome: Ongoing  Note: Pt A&O aware of her abilities. Pt understands and knows to call when in need of ambulation. Measures were made to prevent accidental needle stick, friction, shear, etc. Environment kept free of clutter and adequate lighting provided. Will continue to monitor for physical injury. Goal: Free from intentional harm  Description: Free from intentional harm  Outcome: Ongoing     Problem: Daily Care:  Goal: Daily care needs are met  Description: Daily care needs are met  10/8/2020 0008 by Gretel Goodson  Outcome: Ongoing  10/7/2020 1454 by Alesha Bee RN  Outcome: Ongoing  Note: Patient able to meet own daily care needs at this time. Will assist with ADLs as needed and encourage independence. Problem: Skin Integrity:  Goal: Skin integrity will stabilize  Description: Skin integrity will stabilize  10/8/2020 0008 by Gretel Goodson  Outcome: Ongoing  10/7/2020 1454 by Alesha Bee RN  Outcome: Ongoing  Note: Skin assessment completed every shift.   Pt assessed for incontinence, appropriate barrier cream applied prn as needed. Pt encouraged to turn/rotate every 2 hours. Assistance provided if pt unable to do so themselves.         Problem: Discharge Planning:  Goal: Patients continuum of care needs are met  Description: Patients continuum of care needs are met  10/8/2020 0008 by [facility]  Outcome: Ongoing  10/7/2020 1454 by Alesha Bee RN  Outcome: Ongoing

## 2020-10-08 NOTE — PROGRESS NOTES
the mA/kV was utilized to reduce the radiation dose to as low as reasonably achievable. COMPARISON: 10/04/2020 HISTORY: ORDERING SYSTEM PROVIDED HISTORY: perforated diverticulitis, follow up study. TECHNOLOGIST PROVIDED HISTORY: Reason for exam:->perforated diverticulitis, follow up study. Additional Contrast?->Oral Reason for Exam: perforated diverticulitis, follow up study. Acuity: Unknown Type of Exam: Ongoing FINDINGS: Lower Chest: Bandlike opacity seen in the right middle lobe. Bandlike opacity seen in the right lower lobe. Trace pleural fluid is seen bilaterally. There is adjacent consolidation of the lung bases. Organs: Calcified granuloma seen within the spleen. No perisplenic fluid Adrenal glands appear normal No hydronephrosis on the right. No hydronephrosis on the left. Punctate hypodense nodule seen in left kidney, too small to characterize, likely cyst. There is decreased free air throughout the abdomen. Hypodense nodule posteriorly in the right hepatic lobe appears unchanged. . This has peripheral puddling of contrast suggesting hemangioma. Increased density seen within the gallbladder, likely stones or sludge. No peripancreatic inflammatory change. GI/Bowel: No significant small bowel distention noted. Mild stool load seen in the colon. Scattered colonic diverticula are seen. There is persistent abnormal wall thickening of the sigmoid colon in the pelvis with surrounding inflammatory change. A focal collection of fluid and gas is seen in the left hemipelvis, measuring 3.8 cm x 2.9 cm. This again remains predominantly gas-filled. Pelvis: There is a developing fluid collection seen anteriorly in the right adnexa measuring 4.6 x 3.0 cm. There is mild bladder wall thickening. Bladder is incompletely distended There is mild small bowel wall thickening seen in the pelvis. Peritoneum/Retroperitoneum: No aortic aneurysm. Scattered small retroperitoneal nodes are seen.  Bones/Soft Tissues: Spurring is seen in the spine and hips. Tiny periumbilical hernia containing fat is seen Stranding is seen in the abdomen the thigh suggesting prior surgery     Decreased free air throughout the abdomen, secondary to perforated diverticulitis. While the focal collection of gas and fluid in the left hemipelvis appears similar, there is a developing fluid collection anteriorly in the pelvis in the right adnexal region. This could represent early abscess Mild bladder wall thickening, either reactive or due to underlying cystitis.      Assessment//Plan           Hospital Problems           Last Modified POA    Diverticulitis 10/4/2020 Yes        Assessment & Plan    Sigmoid diverticulitis   Improving clinically   Less pain, nausea resolved   Advancing diet    CT was repeated by Medical team today   Air gas collection persists   New fluid collection RLQ/adnexa     Given her improving situation and plan for elective colectomy we do not need to intervene at this time   Continue antibiotics   If her condition worsens we will proceed with colectomy sooner than planned    If stable tonight will try for discharge in AM    Electronically signed by Jenni Gagnon MD on 10/8/2020 at 4:01 PM    Electronically signed by Jenni Gagnon MD on 10/8/20 at 4:00 PM EDT

## 2020-10-09 VITALS
HEIGHT: 64 IN | TEMPERATURE: 97.6 F | SYSTOLIC BLOOD PRESSURE: 132 MMHG | OXYGEN SATURATION: 96 % | HEART RATE: 88 BPM | BODY MASS INDEX: 32.03 KG/M2 | DIASTOLIC BLOOD PRESSURE: 90 MMHG | WEIGHT: 187.61 LBS | RESPIRATION RATE: 18 BRPM

## 2020-10-09 LAB
ALBUMIN SERPL-MCNC: 3.5 G/DL (ref 3.4–5)
ANION GAP SERPL CALCULATED.3IONS-SCNC: 12 MMOL/L (ref 3–16)
BASOPHILS ABSOLUTE: 0.1 K/UL (ref 0–0.2)
BASOPHILS RELATIVE PERCENT: 1.5 %
BUN BLDV-MCNC: 3 MG/DL (ref 7–20)
C-REACTIVE PROTEIN: 74.8 MG/L (ref 0–5.1)
CALCIUM SERPL-MCNC: 9.1 MG/DL (ref 8.3–10.6)
CHLORIDE BLD-SCNC: 100 MMOL/L (ref 99–110)
CO2: 27 MMOL/L (ref 21–32)
CREAT SERPL-MCNC: 0.6 MG/DL (ref 0.6–1.1)
EOSINOPHILS ABSOLUTE: 0.1 K/UL (ref 0–0.6)
EOSINOPHILS RELATIVE PERCENT: 1.7 %
GFR AFRICAN AMERICAN: >60
GFR NON-AFRICAN AMERICAN: >60
GLUCOSE BLD-MCNC: 126 MG/DL (ref 70–99)
HCT VFR BLD CALC: 36.3 % (ref 36–48)
HEMOGLOBIN: 12.3 G/DL (ref 12–16)
LYMPHOCYTES ABSOLUTE: 1 K/UL (ref 1–5.1)
LYMPHOCYTES RELATIVE PERCENT: 15 %
MAGNESIUM: 2 MG/DL (ref 1.8–2.4)
MCH RBC QN AUTO: 29.4 PG (ref 26–34)
MCHC RBC AUTO-ENTMCNC: 33.8 G/DL (ref 31–36)
MCV RBC AUTO: 86.7 FL (ref 80–100)
MONOCYTES ABSOLUTE: 0.4 K/UL (ref 0–1.3)
MONOCYTES RELATIVE PERCENT: 6.7 %
NEUTROPHILS ABSOLUTE: 4.9 K/UL (ref 1.7–7.7)
NEUTROPHILS RELATIVE PERCENT: 75.1 %
PDW BLD-RTO: 13.8 % (ref 12.4–15.4)
PHOSPHORUS: 4.2 MG/DL (ref 2.5–4.9)
PLATELET # BLD: 432 K/UL (ref 135–450)
PMV BLD AUTO: 7.4 FL (ref 5–10.5)
POTASSIUM SERPL-SCNC: 3.4 MMOL/L (ref 3.5–5.1)
RBC # BLD: 4.19 M/UL (ref 4–5.2)
SEDIMENTATION RATE, ERYTHROCYTE: 90 MM/HR (ref 0–30)
SODIUM BLD-SCNC: 139 MMOL/L (ref 136–145)
WBC # BLD: 6.5 K/UL (ref 4–11)

## 2020-10-09 PROCEDURE — 85652 RBC SED RATE AUTOMATED: CPT

## 2020-10-09 PROCEDURE — 36415 COLL VENOUS BLD VENIPUNCTURE: CPT

## 2020-10-09 PROCEDURE — 80069 RENAL FUNCTION PANEL: CPT

## 2020-10-09 PROCEDURE — 2580000003 HC RX 258: Performed by: INTERNAL MEDICINE

## 2020-10-09 PROCEDURE — 2500000003 HC RX 250 WO HCPCS: Performed by: INTERNAL MEDICINE

## 2020-10-09 PROCEDURE — 6360000002 HC RX W HCPCS: Performed by: INTERNAL MEDICINE

## 2020-10-09 PROCEDURE — 85025 COMPLETE CBC W/AUTO DIFF WBC: CPT

## 2020-10-09 PROCEDURE — 83735 ASSAY OF MAGNESIUM: CPT

## 2020-10-09 PROCEDURE — 94760 N-INVAS EAR/PLS OXIMETRY 1: CPT

## 2020-10-09 PROCEDURE — 86140 C-REACTIVE PROTEIN: CPT

## 2020-10-09 PROCEDURE — 6370000000 HC RX 637 (ALT 250 FOR IP): Performed by: INTERNAL MEDICINE

## 2020-10-09 RX ORDER — METRONIDAZOLE 500 MG/1
500 TABLET ORAL EVERY 8 HOURS SCHEDULED
Status: DISCONTINUED | OUTPATIENT
Start: 2020-10-09 | End: 2020-10-09 | Stop reason: HOSPADM

## 2020-10-09 RX ORDER — PROMETHAZINE HYDROCHLORIDE 12.5 MG/1
25 TABLET ORAL EVERY 8 HOURS PRN
Qty: 30 TABLET | Refills: 0 | Status: SHIPPED | OUTPATIENT
Start: 2020-10-09 | End: 2020-10-16

## 2020-10-09 RX ORDER — METRONIDAZOLE 500 MG/1
500 TABLET ORAL EVERY 8 HOURS SCHEDULED
Qty: 42 TABLET | Refills: 0 | Status: SHIPPED | OUTPATIENT
Start: 2020-10-09 | End: 2020-10-23

## 2020-10-09 RX ORDER — LEVOFLOXACIN 500 MG/1
500 TABLET, FILM COATED ORAL DAILY
Qty: 14 TABLET | Refills: 0 | Status: SHIPPED | OUTPATIENT
Start: 2020-10-09 | End: 2020-10-23

## 2020-10-09 RX ORDER — METRONIDAZOLE 500 MG/1
500 TABLET ORAL EVERY 8 HOURS
Status: DISCONTINUED | OUTPATIENT
Start: 2020-10-09 | End: 2020-10-09

## 2020-10-09 RX ORDER — LEVOFLOXACIN 500 MG/1
500 TABLET, FILM COATED ORAL DAILY
Status: DISCONTINUED | OUTPATIENT
Start: 2020-10-09 | End: 2020-10-09 | Stop reason: HOSPADM

## 2020-10-09 RX ADMIN — METRONIDAZOLE 500 MG: 500 TABLET ORAL at 11:11

## 2020-10-09 RX ADMIN — CEFTRIAXONE 1 G: 1 INJECTION, POWDER, FOR SOLUTION INTRAMUSCULAR; INTRAVENOUS at 06:20

## 2020-10-09 RX ADMIN — METRONIDAZOLE 500 MG: 500 INJECTION, SOLUTION INTRAVENOUS at 07:00

## 2020-10-09 RX ADMIN — LIOTHYRONINE SODIUM 10 MCG: 5 TABLET ORAL at 08:40

## 2020-10-09 RX ADMIN — SODIUM CHLORIDE, POTASSIUM CHLORIDE, SODIUM LACTATE AND CALCIUM CHLORIDE: 600; 310; 30; 20 INJECTION, SOLUTION INTRAVENOUS at 04:10

## 2020-10-09 RX ADMIN — LEVOFLOXACIN 500 MG: 500 TABLET, FILM COATED ORAL at 11:11

## 2020-10-09 RX ADMIN — BUPROPION HYDROCHLORIDE 100 MG: 100 TABLET, FILM COATED ORAL at 08:40

## 2020-10-09 RX ADMIN — GUAIFENESIN 600 MG: 600 TABLET ORAL at 08:40

## 2020-10-09 NOTE — PROGRESS NOTES
General Surgery    Feeling well overall    Home today    Plan for elective colectomy in 6-8 weeks if stable  Otherwise will intervene sooner if symptoms worsen    Electronically signed by Myra Ramirez MD on 10/9/2020 at 3:11 PM

## 2020-10-09 NOTE — DISCHARGE SUMMARY
Hospital Medicine Discharge Summary    Patient ID: Laurence Gilford      Patient's PCP: Lisbeth Lesch, MD    Admit Date: 10/4/2020     Discharge Date:   10/9/2020    Admitting Physician: Carolee Ogden MD     Discharge Physician: David Mcdaniel MD     Discharge Diagnoses: Active Hospital Problems    Diagnosis    Diverticulitis [K57.92]       The patient was seen and examined on day of discharge and this discharge summary is in conjunction with any daily progress note from day of discharge. Hospital Course: 49yo woman presented with 3 days subacute onset and progressively worsening abd pain associated with abd cramping and nausea. Acute Complicated diverticulitis with peritonitis secondary to perforation. On IV rocephin and flagyl. Diflucan was added - stopped per ID   Surgery involved. Repeat CT with improvement, though possibly small area of early abscess - surgery team is not planning intervention this admit       Will plan for 2 weeks Abx on discharge - switch to PO today to see if she can tolerate that. Will follow with Dr Fatoumata Mooney in office for surgical plans.          Physical Exam Performed:     BP (!) 155/94   Pulse 65   Temp 98.1 °F (36.7 °C) (Oral)   Resp 18   Ht 5' 4\" (1.626 m)   Wt 187 lb 9.8 oz (85.1 kg)   SpO2 95%   BMI 32.20 kg/m²       General appearance:  No apparent distress, appears stated age and cooperative. HEENT:  Normal cephalic, atraumatic without obvious deformity. Pupils equal, round, and reactive to light. Extra ocular muscles intact. Conjunctivae/corneas clear. Neck: Supple, with full range of motion. No jugular venous distention. Trachea midline. Respiratory:  Normal respiratory effort. Clear to auscultation, bilaterally without Rales/Wheezes/Rhonchi. Cardiovascular:  Regular rate and rhythm with normal S1/S2 without murmurs, rubs or gallops. Abdomen: Soft, non-tender, non-distended with normal bowel sounds.   Musculoskeletal:  No clubbing, cyanosis or edema bilaterally. Full range of motion without deformity. Skin: Skin color, texture, turgor normal.  No rashes or lesions. Neurologic:  Neurovascularly intact without any focal sensory/motor deficits. Cranial nerves: II-XII intact, grossly non-focal.  Psychiatric:  Alert and oriented, thought content appropriate, normal insight  Capillary Refill: Brisk,< 3 seconds   Peripheral Pulses: +2 palpable, equal bilaterally       Labs: For convenience and continuity at follow-up the following most recent labs are provided:      CBC:    Lab Results   Component Value Date    WBC 6.5 10/09/2020    HGB 12.3 10/09/2020    HCT 36.3 10/09/2020     10/09/2020       Renal:    Lab Results   Component Value Date     10/09/2020    K 3.4 10/09/2020    K 4.3 10/04/2020     10/09/2020    CO2 27 10/09/2020    BUN 3 10/09/2020    CREATININE 0.6 10/09/2020    CALCIUM 9.1 10/09/2020    PHOS 4.2 10/09/2020         Significant Diagnostic Studies    Radiology:   CT ABDOMEN PELVIS W IV CONTRAST Additional Contrast? Oral   Final Result   Decreased free air throughout the abdomen, secondary to perforated   diverticulitis. While the focal collection of gas and fluid in the left   hemipelvis appears similar, there is a developing fluid collection anteriorly   in the pelvis in the right adnexal region. This could represent early abscess      Mild bladder wall thickening, either reactive or due to underlying cystitis. XR CHEST PORTABLE   Final Result   No acute cardiopulmonary findings. CT ABDOMEN PELVIS W IV CONTRAST Additional Contrast? Oral   Final Result   1. Acute perforated sigmoid diverticulitis with a small amount of   pneumoperitoneum. There is a small amount of free fluid in the pelvis   without drainable fluid collection. Critical results were called by Dr. Silva Hooks. Angie Roberts MD to [facility] on   10/4/2020 at 04:26.                 Consults:     IP CONSULT TO GENERAL SURGERY  IP CONSULT TO SPIRITUAL SERVICES  IP CONSULT TO INFECTIOUS DISEASES    Disposition:  home    Condition at Discharge: Stable    Discharge Instructions/Follow-up:  Surgery 1 week. Code Status:  Full Code     Activity: activity as tolerated    Diet: low fiber      Discharge Medications:     Current Discharge Medication List           Details   levoFLOXacin (LEVAQUIN) 500 MG tablet Take 1 tablet by mouth daily for 14 days  Qty: 14 tablet, Refills: 0      metroNIDAZOLE (FLAGYL) 500 MG tablet Take 1 tablet by mouth every 8 hours for 14 days  Qty: 42 tablet, Refills: 0      promethazine (PHENERGAN) 12.5 MG tablet Take 2 tablets by mouth every 8 hours as needed for Nausea  Qty: 30 tablet, Refills: 0              Details   gabapentin (NEURONTIN) 100 MG capsule TAKE 1 CAPSULE BY MOUTH EVERY MORNING AND TAKE 4 CAPSULES BY MOUTH EVERY EVENING  Qty: 450 capsule, Refills: 0    Associated Diagnoses: Neuritis; Menopausal vasomotor syndrome      buPROPion (WELLBUTRIN XL) 150 MG extended release tablet TAKE ONE TABLET BY MOUTH EVERY MORNING  Qty: 90 tablet, Refills: 0    Associated Diagnoses: Anxiety      fluocinonide (LIDEX) 0.05 % ointment Apply sparingly to affected area(s) bid prn for flares, up to 2 weeks at a time. Do not apply on cleared skin. Qty: 30 g, Refills: 1      levocetirizine (XYZAL) 5 MG tablet TAKE ONE TABLET BY MOUTH NIGHTLY  Qty: 90 tablet, Refills: 3    Associated Diagnoses: Vasomotor rhinitis      liothyronine (CYTOMEL) 5 MCG tablet Take 2 tablets by mouth daily  Qty: 180 tablet, Refills: 3    Associated Diagnoses: Acquired underactive thyroid      Multiple Vitamin (MULTIVITAMINS PO) Take 1 tablet by mouth daily       Ascorbic Acid (VITAMIN C) 500 MG tablet Take 500 mg by mouth daily. magnesium 30 MG tablet Take 400 mg by mouth daily       zinc gluconate 50 MG tablet Take 50 mg by mouth daily.       ipratropium (ATROVENT) 0.03 % nasal spray INSTILL 2 SPRAYS IN EACH NOSTRIL EVERY 12 HOURS  Qty: 30 mL, Refills: 3    Associated Diagnoses: Vasomotor rhinitis      scopolamine (TRANSDERM-SCOP, 1.5 MG,) transdermal patch Place 1 patch onto the skin every 72 hours Apply to behind ear starting 4 hours before travel. Qty: 10 patch, Refills: 0      Omega-3 Fatty Acids (FISH OIL) 1000 MG CAPS Take 1,000 mg by mouth daily      loratadine (CLARITIN) 10 MG tablet Take 10 mg by mouth daily as needed              Time Spent on discharge is more than 30 minutes in the examination, evaluation, counseling and review of medications and discharge plan. Signed:    David Garza MD   10/9/2020      Thank you Steven Ann MD for the opportunity to be involved in this patient's care. If you have any questions or concerns please feel free to contact me at [phone].

## 2020-10-09 NOTE — PLAN OF CARE
Problem: Pain:  Goal: Pain level will decrease  Description: Pain level will decrease  10/8/2020 2248 by Sukumar Curiel  Outcome: Ongoing  10/8/2020 1055 by Unruly Duke RN  Outcome: Ongoing  Goal: Control of acute pain  Description: Control of acute pain  10/8/2020 2248 by Sukumar Curiel  Outcome: Ongoing  10/8/2020 1055 by Unruly Duke RN  Outcome: Ongoing  Goal: Control of chronic pain  Description: Control of chronic pain  10/8/2020 2248 by Sukumar Curiel  Outcome: Ongoing  10/8/2020 1055 by Unruly Duke RN  Outcome: Ongoing     Problem: Infection:  Goal: Will remain free from infection  Description: Will remain free from infection  10/8/2020 2248 by Sukumar Curiel  Outcome: Ongoing  10/8/2020 1055 by Unruly Duke RN  Outcome: Ongoing     Problem: Safety:  Goal: Free from accidental physical injury  Description: Free from accidental physical injury  10/8/2020 2248 by Sukumar Curiel  Outcome: Ongoing  10/8/2020 1055 by Unruly Duke RN  Outcome: Ongoing  Goal: Free from intentional harm  Description: Free from intentional harm  10/8/2020 2248 by Sukumar Curiel  Outcome: Ongoing  10/8/2020 1055 by Unruly Duke RN  Outcome: Ongoing     Problem: Daily Care:  Goal: Daily care needs are met  Description: Daily care needs are met  10/8/2020 2248 by Sukumar Curiel  Outcome: Ongoing  10/8/2020 1055 by Unruly Duke RN  Outcome: Ongoing     Problem: Skin Integrity:  Goal: Skin integrity will stabilize  Description: Skin integrity will stabilize  10/8/2020 2248 by Sukumar Curiel  Outcome: Ongoing  10/8/2020 1055 by Unruly Duke RN  Outcome: Ongoing     Problem: Discharge Planning:  Goal: Patients continuum of care needs are met  Description: Patients continuum of care needs are met  10/8/2020 2248 by Sukumar Curiel  Outcome: Ongoing  10/8/2020 1055 by Unruly Duke RN  Outcome: Ongoing

## 2020-10-09 NOTE — PROGRESS NOTES
Pt given discharge instructions. Pt verbalized understanding. IV removed per protocol. Dressing is clean, dry, and intact. Pt discharged.

## 2020-10-09 NOTE — PLAN OF CARE
Problem: Pain:  Goal: Pain level will decrease  Description: Pain level will decrease  10/9/2020 0803 by Kavon Redding RN  Outcome: Ongoing     Problem: Pain:  Goal: Control of acute pain  Description: Control of acute pain  10/9/2020 0803 by Kavon Redding RN  Outcome: Ongoing     Problem: Pain:  Goal: Control of chronic pain  Description: Control of chronic pain  10/9/2020 0803 by Kavon Redding RN  Outcome: Ongoing     Problem: Infection:  Goal: Will remain free from infection  Description: Will remain free from infection  10/9/2020 0803 by Kavon Redding RN  Outcome: Ongoing     Problem: Safety:  Goal: Free from accidental physical injury  Description: Free from accidental physical injury  10/9/2020 0803 by Kavon Redding RN  Outcome: Ongoing     Problem: Safety:  Goal: Free from intentional harm  Description: Free from intentional harm  10/9/2020 0803 by Kavon Redding RN  Outcome: Ongoing     Problem: Daily Care:  Goal: Daily care needs are met  Description: Daily care needs are met  10/9/2020 0803 by Kavon Redding RN  Outcome: Ongoing     Problem: Skin Integrity:  Goal: Skin integrity will stabilize  Description: Skin integrity will stabilize  10/9/2020 0803 by Kavon Redding RN  Outcome: Ongoing     Problem: Discharge Planning:  Goal: Patients continuum of care needs are met  Description: Patients continuum of care needs are met  10/9/2020 0803 by Kavon Redding RN  Outcome: Ongoing

## 2020-10-09 NOTE — CONSULTS
Infectious Diseases Inpatient Consult Note    Reason for Consult:   Diverticulitis, perferated colon  Requesting Physician:   Dr Darya Reyes  Primary Care Physician:  Burgess Carlos MD  History Obtained From:   Pt, EPIC    Admit Date: 10/4/2020  Hospital Day: 11    CHIEF COMPLAINT:       Chief Complaint   Patient presents with    Abdominal Pain     started with LLQ pain wednesday, complaints of constipation, pain all over abdomen now, RLQ pain now       HISTORY OF PRESENT ILLNESS:      55 yo woman with hx diverticulitis, asthma, hypothyroid, BMI 32    Presents with abd pain, 3 days, LLQ, sharp / cramp, worsening over time  In ED 10/4 - afeb, WBC 8.1  CT - free air, sigmoid diverticulitis  Admit, stated on ceftriaxone + metronidazole + fluconazole  Seen by Surg, no surg intervention, plan for later elective colectomy     Today 10/8 - afeb, WBC 7.7  Pt improved, taking full liquid diet,   f/u CT w less free air, fluid collection    Past Medical History:    Past Medical History:   Diagnosis Date    Acquired underactive thyroid 4/20/2017    Asthma     when have colds goes into asthma    Chronic allergic rhinitis 4/3/2018    Diverticulitis 2006    colon 2006    Family history of Alzheimer's disease 11/15/2016    Hypercholesteremia due to HDL > 100 10/17/2013    no meds    Primary osteoarthritis of first carpometacarpal joint of left hand 9/25/2015       Past Surgical History:    Past Surgical History:   Procedure Laterality Date   Ally Peterson COSMETIC SURGERY      laser liposuction-abdomen and thighs    EYE SURGERY      corrective    FINGER TRIGGER RELEASE Left 5/19/16    Middle Finger    HAND SURGERY Left 5/8/14    Middle Finger Ulnar Digital Nerve contusion and neuropraxia       Current Medications:     liothyronine  10 mcg Oral Daily    buPROPion  100 mg Oral BID    guaiFENesin  600 mg Oral BID    sodium chloride flush  10 mL Intravenous 2 times per day    enoxaparin  40 mg Subcutaneous Daily    metroNIDAZOLE  500 mg Intravenous Q8H    cefTRIAXone (ROCEPHIN) IV  1 g Intravenous Q24H    fluconazole  200 mg Intravenous Q24H       Allergies: Other; Penicillins; and Nickel    Social History:    TOBACCO:    None   ETOH:    Occasional   DRUGS:   None   MARITAL STATUS:   Single      OCCUPATION:   Physical therapist     Family History:   No immunodeficiency    REVIEW OF SYSTEMS:    No fever / chills / sweats. No weight loss. No visual change, eye pain, eye discharge. No oral lesion, sore throat, dysphagia. Denies cough / sputum. Denies chest pain, palpitations. Denies n / v / abd pain. No diarrhea. Denies dysuria or change in urinary function. Denies joint swelling or pain. No myalgia, arthralgia. Denies skin changes, itching  Denies focal weakness, sensory change or other neurologic symptom    Denies new / worse depression, psychiatric symptoms  Denies any Endocrine or Hematologic symptoms    PHYSICAL EXAM:      Vitals:    BP (!) 160/96   Pulse 88   Temp 98.2 °F (36.8 °C) (Oral)   Resp 18   Ht 5' 4\" (1.626 m)   Wt 190 lb 4.1 oz (86.3 kg)   SpO2 95%   BMI 32.66 kg/m²     GENERAL: No apparent distress.     HEENT: Membranes moist, no oral lesion, PERRL  NECK:  Supple, no lymphadenopaty  LUNGS: Clear b/l, no rales, no dullness  CARDIAC: RRR, no murmur appreciated  ABD:  + BS, soft / NT  EXT:  No rash, no edema, no lesions  NEURO: No focal neurologic findings  PSYCH: Orientation, sensorium, mood normal  LINES:  Peripheral iv    DATA:    Lab Results   Component Value Date    WBC 7.7 10/07/2020    HGB 11.6 (L) 10/07/2020    HCT 34.4 (L) 10/07/2020    MCV 86.3 10/07/2020     10/07/2020     Lab Results   Component Value Date    CREATININE 0.6 10/07/2020    BUN 8 10/07/2020     10/07/2020    K 3.5 10/07/2020     10/07/2020    CO2 26 10/07/2020       Hepatic Function Panel:   Lab Results   Component Value Date    ALKPHOS 78 10/07/2020    ALT 7 10/07/2020    AST 18 10/07/2020    PROT 7.0 10/07/2020    PROT 6.8 03/22/2012    BILITOT <0.2 10/07/2020    BILIDIR <0.2 10/07/2020    IBILI see below 10/07/2020    LABALBU 3.5 10/07/2020       Micro:  None     Imaging:   10/8 Abd / pelvic CT:  Decreased free air throughout the abdomen, secondary to perforated   diverticulitis.  While the focal collection of gas and fluid in the left   hemipelvis appears similar, there is a developing fluid collection anteriorly   in the pelvis in the right adnexal region.  This could represent early abscess     Mild bladder wall thickening, either reactive or due to underlying cystitis.      IMPRESSION:      Patient Active Problem List   Diagnosis    Hypercholesteremia due to HDL > 100    Low testosterone level in female    Elevated C-reactive protein (CRP)    Primary osteoarthritis of first carpometacarpal joint of left hand    Family history of Alzheimer's disease    Eczema    Acquired underactive thyroid    Reactive airway disease    Chronic allergic rhinitis    Eustachian catarrh, bilateral    Allergic sinusitis    Neuritis    Vasomotor rhinitis    Diverticulitis       Hx diverticulitis, asthma, hypothyroid, BMI 32  Sigmoid diverticulitis  Perforated sigmoid colon  Afebrile / normal WBC  PCN allergy - 'hives'    F/u CT with fluid collection    RECOMMENDATIONS:    Cont ceftriaxone / flagyl  Can d/c fluconazole   Review CT - consider drainage fluid collection    If discharge, stepdown to cefuroxime + flagyl or levofloxacin + flagyl    Discussed with pt   Carina Sharma MD

## 2020-10-09 NOTE — CARE COORDINATION
SW placed phone call to patient regarding feelings about discharge. Patient stated that she was in agreement with medical discharge to home. 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 She denies needs at this time and stated that family will transport later today. SW offered self as a resource if she needs anything while she's off work. No further needs noted unless indicated by patient, family and/or medical staff. Respectfully submitted,    WILLY Urena  [facility]   [phone]    Electronically signed by WILLY Bellamy on 10/9/2020 at 10:06 AM

## 2020-10-12 ENCOUNTER — CARE COORDINATION (OUTPATIENT)
Dept: OTHER | Facility: CLINIC | Age: 55
End: 2020-10-12

## 2020-10-12 NOTE — CARE COORDINATION
[facility] Transitions Initial Follow Up Call    Call within 2 business days of discharge: Yes    Patient: Rory Marshall Patient : 1965   MRN: [id number]  Reason for Admission: Diverticulitis  Discharge Date: 10/9/20 RARS: Readmission Risk Score: 12      Last Discharge [facility]       Complaint Diagnosis Description Type Department Provider    10/4/20 Abdominal Pain Diverticulitis of large intestine with perforation without abscess or bleeding ED to Hosp-Admission (Discharged) (ADMITTED) ETELVINA BuenrostroN Gianna Cassidy MD; Marybel Young. .. Challenges to be reviewed by the provider   Additional needs identified to be addressed with provider No  none       Method of communication with provider : none      Fanta reports she is feeling well. She reports \"loose stools\" that are \"manageable\" secondary to her abx. She denies pain at this time. She has her f/u appt scheduled. She denies uncontrolled N/V/ or diarrhea. She denies fever. Meds reviewed. Advance Care Planning:   Does patient have an Advance Directive:  reviewed and current. Was this a readmission? No  Patient stated reason for admission: abdominal pain  Patients top risk factors for readmission: lack of knowledge about disease    Care Transition Nurse (CTN) contacted the patient by telephone to perform post hospital discharge assessment. Verified name and  with patient as identifiers. Provided introduction to self, and explanation of the CTN role. CTN reviewed discharge instructions, medical action plan and red flags with patient who verbalized understanding. Patient given an opportunity to ask questions and does not have any further questions or concerns at this time. Were discharge instructions available to patient? Yes.  Reviewed appropriate site of care based on symptoms and resources available to patient including: PCP, Specialist, Benefits related nurse triage line, Urgent care clinics, When to call 911, MyChart Messaging and Condition related references. The patient agrees to contact the PCP office for questions related to their healthcare. Medication reconciliation was performed with patient, who verbalizes understanding of administration of home medications. Advised obtaining a 90-day supply of all daily and as-needed medications. Covid Risk Education    Patient has following risk factors of: no known risk factors. Education provided regarding infection prevention, and signs and symptoms of COVID-19 and when to seek medical attention with patient who verbalized understanding. Discussed exposure protocols and quarantine From CDC: Are you at higher risk for severe illness?   and given an opportunity for questions and concerns. The patient agrees to contact the COVID-19 hotline [phone] or PCP office for questions related to COVID-19. For more information on steps you can take to protect yourself, see CDC's How to Protect Yourself     Discussed follow-up appointments. If no appointment was previously scheduled, appointment scheduling offered: Yes. Is follow up appointment scheduled within 7 days of discharge? Yes  Non-[facility] follow up appointment(s): n/a    Plan for follow-up call in 10-14 days based on severity of symptoms and risk factors. Plan for next call: follow up appointment-follow up on GI appt and POC  CTN provided contact information for future needs. Non-face-to-face services provided:  Obtained and reviewed discharge summary and/or continuity of care documents  Education of patient/family/caregiver/guardian to support self-management-discussed low fiber diet in detail. Patient denies needs or assistance with self management  Assessment and support for treatment adherence and medication management-Discussed discharge meds in details, med review compleated.  Patient denies question or financial concerns related to her medications    Care Transitions 24 Hour Call    Schedule Follow Up Appointment with PCP: Completed  Do you have any ongoing symptoms?:  No  Do you have a copy of your discharge instructions?:  Yes  Do you have all of your prescriptions and are they filled?:  Yes  Have you been contacted by a [address]?:  No  Have you scheduled your follow up appointment?:  Yes  How are you going to get to your appointment?:  Car - drive self  Were you discharged with any Home Care or Post Acute Services:  No  Care Transitions Interventions  No Identified Needs         Follow Up  Future Appointments   Date Time Provider Vicenta Falk   10/15/2020  9:45 AM Marguerite Juan MD [id number]   10/19/2020  2:30 PM Raymundo Nino MD W ORTHO [facility]   3/16/2021  3:45 PM MD Papa Cantu RN

## 2020-10-15 ENCOUNTER — OFFICE VISIT (OUTPATIENT)
Dept: SURGERY | Age: 55
End: 2020-10-15

## 2020-10-15 VITALS
DIASTOLIC BLOOD PRESSURE: 90 MMHG | WEIGHT: 173 LBS | SYSTOLIC BLOOD PRESSURE: 116 MMHG | HEART RATE: 106 BPM | BODY MASS INDEX: 29.7 KG/M2

## 2020-10-15 PROCEDURE — 99999 PR OFFICE/OUTPT VISIT,PROCEDURE ONLY: CPT | Performed by: SURGERY

## 2020-10-15 NOTE — PATIENT INSTRUCTIONS
Surgeon: Tiffanie Green MD     Your surgery will be at [facility] [place]  First floor of the [address] [facility]. [place] [address]    Date:    Arrival time:    Surgery time:       Nothing to eat or drink after midnight the night before. COVID-19 testing protocol  Patient needs testing 7 days in advance of procedure. Date: _________ Time: _________    [facility] [address] [facility], [place]  \"Drive thru testing\"  Ph. [phone]       Monday - Friday Tony Thompson 32 may be asked to stop blood thinners 5 days prior to surgery such as Coumadin, Plavix, Fragmin, Lovenox, etc., or any anti-inflammatories such as:  Aspirin, Ibuprofen, Advil, Naproxen prior to your surgery. We also ask that you stop any OTC medications such as fish oil, vitamin E, glucosamine, garlic, Multivitamins, ect. Your time and instructions will be given to you by the surgery scheduler, Ericka Busch. She will call you as well as send you a letter with all the details regarding your surgery. Please make a History and Physical (surgery clearance) by your primary care physician prior to your surgery date. * within 30 days of surgery*     Please contact Micaela / Ike you need to reschedule your surgery or have any questions.   Office phone number:     [phone]  Fax number for [facility]:    [phone]

## 2020-10-19 ENCOUNTER — OFFICE VISIT (OUTPATIENT)
Dept: ORTHOPEDIC SURGERY | Age: 55
End: 2020-10-19
Payer: COMMERCIAL

## 2020-10-19 VITALS — RESPIRATION RATE: 16 BRPM | BODY MASS INDEX: 29.53 KG/M2 | WEIGHT: 173 LBS | TEMPERATURE: 97.3 F | HEIGHT: 64 IN

## 2020-10-19 PROBLEM — M65.331 TRIGGER MIDDLE FINGER OF RIGHT HAND: Status: ACTIVE | Noted: 2020-10-19

## 2020-10-19 PROCEDURE — 20550 NJX 1 TENDON SHEATH/LIGAMENT: CPT | Performed by: PHYSICIAN ASSISTANT

## 2020-10-19 PROCEDURE — 99203 OFFICE O/P NEW LOW 30 MIN: CPT | Performed by: PHYSICIAN ASSISTANT

## 2020-10-19 NOTE — PROGRESS NOTES
Ms. 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    She presents today regarding right symptoms which have been present for approximately 1 months. A history of antecedent trauma or injury is Absent. She reports symptoms to include mild pain and stiffness with occasional popping, catching or locking of the right Middle Finger. Finger symptoms are Frequently worse in the morning or overnight. She reports mild pain located at the base of the symptomatic finger(s). Symptoms are worsening over time. Previous treatment has included conservative measures. She does not claim relation of her symptoms to her required work activities. She has not undergone any form of testing. I have today reviewed with Fanta Caldwell the clinically relevant, past medical history, medications, allergies,  family history, social history, and Review Of Systems & I have documented any details relevant to today's presenting complaints in my history above. Ms. Viktoria Rinne self-reported past medical history, medications, allergies,  family history, social history, and Review Of Systems have been scanned into the chart under the \"Media\" tab. Physical Exam:  Ms. Viktoria Rinne most recent vitals:  Vitals  Temp: 97.3 °F (36.3 °C)  Temp Source: Infrared  Resp: 16  Height: 5' 4\" (162.6 cm)  Weight: 173 lb (78.5 kg)    She is well nourished, oriented to person, place & time. She demonstrates appropriate mood and affect as well as normal gait and station. Skin: Normal in appearance, Normal Color and Free of Lesions Bilaterally   Digital range of motion is Full and equal bilaterally bilaterally. Inducible triggering is noted upon flexion in the right Middle Finger. There is not an associated flexion contracture of the PIP joint. No other digit demonstrates evidence for stenosing tenosynovitis bilaterally.   Wrist range of motion is Full and equal bilaterally bilateRightally. Sensation is normal in the Whole Hand bilaterally  Vascular examination reveals normal, good capillary refill and good color bilaterally  Swelling is mild in the symptomatic digit, absent elsewhere bilaterally  There is no evidence of gross joint instability bilaterally. Muscular strength is clinically appropriate bilaterally. Examination for Stenosing Tenosynovitis demonstrates mild tenderness, thickening & nodularity at the A-1 pulley(s) of the Right Middle Finger. There is a palpable Nota's Node. There is Inducible triggering on active flexion with pain. No other digits demonstrate evidence of Stenosing Tenosynovitis. Examination of the first dorsal extensor compartments of the wrist demonstrates no thickening or fullness. There is no tenderness to palpation over the extensor tendons. Pain is not elicited with resisted thumb extension, and Finklestein's maneuver is negative bilaterally. Impression:  Ms. Clotilde Thakkar is showing clinical evidence of Stenosing Tenosynovitis (Trigger Finger) and presents requesting further treatment. Plan:  I have had a thorough discussion with Ms. Fanta Caldwell regarding the treatment options available for her initially presenting Right Middle Finger stenosing tenosynovitis, which is causing her functional limitations. I have outlined for Ms. Fanta Caldwell the benefits and consequences of the various treatment modalities, including a reasonable expectation for the long term success and the likelihood that further more aggressive treatment may be required for her current presenting condition. Based upon our current discussion and a reasonable understating of the options available to her, Ms. Clotilde Thakkar has selected to proceed with  an injection to the right Middle Finger Flexor Tendon Sheath. I have outlined for her the nature of the injection, and the pre, amanda and post injection considerations and the appropriate expectations for this injection.  I have clearly explained to her that the above outlined treatment plan should not be expected to 'cure' her stenosing tenosynovitis, but we are rather treating the symptoms with which she presents. She has understood that in order to achieve long lasting relief of her symptoms and to prevent future worsening or further damage, that definitive surgical treatment would be required. Ms. Emi Morales  voiced an appropriate understanding of our discussion, the options available to her, and of the expectations of her selected  treatment. She did wish to proceed with Right Middle Finger Flexor Tendon Sheath injection. Procedure:  right Middle Finger Trigger Finger Injection  [first Injection]: After full discussion of the nature of this process and outlining a treatment plan with Ms. Fanta Caldwell, we discussed the complications, limitations, expectations, alternatives, and risks of injection of the flexor tendon sheath. She understood this information well and verbally consented to this treatment. The skin of the symptomatic digit was prepped with Isopropyl Alcohol and under aseptic conditions the flexor tendon sheath was injected with a combination of 1/2 ml of 0.25% Bupivacaine without Epinephrine and 20 mg of Triamcinolone (40 mg/ml). Good filling of the flexor sheath was noted. A dry sterile bandage was applied and the patient tolerated the injection without difficulty. I advised the patient of the expected response, possible reactions and the instructions for care of the hand.       /I have also discussed with Ms. Fanta Caldwell the other treatment options available to her for this condition. We have today selected to proceed with treatment by injection with steroid medication.   She and I have agreed that if our current course of Injection treatment does not prove to be effective over the short term future, that she will schedule a follow-up appointment to discuss and select an alternate course of therapy including possibly further conservative treatment or surgical treatment. Ms. Russell Ricketts has been given a full verbal list of instructions and precautions related to her present condition. I have asked her to followup with me in the office at the prescribed time. She is also specifically requested to call or return to the office sooner if her symptoms change or worsen prior to the next scheduled appointment.

## 2020-10-19 NOTE — PROGRESS NOTES
Subjective:      Patient ID: Na Stephens is a 54 y.o. female. HPI  Doing well from recent admission for diverticulitis. Multiple prior episodes and now ready to move ahead with sigmoidectomy. The risks, benefits and alternatives to the planned procedure were discussed. Patient expressed an understanding and is willing to proceed. Plan for mid November unless symptoms return. Review of Systems    Objective:   Physical Exam    Assessment:       Diagnosis Orders   1. Diverticulitis             Plan:      Sigmoid colectomy    The risks, benefits and alternatives to the planned procedure were discussed. Patient expressed an understanding and is willing to proceed.           Bennie Royal MD

## 2020-10-21 RX ORDER — LIOTHYRONINE SODIUM 5 UG/1
TABLET ORAL
Qty: 180 TABLET | Refills: 1 | Status: SHIPPED | OUTPATIENT
Start: 2020-10-21 | End: 2021-05-03

## 2020-10-22 ENCOUNTER — TELEPHONE (OUTPATIENT)
Dept: SURGERY | Age: 55
End: 2020-10-22

## 2020-10-22 ENCOUNTER — OFFICE VISIT (OUTPATIENT)
Dept: FAMILY MEDICINE CLINIC | Age: 55
End: 2020-10-22
Payer: COMMERCIAL

## 2020-10-22 VITALS
HEIGHT: 64 IN | WEIGHT: 176 LBS | BODY MASS INDEX: 30.05 KG/M2 | DIASTOLIC BLOOD PRESSURE: 70 MMHG | SYSTOLIC BLOOD PRESSURE: 104 MMHG | TEMPERATURE: 97.2 F | HEART RATE: 91 BPM | OXYGEN SATURATION: 97 %

## 2020-10-22 PROCEDURE — 1111F DSCHRG MED/CURRENT MED MERGE: CPT | Performed by: NURSE PRACTITIONER

## 2020-10-22 PROCEDURE — 93000 ELECTROCARDIOGRAM COMPLETE: CPT | Performed by: NURSE PRACTITIONER

## 2020-10-22 PROCEDURE — 99214 OFFICE O/P EST MOD 30 MIN: CPT | Performed by: NURSE PRACTITIONER

## 2020-10-22 ASSESSMENT — ENCOUNTER SYMPTOMS
BLOOD IN STOOL: 0
CONSTIPATION: 0
SHORTNESS OF BREATH: 0
ABDOMINAL PAIN: 0
CHEST TIGHTNESS: 0
VOMITING: 0
COUGH: 0
NAUSEA: 0
DIARRHEA: 0

## 2020-10-22 NOTE — PROGRESS NOTES
Post-Discharge Transitional Care Management Services or Hospital Follow Up      [address] [place]   YOB: 1965    Date of Office Visit:  10/22/2020  Date of Hospital Admission: 10/4/20  Date of Hospital Discharge: 10/9/20  Readmission Risk Score(high >=14%. Medium >=10%):Readmission Risk Score: 12      Care management risk score Rising risk (score 2-5) and Complex Care (Scores >=6): 2     Non face to face  following discharge, date last encounter closed (first attempt may have been earlier): 10/12/2020  2:54 PM 10/12/2020  2:54 PM    Call initiated 2 business days of discharge: Yes     Patient Active Problem List   Diagnosis    Hypercholesteremia due to HDL > 100    Low testosterone level in female    Elevated C-reactive protein (CRP)    Primary osteoarthritis of first carpometacarpal joint of left hand    Family history of Alzheimer's disease    Eczema    Acquired underactive thyroid    Reactive airway disease    Chronic allergic rhinitis    Eustachian catarrh, bilateral    Allergic sinusitis    Neuritis    Vasomotor rhinitis    Diverticulitis    Trigger middle finger of right hand       Allergies   Allergen Reactions    Other Itching     thermersol-preservative for contact solution-pt states got itchy eyes    Penicillins Hives    Nickel Rash       Medications listed as ordered at the time of discharge from hospital   Fanta Caldwell   Home Medication Instructions YUE:    Printed on:10/22/20 1410   Medication Information                      Ascorbic Acid (VITAMIN C) 500 MG tablet  Take 500 mg by mouth daily. buPROPion (WELLBUTRIN XL) 150 MG extended release tablet  TAKE ONE TABLET BY MOUTH EVERY MORNING             fluocinonide (LIDEX) 0.05 % ointment  Apply sparingly to affected area(s) bid prn for flares, up to 2 weeks at a time.  Do not apply on cleared skin.             gabapentin (NEURONTIN) 100 MG capsule  TAKE 1 CAPSULE BY MOUTH EVERY MORNING AND TAKE 4 CAPSULES BY MOUTH EVERY EVENING             ipratropium (ATROVENT) 0.03 % nasal spray  INSTILL 2 SPRAYS IN EACH NOSTRIL EVERY 12 HOURS             levocetirizine (XYZAL) 5 MG tablet  TAKE ONE TABLET BY MOUTH NIGHTLY             levoFLOXacin (LEVAQUIN) 500 MG tablet  Take 1 tablet by mouth daily for 14 days             liothyronine (CYTOMEL) 5 MCG tablet  TAKE TWO TABLETS BY MOUTH EVERY DAY             loratadine (CLARITIN) 10 MG tablet  Take 10 mg by mouth daily as needed              magnesium 30 MG tablet  Take 400 mg by mouth daily              metroNIDAZOLE (FLAGYL) 500 MG tablet  Take 1 tablet by mouth every 8 hours for 14 days             Multiple Vitamin (MULTIVITAMINS PO)  Take 1 tablet by mouth daily              Omega-3 Fatty Acids (FISH OIL) 1000 MG CAPS  Take 1,000 mg by mouth daily             scopolamine (TRANSDERM-SCOP, 1.5 MG,) transdermal patch  Place 1 patch onto the skin every 72 hours Apply to behind ear starting 4 hours before travel. zinc gluconate 50 MG tablet  Take 50 mg by mouth daily.                    Medications marked \"taking\" at this time  Outpatient Medications Marked as Taking for the 10/22/20 encounter (Office Visit) with NATALYA Barrera CNP   Medication Sig Dispense Refill    liothyronine (CYTOMEL) 5 MCG tablet TAKE TWO TABLETS BY MOUTH EVERY  tablet 1    levoFLOXacin (LEVAQUIN) 500 MG tablet Take 1 tablet by mouth daily for 14 days 14 tablet 0    metroNIDAZOLE (FLAGYL) 500 MG tablet Take 1 tablet by mouth every 8 hours for 14 days 42 tablet 0    gabapentin (NEURONTIN) 100 MG capsule TAKE 1 CAPSULE BY MOUTH EVERY MORNING AND TAKE 4 CAPSULES BY MOUTH EVERY EVENING (Patient taking differently: TAKE 4 CAPSULES BY MOUTH EVERY EVENING) 450 capsule 0    buPROPion (WELLBUTRIN XL) 150 MG extended release tablet TAKE ONE TABLET BY MOUTH EVERY MORNING 90 tablet 0    ipratropium (ATROVENT) 0.03 % nasal spray INSTILL 2 SPRAYS IN EACH NOSTRIL EVERY 12 HOURS 30 mL 3    fluocinonide (LIDEX) 0.05 % ointment Apply sparingly to affected area(s) bid prn for flares, up to 2 weeks at a time. Do not apply on cleared skin. 30 g 1    levocetirizine (XYZAL) 5 MG tablet TAKE ONE TABLET BY MOUTH NIGHTLY 90 tablet 3    Omega-3 Fatty Acids (FISH OIL) 1000 MG CAPS Take 1,000 mg by mouth daily      loratadine (CLARITIN) 10 MG tablet Take 10 mg by mouth daily as needed       Multiple Vitamin (MULTIVITAMINS PO) Take 1 tablet by mouth daily       Ascorbic Acid (VITAMIN C) 500 MG tablet Take 500 mg by mouth daily.  magnesium 30 MG tablet Take 400 mg by mouth daily       zinc gluconate 50 MG tablet Take 50 mg by mouth daily. Medications patient taking as of now reconciled against medications ordered at time of hospital discharge: Yes    Chief Complaint   Patient presents with    [facility] from [facility]     10/4/20 to 10/9/20 [facility] - perforated colon       HPI  Patient reports that she started with LLQ abd pain on 9/30/20. She has history of diverticulitis, but will occasionally get LLQ pain and would typically resolve after a couple of days. She increased fiber and water intake. She went home early from work on Thursday. Denies fever. Stayed in bed till Friday evening. Sat morning felt better. Sat night had increased constipation and then developed severe LLQ abd pain. Pain continued to progress and went to ER on 10/4/20. Found to have acute perforated sigmoid diverticulitis. Treated in hospital with IV rocephin and flagyl. Sent home with PO antibiotic- levaquin and flagyl and will be finished tomorrow. Tolerating medications well. Seen general surgery Dr. Jac Lai. Planning on having sigmoid colectomy on 11/18/20. Taking colace daily to keep stools soft. Does have history of constipation. Will take magnesium and fiber supplement daily to help prevent constipation. Not currently on the fiber due to recent diverticulitis. GI Dr. Pippa Smith- last colonoscopy was 4/2015.  Plan was to recheck in 10 years. Inpatient course: Discharge summary reviewed- see chart. Review of Systems   Constitutional: Negative for activity change, fatigue and fever. Respiratory: Negative for cough, chest tightness and shortness of breath. Cardiovascular: Negative for chest pain. Gastrointestinal: Negative for abdominal pain, blood in stool, constipation, diarrhea, nausea and vomiting. Genitourinary: Negative for difficulty urinating, dysuria, frequency, hematuria and pelvic pain. Neurological: Negative for dizziness, syncope, weakness and headaches. Psychiatric/Behavioral: Negative for confusion. Vitals:    10/22/20 1405   BP: 104/70   Pulse: 91   Temp: 97.2 °F (36.2 °C)   TempSrc: Temporal   SpO2: 97%   Weight: 176 lb (79.8 kg)   Height: 5' 4\" (1.626 m)     Body mass index is 30.21 kg/m². Wt Readings from Last 3 Encounters:   10/22/20 176 lb (79.8 kg)   10/19/20 173 lb (78.5 kg)   10/15/20 173 lb (78.5 kg)     BP Readings from Last 3 Encounters:   10/22/20 104/70   10/15/20 (!) 116/90   10/09/20 (!) 132/90       Physical Exam  Vitals signs and nursing note reviewed. Constitutional:       General: She is not in acute distress. Appearance: She is well-developed. HENT:      Head: Normocephalic and atraumatic. Neck:      Musculoskeletal: Neck supple. Cardiovascular:      Rate and Rhythm: Normal rate and regular rhythm. Heart sounds: Normal heart sounds. No murmur. No friction rub. No gallop. Pulmonary:      Effort: Pulmonary effort is normal. No respiratory distress. Breath sounds: Normal breath sounds. Abdominal:      General: Bowel sounds are normal.      Palpations: Abdomen is soft. Tenderness: There is no abdominal tenderness. There is no right CVA tenderness or left CVA tenderness. Musculoskeletal:      Right lower leg: No edema. Left lower leg: No edema. Skin:     General: Skin is warm and dry.    Neurological:      Mental Status: She is alert and oriented to person, place, and time. Psychiatric:         Behavior: Behavior normal.         Thought Content: Thought content normal.         Judgment: Judgment normal.             Assessment/Plan:  1. Perforation of sigmoid colon due to diverticulitis  Treated in hospital with IV rocephin and flagyl. Discharged home on levaquin and flagyl. Finishing antibiotics tomorrow. Doing well. Had f/u with Dr. Daryl Durant and planning on sigmoid colectomy next month. 2. Hypokalemia  Noted on labs in hospital. Will repeat lab. - Basic Metabolic Panel; Future    3. Pre-op examination  - EKG 12 Lead- NSR- stable compared to previous tracing 5/13/16. Patient is stable for planned surgery of sigmoid colectomy with Dr. Daryl Durant on 11/18/20. Patient will receive her flu vaccine at work next week. F/u with PCP Dr. Norma Stephens in 3 months for chronic condition f/u.

## 2020-10-22 NOTE — LETTER
Surgery Scheduling Form:      DEMOGRAPHICS:                                                                                                         .    Patient Name:  Buck López  Patient :  1965   Patient SS#:      Patient Phone:  [phone] (home) [phone] (work) Alt. Patient Phone:                     Patient Address:  [facility] [address] [facility] [address]    PCP:  Steven Ann MD  Insurance:  Payor: [employer] / Plan: [employer] [facility] [address] [id number] / Product Type: *No Product type* / Insurance ID Number:    Payor/Plan Subscr  Sex Relation Sub. Ins. ID Effective Group Num   1.  [address]ICAL [id number] 1965 Female  [id number]                                   [address]       DIAGNOSIS & PROCEDURE:                                                                                       .    Diagnosis:  K57.92 -  Diverticulitis   Operation:  Sigmoid Colectomy  Location:  [facility]  Surgeon: Les Jacobsen MD    [facility] INFORMATION:                                                                                    .    Surgeon's Scheduling Instruction:  elective  Requested Date: 20   OR Time: 12:00          Patient Arrival Time: 10:30  OR Time Required:  120  Minutes  Anesthesia:  General  Equipment:                                                           SA Required:  Yes  Status:  Same day admit       Standard C-Arm:  No  PAT Required:  Covid                            Best Time to Call:  anytime  Patient Requested to see PCP for Pre-op H & P:  Dr Marcos Cm will do H & P  Special Comments:  PT NOTIFIED Lynn Arteaga MD     02:19  PRE-CERTIFICATION INFORMATION:                                                                           .    Procedure:       CPT Code Modifier          73186

## 2020-10-23 DIAGNOSIS — E87.6 HYPOKALEMIA: ICD-10-CM

## 2020-10-23 LAB
ANION GAP SERPL CALCULATED.3IONS-SCNC: 10 MMOL/L (ref 3–16)
BUN BLDV-MCNC: 13 MG/DL (ref 7–20)
CALCIUM SERPL-MCNC: 9.7 MG/DL (ref 8.3–10.6)
CHLORIDE BLD-SCNC: 102 MMOL/L (ref 99–110)
CO2: 27 MMOL/L (ref 21–32)
CREAT SERPL-MCNC: 0.6 MG/DL (ref 0.6–1.1)
GFR AFRICAN AMERICAN: >60
GFR NON-AFRICAN AMERICAN: >60
GLUCOSE BLD-MCNC: 105 MG/DL (ref 70–99)
POTASSIUM SERPL-SCNC: 4.6 MMOL/L (ref 3.5–5.1)
SODIUM BLD-SCNC: 139 MMOL/L (ref 136–145)

## 2020-10-26 ENCOUNTER — CARE COORDINATION (OUTPATIENT)
Dept: OTHER | Facility: CLINIC | Age: 55
End: 2020-10-26

## 2020-10-26 NOTE — CARE COORDINATION
concerns related to medications    Discussed follow-up appointments. If no appointment was previously scheduled, appointment scheduling offered: n/a Is follow up appointment scheduled within 7 days of discharge? Yes  Non-[facility] follow up appointment(s): n/a    No further outreaches based on severity of symptoms and risk factors. No further outreach scheduled with this ACM, ACM will sign off care team at this time. Episode of Care resolved. Patient has this CTN/ACM contact information if future needs arise. Care Transitions Subsequent and Final Call    Subsequent and Final Calls  Do you have any ongoing symptoms?:  No  Have your medications changed?:  No  Do you have any questions related to your medications?:  No  Do you currently have any active services?:  No  Do you have any needs or concerns that I can assist you with?:  No  Identified Barriers:  None  Care Transitions Interventions  No Identified Needs  Other Interventions:             Follow Up  Future Appointments   Date Time Provider Vicenta Falk   11/13/2020 12:30 PM Gigi Torres [facility]   11/18/2020 12:05 PM Paul Carmichael MD [id number]   3/16/2021  3:45 PM MD Keith Almanzar [facility]       Raina Brenner, EDUARDO

## 2020-11-03 ENCOUNTER — TELEPHONE (OUTPATIENT)
Dept: SURGERY | Age: 55
End: 2020-11-03

## 2020-11-13 ENCOUNTER — OFFICE VISIT (OUTPATIENT)
Dept: PRIMARY CARE CLINIC | Age: 55
End: 2020-11-13
Payer: COMMERCIAL

## 2020-11-13 PROCEDURE — 99211 OFF/OP EST MAY X REQ PHY/QHP: CPT | Performed by: NURSE PRACTITIONER

## 2020-11-16 ENCOUNTER — ANESTHESIA EVENT (OUTPATIENT)
Dept: OPERATING ROOM | Age: 55
DRG: 330 | End: 2020-11-16
Payer: COMMERCIAL

## 2020-11-16 LAB — SARS-COV-2: NOT DETECTED

## 2020-11-17 RX ORDER — GUAIFENESIN AND DEXTROMETHORPHAN HYDROBROMIDE 1200; 60 MG/1; MG/1
1 TABLET, EXTENDED RELEASE ORAL EVERY 12 HOURS PRN
COMMUNITY
End: 2021-05-24

## 2020-11-17 NOTE — PROGRESS NOTES
Preoperative Screening for Elective Surgery/Invasive Procedures While COVID-19 present in the community     Have you tested positive or have been told to self-isolate for COVID-19 like symptoms within the past 28 days? no   Do you currently have any of the following symptoms? o Fever >100.0 F or 99.9 F in immunocompromised patients? no  o New onset cough, shortness of breath or difficulty breathing? no  o New onset sore throat, myalgia (muscle aches and pains), headache, loss of taste/smell or diarrhea? no   Have you had a potential exposure to COVID-19 within the past 14 days by:  o Close contact with a confirmed case? no  o Close contact with a healthcare worker,  or essential infrastructure worker (grocery store, [employer], gas station, public utilities or transportation)? Yes-pcp  o Do you reside in a congregate setting such as; skilled nursing facility, adult home, correctional facility, homeless shelter or other institutional setting? 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 no    Indicate if the patient has a positive screen by answering yes to one or more of the above questions. Patients who test positive or screen positive prior to surgery or on the day of surgery should be evaluated in conjunction with the surgeon/proceduralist/anesthesiologist to determine the urgency of the procedure.

## 2020-11-17 NOTE — PROGRESS NOTES
C-Difficile admission screening and protocol:     * Admitted with diarrhea?no     *Prior history of C-Diff. In last 3 months? no     *Antibiotic use in the past 6-8 weeks? yes     *Prior hospitalization or nursing home in the last month?     No-last 10/4-10/9

## 2020-11-17 NOTE — PROGRESS NOTES
[address] [facility] time__0845__________        Surgery time____1020________    Take the following medications with a sip of water: Follow your MD/Surgeons pre-procedure instructions regarding your medications    Do not eat or drink anything after 12:00 midnight prior to your surgery. This includes water chewing gum, mints and ice chips. You may brush your teeth and gargle the morning of your surgery, but do not swallow the water     Please see your family doctor/pediatrician for a history and physical and/or concerning medications. Bring any test results/reports from your physicians office. If you are under the care of a heart doctor or specialist doctor, please be aware that you may be asked to them for clearance    You may be asked to stop blood thinners such as Coumadin, Plavix, Fragmin, Lovenox, etc., or any anti-inflammatories such as:  Aspirin, Ibuprofen, Advil, Naproxen prior to your surgery. We also ask that you stop any OTC medications such as fish oil, vitamin E, glucosamine, garlic, Multivitamins, COQ 10, etc. May take tylenol    We ask that you do not smoke 24 hours prior to surgery  We ask that you do not  drink any alcoholic beverages 24 hours prior to surgery     You must make arrangements for a responsible adult to take you home after your surgery. For your safety you will not be allowed to leave alone or drive yourself home. Your surgery will be cancelled if you do not have a ride home. Also for your safety, it is strongly suggested that someone stay with you the first 24 hours after your surgery. A parent or legal guardian must accompany a child scheduled for surgery and plan to stay at the hospital until the child is discharged. Please do not bring other children with you. For your comfort, please wear simple loose fitting clothing to the hospital.  Please do not bring valuables.     Do not wear any make-up or nail polish on your fingers or toes      For your safety, please do not wear any jewelry or body piercing's on the day of surgery. All jewelry must be removed. If you have dentures, they will be removed before going to operating room. For your convenience, we will provide you with a container. If you wear contact lenses or glasses, they will be removed, please bring a case for them. If you have a living will and a durable power of  for healthcare, please bring in a copy. As part of our patient safety program to minimize surgical site infections, we ask you to do the following:    · Please notify your surgeon if you develop any illness between         now and the  day of your surgery. · This includes a cough, cold, fever, sore throat, nausea,         or vomiting, and diarrhea, etc.  ·  Please notify your surgeon if you experience dizziness, shortness         of breath or blurred vision between now and the time of your surgery. Do not shave your operative site 96 hours prior to surgery. For face and neck surgery, men may use an electric razor 48 hours   prior to surgery. You may shower the night before surgery or the morning of   your surgery with an antibacterial soap. You will need to bring a photo ID and insurance card    [facility] has an onsite pharmacy, would you like to utilize our pharmacy     If you will be staying overnight and use a C-pap machine, please bring   your C-pap to hospital     Our goal is to provide you with excellent care, therefore, visitors will be limited to two(2) in the room at a time so that we may focus on providing this care for you. Please contact pre-admission testing if you have any further questions. [facility] phone number:  [phone] PAT fax number:  [phone]  Please note these are generalized instructions for all surgical cases, you may be provided with more specific instructions according to your surgery.

## 2020-11-18 ENCOUNTER — ANESTHESIA (OUTPATIENT)
Dept: OPERATING ROOM | Age: 55
DRG: 330 | End: 2020-11-18
Payer: COMMERCIAL

## 2020-11-18 ENCOUNTER — HOSPITAL ENCOUNTER (INPATIENT)
Age: 55
LOS: 3 days | Discharge: HOME | DRG: 330 | End: 2020-11-21
Attending: SURGERY | Admitting: SURGERY
Payer: COMMERCIAL

## 2020-11-18 VITALS
TEMPERATURE: 96.4 F | OXYGEN SATURATION: 100 % | RESPIRATION RATE: 14 BRPM | DIASTOLIC BLOOD PRESSURE: 72 MMHG | SYSTOLIC BLOOD PRESSURE: 123 MMHG

## 2020-11-18 DIAGNOSIS — K57.32 SIGMOID DIVERTICULITIS: Primary | ICD-10-CM

## 2020-11-18 DIAGNOSIS — K57.92 DIVERTICULITIS: ICD-10-CM

## 2020-11-18 LAB
ABO/RH: NORMAL
ANTIBODY SCREEN: NORMAL

## 2020-11-18 PROCEDURE — 2580000003 HC RX 258: Performed by: SURGERY

## 2020-11-18 PROCEDURE — 0DTN0ZZ RESECTION OF SIGMOID COLON, OPEN APPROACH: ICD-10-PCS | Performed by: SURGERY

## 2020-11-18 PROCEDURE — 2580000003 HC RX 258: Performed by: ANESTHESIOLOGY

## 2020-11-18 PROCEDURE — 86901 BLOOD TYPING SEROLOGIC RH(D): CPT

## 2020-11-18 PROCEDURE — 2500000003 HC RX 250 WO HCPCS: Performed by: SURGERY

## 2020-11-18 PROCEDURE — 6360000002 HC RX W HCPCS: Performed by: ANESTHESIOLOGY

## 2020-11-18 PROCEDURE — 6370000000 HC RX 637 (ALT 250 FOR IP): Performed by: SURGERY

## 2020-11-18 PROCEDURE — 2709999900 HC NON-CHARGEABLE SUPPLY: Performed by: SURGERY

## 2020-11-18 PROCEDURE — 6360000002 HC RX W HCPCS: Performed by: SURGERY

## 2020-11-18 PROCEDURE — 2720000010 HC SURG SUPPLY STERILE: Performed by: SURGERY

## 2020-11-18 PROCEDURE — 86900 BLOOD TYPING SEROLOGIC ABO: CPT

## 2020-11-18 PROCEDURE — 6360000002 HC RX W HCPCS: Performed by: NURSE ANESTHETIST, CERTIFIED REGISTERED

## 2020-11-18 PROCEDURE — 86850 RBC ANTIBODY SCREEN: CPT

## 2020-11-18 PROCEDURE — 2700000000 HC OXYGEN THERAPY PER DAY

## 2020-11-18 PROCEDURE — 3600000014 HC SURGERY LEVEL 4 ADDTL 15MIN: Performed by: SURGERY

## 2020-11-18 PROCEDURE — 3600000004 HC SURGERY LEVEL 4 BASE: Performed by: SURGERY

## 2020-11-18 PROCEDURE — 3700000000 HC ANESTHESIA ATTENDED CARE: Performed by: SURGERY

## 2020-11-18 PROCEDURE — 7100000000 HC PACU RECOVERY - FIRST 15 MIN: Performed by: SURGERY

## 2020-11-18 PROCEDURE — 7100000001 HC PACU RECOVERY - ADDTL 15 MIN: Performed by: SURGERY

## 2020-11-18 PROCEDURE — 1200000000 HC SEMI PRIVATE

## 2020-11-18 PROCEDURE — 3700000001 HC ADD 15 MINUTES (ANESTHESIA): Performed by: SURGERY

## 2020-11-18 PROCEDURE — 88307 TISSUE EXAM BY PATHOLOGIST: CPT

## 2020-11-18 PROCEDURE — 94770 HC ETCO2 MONITOR DAILY: CPT

## 2020-11-18 PROCEDURE — 44145 PARTIAL REMOVAL OF COLON: CPT | Performed by: SURGERY

## 2020-11-18 PROCEDURE — 2500000003 HC RX 250 WO HCPCS: Performed by: NURSE ANESTHETIST, CERTIFIED REGISTERED

## 2020-11-18 PROCEDURE — 94761 N-INVAS EAR/PLS OXIMETRY MLT: CPT

## 2020-11-18 RX ORDER — DEXAMETHASONE SODIUM PHOSPHATE 4 MG/ML
INJECTION, SOLUTION INTRA-ARTICULAR; INTRALESIONAL; INTRAMUSCULAR; INTRAVENOUS; SOFT TISSUE PRN
Status: DISCONTINUED | OUTPATIENT
Start: 2020-11-18 | End: 2020-11-18 | Stop reason: SDUPTHER

## 2020-11-18 RX ORDER — MORPHINE SULFATE/0.9% NACL/PF 1 MG/ML
SYRINGE (ML) INJECTION CONTINUOUS
Status: DISCONTINUED | OUTPATIENT
Start: 2020-11-18 | End: 2020-11-19

## 2020-11-18 RX ORDER — SODIUM CHLORIDE 9 MG/ML
INJECTION, SOLUTION INTRAVENOUS CONTINUOUS
Status: DISCONTINUED | OUTPATIENT
Start: 2020-11-18 | End: 2020-11-20

## 2020-11-18 RX ORDER — ONDANSETRON 2 MG/ML
INJECTION INTRAMUSCULAR; INTRAVENOUS PRN
Status: DISCONTINUED | OUTPATIENT
Start: 2020-11-18 | End: 2020-11-18 | Stop reason: SDUPTHER

## 2020-11-18 RX ORDER — SODIUM CHLORIDE 0.9 % (FLUSH) 0.9 %
10 SYRINGE (ML) INJECTION EVERY 12 HOURS SCHEDULED
Status: DISCONTINUED | OUTPATIENT
Start: 2020-11-18 | End: 2020-11-18 | Stop reason: HOSPADM

## 2020-11-18 RX ORDER — MORPHINE SULFATE 2 MG/ML
1 INJECTION, SOLUTION INTRAMUSCULAR; INTRAVENOUS EVERY 5 MIN PRN
Status: DISCONTINUED | OUTPATIENT
Start: 2020-11-18 | End: 2020-11-18 | Stop reason: HOSPADM

## 2020-11-18 RX ORDER — IPRATROPIUM BROMIDE 21 UG/1
2 SPRAY, METERED NASAL 2 TIMES DAILY
Status: DISCONTINUED | OUTPATIENT
Start: 2020-11-18 | End: 2020-11-21 | Stop reason: HOSPADM

## 2020-11-18 RX ORDER — FENTANYL CITRATE 50 UG/ML
25 INJECTION, SOLUTION INTRAMUSCULAR; INTRAVENOUS EVERY 5 MIN PRN
Status: DISCONTINUED | OUTPATIENT
Start: 2020-11-18 | End: 2020-11-18 | Stop reason: HOSPADM

## 2020-11-18 RX ORDER — ROCURONIUM BROMIDE 10 MG/ML
INJECTION, SOLUTION INTRAVENOUS PRN
Status: DISCONTINUED | OUTPATIENT
Start: 2020-11-18 | End: 2020-11-18 | Stop reason: SDUPTHER

## 2020-11-18 RX ORDER — CETIRIZINE HYDROCHLORIDE 10 MG/1
5 TABLET ORAL DAILY
Status: DISCONTINUED | OUTPATIENT
Start: 2020-11-18 | End: 2020-11-21 | Stop reason: HOSPADM

## 2020-11-18 RX ORDER — KETOROLAC TROMETHAMINE 15 MG/ML
15 INJECTION, SOLUTION INTRAMUSCULAR; INTRAVENOUS EVERY 6 HOURS
Status: COMPLETED | OUTPATIENT
Start: 2020-11-18 | End: 2020-11-21

## 2020-11-18 RX ORDER — ONDANSETRON 2 MG/ML
4 INJECTION INTRAMUSCULAR; INTRAVENOUS
Status: DISCONTINUED | OUTPATIENT
Start: 2020-11-18 | End: 2020-11-18 | Stop reason: HOSPADM

## 2020-11-18 RX ORDER — MIDAZOLAM HYDROCHLORIDE 1 MG/ML
INJECTION INTRAMUSCULAR; INTRAVENOUS PRN
Status: DISCONTINUED | OUTPATIENT
Start: 2020-11-18 | End: 2020-11-18 | Stop reason: SDUPTHER

## 2020-11-18 RX ORDER — PROMETHAZINE HYDROCHLORIDE 25 MG/ML
6.25 INJECTION, SOLUTION INTRAMUSCULAR; INTRAVENOUS ONCE
Status: COMPLETED | OUTPATIENT
Start: 2020-11-18 | End: 2020-11-18

## 2020-11-18 RX ORDER — POLYETHYLENE GLYCOL 3350 17 G/17G
17 POWDER, FOR SOLUTION ORAL DAILY PRN
Status: DISCONTINUED | OUTPATIENT
Start: 2020-11-18 | End: 2020-11-21 | Stop reason: HOSPADM

## 2020-11-18 RX ORDER — ONDANSETRON 2 MG/ML
4 INJECTION INTRAMUSCULAR; INTRAVENOUS EVERY 6 HOURS PRN
Status: DISCONTINUED | OUTPATIENT
Start: 2020-11-18 | End: 2020-11-21 | Stop reason: HOSPADM

## 2020-11-18 RX ORDER — PHENYLEPHRINE HCL IN 0.9% NACL 1 MG/10 ML
SYRINGE (ML) INTRAVENOUS PRN
Status: DISCONTINUED | OUTPATIENT
Start: 2020-11-18 | End: 2020-11-18 | Stop reason: SDUPTHER

## 2020-11-18 RX ORDER — LIOTHYRONINE SODIUM 5 UG/1
10 TABLET ORAL DAILY
Status: DISCONTINUED | OUTPATIENT
Start: 2020-11-18 | End: 2020-11-21 | Stop reason: HOSPADM

## 2020-11-18 RX ORDER — OXYCODONE HYDROCHLORIDE 10 MG/1
10 TABLET ORAL PRN
Status: DISCONTINUED | OUTPATIENT
Start: 2020-11-18 | End: 2020-11-18 | Stop reason: HOSPADM

## 2020-11-18 RX ORDER — SODIUM CHLORIDE 0.9 % (FLUSH) 0.9 %
10 SYRINGE (ML) INJECTION EVERY 12 HOURS SCHEDULED
Status: DISCONTINUED | OUTPATIENT
Start: 2020-11-18 | End: 2020-11-21 | Stop reason: HOSPADM

## 2020-11-18 RX ORDER — OXYCODONE HYDROCHLORIDE 5 MG/1
5 TABLET ORAL EVERY 4 HOURS PRN
Status: DISCONTINUED | OUTPATIENT
Start: 2020-11-18 | End: 2020-11-18

## 2020-11-18 RX ORDER — OXYCODONE HYDROCHLORIDE 5 MG/1
5 TABLET ORAL PRN
Status: DISCONTINUED | OUTPATIENT
Start: 2020-11-18 | End: 2020-11-18 | Stop reason: HOSPADM

## 2020-11-18 RX ORDER — PROMETHAZINE HYDROCHLORIDE 25 MG/1
12.5 TABLET ORAL EVERY 6 HOURS PRN
Status: DISCONTINUED | OUTPATIENT
Start: 2020-11-18 | End: 2020-11-21 | Stop reason: HOSPADM

## 2020-11-18 RX ORDER — SODIUM CHLORIDE 0.9 % (FLUSH) 0.9 %
10 SYRINGE (ML) INJECTION PRN
Status: DISCONTINUED | OUTPATIENT
Start: 2020-11-18 | End: 2020-11-18 | Stop reason: HOSPADM

## 2020-11-18 RX ORDER — ACETAMINOPHEN 325 MG/1
650 TABLET ORAL EVERY 6 HOURS PRN
Status: DISCONTINUED | OUTPATIENT
Start: 2020-11-18 | End: 2020-11-21 | Stop reason: HOSPADM

## 2020-11-18 RX ORDER — ACETAMINOPHEN 650 MG/1
650 SUPPOSITORY RECTAL EVERY 6 HOURS PRN
Status: DISCONTINUED | OUTPATIENT
Start: 2020-11-18 | End: 2020-11-21 | Stop reason: HOSPADM

## 2020-11-18 RX ORDER — FENTANYL CITRATE 50 UG/ML
INJECTION, SOLUTION INTRAMUSCULAR; INTRAVENOUS PRN
Status: DISCONTINUED | OUTPATIENT
Start: 2020-11-18 | End: 2020-11-18 | Stop reason: SDUPTHER

## 2020-11-18 RX ORDER — BUPROPION HYDROCHLORIDE 150 MG/1
150 TABLET ORAL DAILY
Status: DISCONTINUED | OUTPATIENT
Start: 2020-11-18 | End: 2020-11-21 | Stop reason: HOSPADM

## 2020-11-18 RX ORDER — GLYCOPYRROLATE 0.2 MG/ML
INJECTION INTRAMUSCULAR; INTRAVENOUS PRN
Status: DISCONTINUED | OUTPATIENT
Start: 2020-11-18 | End: 2020-11-18 | Stop reason: SDUPTHER

## 2020-11-18 RX ORDER — MORPHINE SULFATE 2 MG/ML
2 INJECTION, SOLUTION INTRAMUSCULAR; INTRAVENOUS EVERY 5 MIN PRN
Status: DISCONTINUED | OUTPATIENT
Start: 2020-11-18 | End: 2020-11-18 | Stop reason: HOSPADM

## 2020-11-18 RX ORDER — BUPIVACAINE HYDROCHLORIDE 5 MG/ML
INJECTION, SOLUTION EPIDURAL; INTRACAUDAL
Status: COMPLETED | OUTPATIENT
Start: 2020-11-18 | End: 2020-11-18

## 2020-11-18 RX ORDER — CIPROFLOXACIN 2 MG/ML
400 INJECTION, SOLUTION INTRAVENOUS ONCE
Status: COMPLETED | OUTPATIENT
Start: 2020-11-18 | End: 2020-11-18

## 2020-11-18 RX ORDER — NALOXONE HYDROCHLORIDE 0.4 MG/ML
0.4 INJECTION, SOLUTION INTRAMUSCULAR; INTRAVENOUS; SUBCUTANEOUS PRN
Status: DISCONTINUED | OUTPATIENT
Start: 2020-11-18 | End: 2020-11-19

## 2020-11-18 RX ORDER — HEPARIN SODIUM 5000 [USP'U]/ML
5000 INJECTION, SOLUTION INTRAVENOUS; SUBCUTANEOUS ONCE
Status: COMPLETED | OUTPATIENT
Start: 2020-11-18 | End: 2020-11-18

## 2020-11-18 RX ORDER — PROPOFOL 10 MG/ML
INJECTION, EMULSION INTRAVENOUS PRN
Status: DISCONTINUED | OUTPATIENT
Start: 2020-11-18 | End: 2020-11-18 | Stop reason: SDUPTHER

## 2020-11-18 RX ORDER — FENTANYL CITRATE 50 UG/ML
50 INJECTION, SOLUTION INTRAMUSCULAR; INTRAVENOUS EVERY 5 MIN PRN
Status: DISCONTINUED | OUTPATIENT
Start: 2020-11-18 | End: 2020-11-18 | Stop reason: HOSPADM

## 2020-11-18 RX ORDER — SODIUM CHLORIDE 0.9 % (FLUSH) 0.9 %
10 SYRINGE (ML) INJECTION PRN
Status: DISCONTINUED | OUTPATIENT
Start: 2020-11-18 | End: 2020-11-21 | Stop reason: HOSPADM

## 2020-11-18 RX ORDER — LIDOCAINE HYDROCHLORIDE 20 MG/ML
INJECTION, SOLUTION EPIDURAL; INFILTRATION; INTRACAUDAL; PERINEURAL PRN
Status: DISCONTINUED | OUTPATIENT
Start: 2020-11-18 | End: 2020-11-18 | Stop reason: SDUPTHER

## 2020-11-18 RX ORDER — OXYCODONE HYDROCHLORIDE 10 MG/1
10 TABLET ORAL EVERY 4 HOURS PRN
Status: DISCONTINUED | OUTPATIENT
Start: 2020-11-18 | End: 2020-11-18

## 2020-11-18 RX ORDER — MEPERIDINE HYDROCHLORIDE 25 MG/ML
12.5 INJECTION INTRAMUSCULAR; INTRAVENOUS; SUBCUTANEOUS EVERY 5 MIN PRN
Status: DISCONTINUED | OUTPATIENT
Start: 2020-11-18 | End: 2020-11-18 | Stop reason: HOSPADM

## 2020-11-18 RX ORDER — SODIUM CHLORIDE 9 MG/ML
INJECTION, SOLUTION INTRAVENOUS CONTINUOUS
Status: DISCONTINUED | OUTPATIENT
Start: 2020-11-18 | End: 2020-11-18

## 2020-11-18 RX ADMIN — SODIUM CHLORIDE: 9 INJECTION, SOLUTION INTRAVENOUS at 08:58

## 2020-11-18 RX ADMIN — ROCURONIUM BROMIDE 10 MG: 10 INJECTION INTRAVENOUS at 11:11

## 2020-11-18 RX ADMIN — METRONIDAZOLE 500 MG: 500 INJECTION, SOLUTION INTRAVENOUS at 09:54

## 2020-11-18 RX ADMIN — HEPARIN SODIUM 5000 UNITS: 5000 INJECTION INTRAVENOUS; SUBCUTANEOUS at 09:14

## 2020-11-18 RX ADMIN — SODIUM CHLORIDE: 9 INJECTION, SOLUTION INTRAVENOUS at 18:28

## 2020-11-18 RX ADMIN — CIPROFLOXACIN 400 MG: 2 INJECTION, SOLUTION INTRAVENOUS at 09:12

## 2020-11-18 RX ADMIN — MIDAZOLAM 2 MG: 1 INJECTION INTRAMUSCULAR; INTRAVENOUS at 09:44

## 2020-11-18 RX ADMIN — Medication 10 ML: at 22:20

## 2020-11-18 RX ADMIN — ROCURONIUM BROMIDE 20 MG: 10 INJECTION INTRAVENOUS at 10:11

## 2020-11-18 RX ADMIN — MORPHINE SULFATE 30 MG: 1 INJECTION INTRAVENOUS at 16:06

## 2020-11-18 RX ADMIN — HYDROMORPHONE HYDROCHLORIDE 0.5 MG: 1 INJECTION, SOLUTION INTRAMUSCULAR; INTRAVENOUS; SUBCUTANEOUS at 10:28

## 2020-11-18 RX ADMIN — ROCURONIUM BROMIDE 50 MG: 10 INJECTION INTRAVENOUS at 09:49

## 2020-11-18 RX ADMIN — SUGAMMADEX 200 MG: 100 INJECTION, SOLUTION INTRAVENOUS at 11:55

## 2020-11-18 RX ADMIN — Medication 100 MCG: at 10:21

## 2020-11-18 RX ADMIN — FENTANYL CITRATE 100 MCG: 50 INJECTION INTRAMUSCULAR; INTRAVENOUS at 09:47

## 2020-11-18 RX ADMIN — HYDROMORPHONE HYDROCHLORIDE 0.5 MG: 1 INJECTION, SOLUTION INTRAMUSCULAR; INTRAVENOUS; SUBCUTANEOUS at 10:56

## 2020-11-18 RX ADMIN — IPRATROPIUM BROMIDE 2 SPRAY: 21 SPRAY NASAL at 22:19

## 2020-11-18 RX ADMIN — PROMETHAZINE HYDROCHLORIDE 6.25 MG: 25 INJECTION INTRAMUSCULAR; INTRAVENOUS at 12:57

## 2020-11-18 RX ADMIN — SODIUM CHLORIDE: 9 INJECTION, SOLUTION INTRAVENOUS at 09:42

## 2020-11-18 RX ADMIN — FENTANYL CITRATE 25 MCG: 50 INJECTION, SOLUTION INTRAMUSCULAR; INTRAVENOUS at 12:40

## 2020-11-18 RX ADMIN — PROMETHAZINE HYDROCHLORIDE 12.5 MG: 25 TABLET ORAL at 16:01

## 2020-11-18 RX ADMIN — LIDOCAINE HYDROCHLORIDE 100 MG: 20 INJECTION, SOLUTION EPIDURAL; INFILTRATION; INTRACAUDAL; PERINEURAL at 09:49

## 2020-11-18 RX ADMIN — PROPOFOL 200 MG: 10 INJECTION, EMULSION INTRAVENOUS at 09:49

## 2020-11-18 RX ADMIN — ONDANSETRON 4 MG: 2 INJECTION INTRAMUSCULAR; INTRAVENOUS at 11:55

## 2020-11-18 RX ADMIN — ROCURONIUM BROMIDE 10 MG: 10 INJECTION INTRAVENOUS at 11:23

## 2020-11-18 RX ADMIN — GLYCOPYRROLATE 0.2 MG: 0.2 INJECTION, SOLUTION INTRAMUSCULAR; INTRAVENOUS at 10:21

## 2020-11-18 RX ADMIN — KETOROLAC TROMETHAMINE 15 MG: 15 INJECTION, SOLUTION INTRAMUSCULAR; INTRAVENOUS at 22:18

## 2020-11-18 RX ADMIN — DEXAMETHASONE SODIUM PHOSPHATE 8 MG: 4 INJECTION, SOLUTION INTRAMUSCULAR; INTRAVENOUS at 10:07

## 2020-11-18 RX ADMIN — ROCURONIUM BROMIDE 10 MG: 10 INJECTION INTRAVENOUS at 10:41

## 2020-11-18 RX ADMIN — SODIUM CHLORIDE: 9 INJECTION, SOLUTION INTRAVENOUS at 11:11

## 2020-11-18 RX ADMIN — FENTANYL CITRATE 25 MCG: 50 INJECTION, SOLUTION INTRAMUSCULAR; INTRAVENOUS at 12:48

## 2020-11-18 RX ADMIN — KETOROLAC TROMETHAMINE 15 MG: 15 INJECTION, SOLUTION INTRAMUSCULAR; INTRAVENOUS at 16:03

## 2020-11-18 ASSESSMENT — PULMONARY FUNCTION TESTS
PIF_VALUE: 17
PIF_VALUE: 20
PIF_VALUE: 20
PIF_VALUE: 6
PIF_VALUE: 3
PIF_VALUE: 0
PIF_VALUE: 17
PIF_VALUE: 15
PIF_VALUE: 20
PIF_VALUE: 20
PIF_VALUE: 17
PIF_VALUE: 17
PIF_VALUE: 20
PIF_VALUE: 3
PIF_VALUE: 19
PIF_VALUE: 20
PIF_VALUE: 19
PIF_VALUE: 21
PIF_VALUE: 16
PIF_VALUE: 19
PIF_VALUE: 19
PIF_VALUE: 17
PIF_VALUE: 20
PIF_VALUE: 0
PIF_VALUE: 20
PIF_VALUE: 13
PIF_VALUE: 19
PIF_VALUE: 17
PIF_VALUE: 2
PIF_VALUE: 20
PIF_VALUE: 16
PIF_VALUE: 18
PIF_VALUE: 16
PIF_VALUE: 20
PIF_VALUE: 20
PIF_VALUE: 17
PIF_VALUE: 18
PIF_VALUE: 17
PIF_VALUE: 16
PIF_VALUE: 21
PIF_VALUE: 17
PIF_VALUE: 19
PIF_VALUE: 20
PIF_VALUE: 19
PIF_VALUE: 20
PIF_VALUE: 20
PIF_VALUE: 16
PIF_VALUE: 18
PIF_VALUE: 19
PIF_VALUE: 20
PIF_VALUE: 19
PIF_VALUE: 16
PIF_VALUE: 19
PIF_VALUE: 17
PIF_VALUE: 0
PIF_VALUE: 17
PIF_VALUE: 20
PIF_VALUE: 20
PIF_VALUE: 19
PIF_VALUE: 17
PIF_VALUE: 16
PIF_VALUE: 20
PIF_VALUE: 20
PIF_VALUE: 18
PIF_VALUE: 15
PIF_VALUE: 19
PIF_VALUE: 20
PIF_VALUE: 16
PIF_VALUE: 20
PIF_VALUE: 1
PIF_VALUE: 19
PIF_VALUE: 20
PIF_VALUE: 19
PIF_VALUE: 17
PIF_VALUE: 16
PIF_VALUE: 19
PIF_VALUE: 20
PIF_VALUE: 19
PIF_VALUE: 19
PIF_VALUE: 21
PIF_VALUE: 15
PIF_VALUE: 13
PIF_VALUE: 18
PIF_VALUE: 19
PIF_VALUE: 6
PIF_VALUE: 16
PIF_VALUE: 20
PIF_VALUE: 20
PIF_VALUE: 3
PIF_VALUE: 19
PIF_VALUE: 20
PIF_VALUE: 3
PIF_VALUE: 20
PIF_VALUE: 17
PIF_VALUE: 17
PIF_VALUE: 19
PIF_VALUE: 19
PIF_VALUE: 15
PIF_VALUE: 19
PIF_VALUE: 13
PIF_VALUE: 21
PIF_VALUE: 20
PIF_VALUE: 19
PIF_VALUE: 17
PIF_VALUE: 0
PIF_VALUE: 17
PIF_VALUE: 18
PIF_VALUE: 19
PIF_VALUE: 17
PIF_VALUE: 17
PIF_VALUE: 20
PIF_VALUE: 19
PIF_VALUE: 16
PIF_VALUE: 20
PIF_VALUE: 17
PIF_VALUE: 16
PIF_VALUE: 1
PIF_VALUE: 19
PIF_VALUE: 16
PIF_VALUE: 16
PIF_VALUE: 17
PIF_VALUE: 17
PIF_VALUE: 13
PIF_VALUE: 17
PIF_VALUE: 20
PIF_VALUE: 21
PIF_VALUE: 16
PIF_VALUE: 20
PIF_VALUE: 21
PIF_VALUE: 3
PIF_VALUE: 16
PIF_VALUE: 20
PIF_VALUE: 18

## 2020-11-18 ASSESSMENT — PAIN DESCRIPTION - ONSET
ONSET: ON-GOING

## 2020-11-18 ASSESSMENT — PAIN DESCRIPTION - PROGRESSION
CLINICAL_PROGRESSION: NOT CHANGED
CLINICAL_PROGRESSION: GRADUALLY IMPROVING

## 2020-11-18 ASSESSMENT — PAIN - FUNCTIONAL ASSESSMENT
PAIN_FUNCTIONAL_ASSESSMENT: ACTIVITIES ARE NOT PREVENTED
PAIN_FUNCTIONAL_ASSESSMENT: 0-10
PAIN_FUNCTIONAL_ASSESSMENT: PREVENTS OR INTERFERES SOME ACTIVE ACTIVITIES AND ADLS

## 2020-11-18 ASSESSMENT — PAIN DESCRIPTION - ORIENTATION
ORIENTATION: LOWER

## 2020-11-18 ASSESSMENT — PAIN DESCRIPTION - DESCRIPTORS
DESCRIPTORS: SORE
DESCRIPTORS: SORE
DESCRIPTORS: ACHING
DESCRIPTORS: SORE
DESCRIPTORS: SORE

## 2020-11-18 ASSESSMENT — ENCOUNTER SYMPTOMS: SHORTNESS OF BREATH: 0

## 2020-11-18 ASSESSMENT — PAIN DESCRIPTION - FREQUENCY
FREQUENCY: CONTINUOUS

## 2020-11-18 ASSESSMENT — PAIN DESCRIPTION - LOCATION
LOCATION: ABDOMEN

## 2020-11-18 ASSESSMENT — PAIN SCALES - GENERAL
PAINLEVEL_OUTOF10: 3
PAINLEVEL_OUTOF10: 6
PAINLEVEL_OUTOF10: 8
PAINLEVEL_OUTOF10: 6
PAINLEVEL_OUTOF10: 6
PAINLEVEL_OUTOF10: 3
PAINLEVEL_OUTOF10: 7

## 2020-11-18 ASSESSMENT — PAIN DESCRIPTION - PAIN TYPE
TYPE: SURGICAL PAIN

## 2020-11-18 ASSESSMENT — LIFESTYLE VARIABLES: SMOKING_STATUS: 0

## 2020-11-18 NOTE — PROGRESS NOTES
Pt arrived to room 4266 from PACU. Pt resting comfortably in bed. Pt states pain is 6/10 and having some nausea. Pt has midline incision dressing clean dry and intact. Pt VSS. Pt has call light in reach, bed in lowest position, with bed alarm on. Pt sister at bedside. Pt would like cup of ice chips. Pt denies any needs at this time, will continue to monitor.

## 2020-11-18 NOTE — BRIEF OP NOTE
Brief Postoperative Note      Patient: Tonya Vyas  YOB: 1965  MRN: [id number]    Date of Procedure: 11/18/2020    Pre-Op Diagnosis: DIVERTICULITIS    Post-Op Diagnosis: Same       Procedure(s):  SIGMOID COLECTOMY with coloproctostomy    Surgeon(s):  Edmund Kapoor MD    Assistant:  Surgical Assistant: Kuldip Bryant    Anesthesia: General    Estimated Blood Loss (mL): less than 913     Complications: None    Specimens:   ID Type Source Tests Collected by Time Destination   A : SIGMOID COLON Tissue Tissue SURGICAL PATHOLOGY Edmund Kapoor MD 11/18/2020 1024        Implants:  * No implants in log *      Drains:   Urethral Catheter Double-lumen 16 fr (Active)       Findings: no complications    Electronically signed by Deann Ray MD on 11/18/2020 at 11:56 AM

## 2020-11-18 NOTE — ANESTHESIA POSTPROCEDURE EVALUATION
Department of Anesthesiology  Postprocedure Note    Patient: Florencio Sesay  MRN: [id number]  YOB: 1965  Date of evaluation: 11/18/2020  Time:  3:36 PM     Procedure Summary     Date:  11/18/20 Room / Location:   [facility] Hanson [address] [place] [address]    Anesthesia Start:  1200 Anesthesia Stop:  6978    Procedure:  SIGMOID COLECTOMY (N/A Abdomen) Diagnosis:       Diverticulitis      (DIVERTICULITIS)    Surgeon:  Marcelina Redding MD Responsible Provider:  Smitha Boland MD    Anesthesia Type:  general ASA Status:  3          Anesthesia Type: general    Natalia Phase I: Natalia Score: 8    Natalia Phase II:      Last vitals: Reviewed and per EMR flowsheets.        Anesthesia Post Evaluation    Patient location during evaluation: PACU  Patient participation: complete - patient participated  Level of consciousness: awake and alert  Pain score: 0  Airway patency: patent  Nausea & Vomiting: no nausea and no vomiting  Complications: no  Cardiovascular status: blood pressure returned to baseline  Respiratory status: acceptable  Hydration status: euvolemic

## 2020-11-18 NOTE — ANESTHESIA PRE PROCEDURE
Department of Anesthesiology  Preprocedure Note       Name:  Anthony Prabhakar   Age:  54 y.o.  :  1965                                          MRN:  [id number]         Date:  2020      Surgeon: Richar Mclaughlin):  Efren Moses MD    Procedure: Procedure(s):  SIGMOID COLECTOMY    Medications prior to admission:   Prior to Admission medications    Medication Sig Start Date End Date Taking? Authorizing Provider   Dextromethorphan-guaiFENesin (MUCINEX DM MAXIMUM STRENGTH)  MG TB12 Take 1 tablet by mouth every 12 hours   Yes Historical Provider, MD   ELDERBERRY PO Take 1 tablet by mouth daily gummie   Yes Historical Provider, MD   liothyronine (CYTOMEL) 5 MCG tablet TAKE TWO TABLETS BY MOUTH EVERY DAY 10/21/20  Yes Austin Rivera MD   gabapentin (NEURONTIN) 100 MG capsule TAKE 1 CAPSULE BY MOUTH EVERY MORNING AND TAKE 4 CAPSULES BY MOUTH EVERY EVENING  Patient taking differently: TAKE 4 CAPSULES BY MOUTH EVERY EVENING 20 Yes Austin Rivera MD   buPROPion (WELLBUTRIN XL) 150 MG extended release tablet TAKE ONE TABLET BY MOUTH EVERY MORNING 20  Yes NATALYA Mar - CNP   ipratropium (ATROVENT) 0.03 % nasal spray INSTILL 2 SPRAYS IN EACH NOSTRIL EVERY 12 HOURS  Patient taking differently: 2 sprays by Nasal route as needed  20  Yes Yodit Yoder MD   levocetirizine (XYZAL) 5 MG tablet TAKE ONE TABLET BY MOUTH NIGHTLY 10/14/19  Yes Austin Rivera MD   Multiple Vitamin (MULTIVITAMINS PO) Take 1 tablet by mouth daily    Yes Historical Provider, MD   Ascorbic Acid (VITAMIN C) 500 MG tablet Take 500 mg by mouth daily. Yes Historical Provider, MD   magnesium 30 MG tablet Take 400 mg by mouth daily    Yes Historical Provider, MD   zinc gluconate 50 MG tablet Take 50 mg by mouth daily. Yes Historical Provider, MD   fluocinonide (LIDEX) 0.05 % ointment Apply sparingly to affected area(s) bid prn for flares, up to 2 weeks at a time. Do not apply on cleared skin.  3/5/20   Nica Frederick Thomas MD   scopolamine (TRANSDERM-SCOP, 1.5 MG,) transdermal patch Place 1 patch onto the skin every 72 hours Apply to behind ear starting 4 hours before travel. Patient not taking: Reported on 10/22/2020 1/24/20 1/23/21  Stephany Whitfield MD       Current medications:    Current Facility-Administered Medications   Medication Dose Route Frequency Provider Last Rate Last Dose    ciprofloxacin (CIPRO) IVPB 400 mg  400 mg Intravenous Once Berlin Aguilera  mL/hr at 11/18/20 0912 400 mg at 11/18/20 0912    metronidazole (FLAGYL) 500 mg in NaCl 100 mL IVPB premix  500 mg Intravenous Once Berlin Aguilera MD        heparin (porcine) injection 5,000 Units  5,000 Units Subcutaneous Once Berlin Aguilera MD        0.9 % sodium chloride infusion   Intravenous Continuous Kalpesh Curran  mL/hr at 11/18/20 0858      sodium chloride flush 0.9 % injection 10 mL  10 mL Intravenous 2 times per day Klapesh Curran MD        sodium chloride flush 0.9 % injection 10 mL  10 mL Intravenous PRN Kalpesh Curran MD           Allergies:     Allergies   Allergen Reactions    Other Itching     thermersol-preservative for contact solution-pt states got itchy eyes    Penicillins Hives    Nickel Rash       Problem List:    Patient Active Problem List   Diagnosis Code    Hypercholesteremia due to HDL > 100 E78.00    Low testosterone level in female R79.89    Elevated C-reactive protein (CRP) R79.82    Primary osteoarthritis of first carpometacarpal joint of left hand M18.12    Family history of Alzheimer's disease Z82.0    Eczema L30.9    Acquired underactive thyroid E03.9    Reactive airway disease J45.909    Chronic allergic rhinitis J30.9    Eustachian catarrh, bilateral H68.003    Allergic sinusitis J30.9    Neuritis M79.2    Vasomotor rhinitis J30.0    Diverticulitis K57.92    Trigger middle finger of right hand M65.331       Past Medical History:        Diagnosis Date  Acquired underactive thyroid 4/20/2017    Asthma     when have colds goes into asthma    Chronic allergic rhinitis 4/3/2018    Diverticulitis 2006    colon 2006    Family history of Alzheimer's disease 11/15/2016    Hypercholesteremia due to HDL > 100 10/17/2013    no meds    Primary osteoarthritis of first carpometacarpal joint of left hand 9/25/2015       Past Surgical History:        Procedure Laterality Date    COLONOSCOPY      x2   Medardo Cabrera liposuction-abdomen and thighs    EYE SURGERY      corrective    FINGER TRIGGER RELEASE Left 5/19/16    Middle Finger    HAND SURGERY Left 5/8/14    Middle Finger Ulnar Digital Nerve contusion and neuropraxia       Social History:    Social History     Tobacco Use    Smoking status: Never Smoker    Smokeless tobacco: Never Used    Tobacco comment: [facility]   Substance Use Topics    Alcohol use:  Yes     Alcohol/week: 5.0 standard drinks     Types: 5 Standard drinks or equivalent per week     Frequency: Patient refused     Comment: socially                                Counseling given: Not Answered  Comment: [facility]      Vital Signs (Current):   Vitals:    11/17/20 1120 11/18/20 0844   BP:  (!) 146/91   Pulse:  86   Resp:  18   Temp:  98.3 °F (36.8 °C)   TempSrc:  Temporal   SpO2:  97%   Weight: 175 lb (79.4 kg) 174 lb 2.6 oz (79 kg)   Height: 5' 4\" (1.626 m)                                               BP Readings from Last 3 Encounters:   11/18/20 (!) 146/91   10/22/20 104/70   10/15/20 (!) 116/90       NPO Status: Time of last liquid consumption: 2300                        Time of last solid consumption: 2300                        Date of last liquid consumption: 11/17/20                        Date of last solid food consumption: 11/17/20    BMI:   Wt Readings from Last 3 Encounters:   11/18/20 174 lb 2.6 oz (79 kg)   10/22/20 176 lb (79.8 kg)   10/19/20 173 lb (78.5 kg)     Body mass index is 29.9 kg/m².    CBC:   Lab Results   Component Value Date    WBC 6.5 10/09/2020    RBC 4.19 10/09/2020    HGB 12.3 10/09/2020    HCT 36.3 10/09/2020    MCV 86.7 10/09/2020    RDW 13.8 10/09/2020     10/09/2020       CMP:   Lab Results   Component Value Date     10/23/2020    K 4.6 10/23/2020    K 4.3 10/04/2020     10/23/2020    CO2 27 10/23/2020    BUN 13 10/23/2020    CREATININE 0.6 10/23/2020    GFRAA >60 10/23/2020    GFRAA >60 03/22/2012    AGRATIO 1.1 10/04/2020    LABGLOM >60 10/23/2020    GLUCOSE 105 10/23/2020    PROT 7.0 10/07/2020    PROT 6.8 03/22/2012    CALCIUM 9.7 10/23/2020    BILITOT <0.2 10/07/2020    ALKPHOS 78 10/07/2020    AST 18 10/07/2020    ALT 7 10/07/2020       POC Tests: No results for input(s): POCGLU, POCNA, POCK, POCCL, POCBUN, POCHEMO, POCHCT in the last 72 hours.     Coags:   Lab Results   Component Value Date    PROTIME 12.8 10/04/2020    INR 1.10 10/04/2020    APTT 26.2 10/04/2020       HCG (If Applicable):   Lab Results   Component Value Date    PREGTESTUR Negative 05/19/2016        ABGs: No results found for: PHART, PO2ART, FNM3IHL, VGL9WFV, BEART, L2ORTGGS     Type & Screen (If Applicable):  No results found for: LABABO, LABRH    Drug/Infectious Status (If Applicable):  No results found for: HIV, HEPCAB    COVID-19 Screening (If Applicable):   Lab Results   Component Value Date    COVID19 Not Detected 11/13/2020         Anesthesia Evaluation  Patient summary reviewed and Nursing notes reviewed no history of anesthetic complications:   Airway: Mallampati: II  TM distance: >3 FB   Neck ROM: full  Mouth opening: > = 3 FB Dental: normal exam         Pulmonary: breath sounds clear to auscultation  (+) asthma:     (-) pneumonia, COPD, shortness of breath, recent URI, sleep apnea and not a current smoker                           Cardiovascular:Negative CV ROS  Exercise tolerance: good (>4 METS),           Rhythm: regular                      Neuro/Psych:   Negative Neuro/Psych ROS              GI/Hepatic/Renal:        (-) hiatal hernia, GERD, PUD, hepatitis, liver disease, no renal disease, bowel prep and no morbid obesity      ROS comment: diverticulitis. Endo/Other:    (+) hypothyroidism: arthritis: OA., no malignancy/cancer. (-) diabetes mellitus, hyperthyroidism, blood dyscrasia, no electrolyte abnormalities, no malignancy/cancer               Abdominal:           Vascular: negative vascular ROS. Anesthesia Plan      general     ASA 3       Induction: intravenous. MIPS: Postoperative opioids intended and Prophylactic antiemetics administered. Anesthetic plan and risks discussed with patient. Plan discussed with CRNA. Edmund Quick MD   11/18/2020      This pre-anesthesia assessment may be used as a history and physical.    DOS STAFF ADDENDUM:    Pt seen and examined, chart reviewed (including anesthesia, drug and allergy history). No interval changes to history and physical examination. Anesthetic plan, risks, benefits, alternatives, and personnel involved discussed with patient. Patient verbalized an understanding and agrees to proceed.       Edmund Quick MD  November 18, 2020  9:14 AM

## 2020-11-18 NOTE — CARE COORDINATION
INITIAL CASE MANAGEMENT ASSESSMENT    Reviewed chart, met with patient to assess possible discharge needs. Explained Case Management role/services. Living Situation: Lives alone in 2 story house, prior to admission managed steps just fine, she will stay on main level post op    ADLs: Independent     DME: None    PT/OT Recs: Not ordered, patient is a Physical Therapist employed with [employer] doesn't think she'll need assistance     Active Services: None     Transportation: Active , sister or significant other will transport home     Medications: Uses [employer] order, she is okay with using [employer] or [address] at [place]. - no trouble purchasing meds    PCP: Cristopher Jarrett    PLAN/COMMENTS: Patient plans on returning home at discharge. Currently doesn't think she'll need assistance at home. Continue to monitor for needs & home O2, on 2L now. CM provided contact information for patient or family to call with any questions. CM will follow and assist as needed.     Electronically signed by Caprice Jorge RN on 11/18/2020 at 4:17 PM

## 2020-11-18 NOTE — H&P
PATIENT NAME: Fanta Caldwell   YOB: 1965    ADMISSION DATE: 11/18/2020  8:20 AM      TODAY'S DATE: 11/18/2020    CHIEF COMPLAINT:  Abdominal pain      HISTORY OF PRESENT ILLNESS:  The patient is a 54 y.o. female  who presents with recurring episodes of sigmoid diverticulitis. Multiple episodes over past several years including recent admission for abscess. Resolved on antibiotics and presents for sigmoid resection today. The risks, benefits and alternatives to the planned procedure were discussed. Patient expressed an understanding and is willing to proceed.       Past Medical History:        Diagnosis Date    Acquired underactive thyroid 4/20/2017    Asthma     when have colds goes into asthma    Chronic allergic rhinitis 4/3/2018    Diverticulitis 2006    colon 2006    Family history of Alzheimer's disease 11/15/2016    Hypercholesteremia due to HDL > 100 10/17/2013    no meds    Primary osteoarthritis of first carpometacarpal joint of left hand 9/25/2015       Past Surgical History:        Procedure Laterality Date    COLONOSCOPY      x2   Bautista Grossman liposuction-abdomen and thighs    EYE SURGERY      corrective    FINGER TRIGGER RELEASE Left 5/19/16    Middle Finger    HAND SURGERY Left 5/8/14    Middle Finger Ulnar Digital Nerve contusion and neuropraxia       Medications Prior to Admission:   Medications Prior to Admission: Dextromethorphan-guaiFENesin (MUCINEX DM MAXIMUM STRENGTH)  MG TB12, Take 1 tablet by mouth every 12 hours  ELDERBERRY PO, Take 1 tablet by mouth daily gummie  liothyronine (CYTOMEL) 5 MCG tablet, TAKE TWO TABLETS BY MOUTH EVERY DAY  gabapentin (NEURONTIN) 100 MG capsule, TAKE 1 CAPSULE BY MOUTH EVERY MORNING AND TAKE 4 CAPSULES BY MOUTH EVERY EVENING (Patient taking differently: TAKE 4 CAPSULES BY MOUTH EVERY EVENING)  buPROPion (WELLBUTRIN XL) 150 MG extended release tablet, TAKE ONE TABLET BY MOUTH EVERY MORNING  ipratropium (ATROVENT) 0.03 % nasal spray, INSTILL 2 SPRAYS IN EACH NOSTRIL EVERY 12 HOURS (Patient taking differently: 2 sprays by Nasal route as needed )  levocetirizine (XYZAL) 5 MG tablet, TAKE ONE TABLET BY MOUTH NIGHTLY  Multiple Vitamin (MULTIVITAMINS PO), Take 1 tablet by mouth daily   Ascorbic Acid (VITAMIN C) 500 MG tablet, Take 500 mg by mouth daily. magnesium 30 MG tablet, Take 400 mg by mouth daily   zinc gluconate 50 MG tablet, Take 50 mg by mouth daily. fluocinonide (LIDEX) 0.05 % ointment, Apply sparingly to affected area(s) bid prn for flares, up to 2 weeks at a time. Do not apply on cleared skin.  scopolamine (TRANSDERM-SCOP, 1.5 MG,) transdermal patch, Place 1 patch onto the skin every 72 hours Apply to behind ear starting 4 hours before travel. (Patient not taking: Reported on 10/22/2020)    Allergies: Other; Penicillins; and Nickel    Social History:   TOBACCO:   reports that she has never smoked. She has never used smokeless tobacco.  ETOH:   reports current alcohol use of about 5.0 standard drinks of alcohol per week. DRUGS:   reports no history of drug use. Family History:       Problem Relation Age of Onset    Cancer Mother         uterine sarcoma, skin non-melanoma    Stroke Mother     Alzheimer's Disease Mother     Cancer Father         skin non-melanoma    Alzheimer's Disease Father        REVIEW OF SYSTEMS:    CONSTITUTIONAL:  negative  HEENT:  negative  CARDIOVASCULAR:  negative  GASTROINTESTINAL:  positive for abdominal pain  GENITOURINARY:  negative  HEMATOLOGIC/LYMPHATIC:  negative  ENDOCRINE:  negative  All other systems negative    PHYSICAL EXAM:    VITALS:  BP (!) 146/91   Pulse 86   Temp 98.3 °F (36.8 °C) (Temporal)   Resp 18   Ht 5' 4\" (1.626 m)   Wt 174 lb 2.6 oz (79 kg)   LMP 12/15/2015   SpO2 97%   BMI 29.90 kg/m²   INTAKE/OUTPUT:   No intake/output data recorded. No intake/output data recorded.   CONSTITUTIONAL:  awake, alert, no apparent distress and normal weight  ENT:  normocepalic, without obvious abnormality  NECK:  supple, symmetrical, trachea midline   LUNGS:  clear to auscultation, no crackles or wheezing  CARDIOVASCULAR:  regular rate and rhythm and no murmur noted  ABDOMEN: non tender, non distended today. No masses or hernias  MUSCULOSKELETAL:  0+ pitting edema lower extremities  NEUROLOGIC:  Mental Status Exam:  Level of Alertness:   awake  Orientation:   person, place, time      ASSESSMENT AND PLAN:    Sigmoid diverticulitis  For sigmoid colectomy today  The risks, benefits and alternatives to the planned procedure were discussed. Patient expressed an understanding and is willing to proceed.     Electronically signed by Bennie Royal MD on 11/18/2020 at 9:29 AM        Bennie Royal

## 2020-11-19 LAB
ANION GAP SERPL CALCULATED.3IONS-SCNC: 10 MMOL/L (ref 3–16)
BUN BLDV-MCNC: 12 MG/DL (ref 7–20)
CALCIUM SERPL-MCNC: 8.4 MG/DL (ref 8.3–10.6)
CHLORIDE BLD-SCNC: 101 MMOL/L (ref 99–110)
CO2: 25 MMOL/L (ref 21–32)
CREAT SERPL-MCNC: 0.6 MG/DL (ref 0.6–1.1)
GFR AFRICAN AMERICAN: >60
GFR NON-AFRICAN AMERICAN: >60
GLUCOSE BLD-MCNC: 126 MG/DL (ref 70–99)
HCT VFR BLD CALC: 35.5 % (ref 36–48)
HEMOGLOBIN: 11.8 G/DL (ref 12–16)
MCH RBC QN AUTO: 29.6 PG (ref 26–34)
MCHC RBC AUTO-ENTMCNC: 33.3 G/DL (ref 31–36)
MCV RBC AUTO: 88.8 FL (ref 80–100)
PDW BLD-RTO: 15 % (ref 12.4–15.4)
PLATELET # BLD: 318 K/UL (ref 135–450)
PMV BLD AUTO: 6.4 FL (ref 5–10.5)
POTASSIUM REFLEX MAGNESIUM: 4 MMOL/L (ref 3.5–5.1)
RBC # BLD: 4 M/UL (ref 4–5.2)
SODIUM BLD-SCNC: 136 MMOL/L (ref 136–145)
WBC # BLD: 6.4 K/UL (ref 4–11)

## 2020-11-19 PROCEDURE — 85027 COMPLETE CBC AUTOMATED: CPT

## 2020-11-19 PROCEDURE — 6370000000 HC RX 637 (ALT 250 FOR IP): Performed by: NURSE PRACTITIONER

## 2020-11-19 PROCEDURE — 1200000000 HC SEMI PRIVATE

## 2020-11-19 PROCEDURE — 2700000000 HC OXYGEN THERAPY PER DAY

## 2020-11-19 PROCEDURE — 6360000002 HC RX W HCPCS: Performed by: SURGERY

## 2020-11-19 PROCEDURE — 36415 COLL VENOUS BLD VENIPUNCTURE: CPT

## 2020-11-19 PROCEDURE — 6370000000 HC RX 637 (ALT 250 FOR IP): Performed by: SURGERY

## 2020-11-19 PROCEDURE — 2580000003 HC RX 258: Performed by: SURGERY

## 2020-11-19 PROCEDURE — 94770 HC ETCO2 MONITOR DAILY: CPT

## 2020-11-19 PROCEDURE — 80048 BASIC METABOLIC PNL TOTAL CA: CPT

## 2020-11-19 PROCEDURE — 94761 N-INVAS EAR/PLS OXIMETRY MLT: CPT

## 2020-11-19 PROCEDURE — 99024 POSTOP FOLLOW-UP VISIT: CPT | Performed by: SURGERY

## 2020-11-19 RX ORDER — MORPHINE SULFATE 2 MG/ML
2 INJECTION, SOLUTION INTRAMUSCULAR; INTRAVENOUS
Status: DISCONTINUED | OUTPATIENT
Start: 2020-11-19 | End: 2020-11-21 | Stop reason: HOSPADM

## 2020-11-19 RX ORDER — OXYCODONE HYDROCHLORIDE 10 MG/1
10 TABLET ORAL EVERY 4 HOURS PRN
Status: DISCONTINUED | OUTPATIENT
Start: 2020-11-19 | End: 2020-11-21 | Stop reason: HOSPADM

## 2020-11-19 RX ORDER — OXYCODONE HYDROCHLORIDE 5 MG/1
5 TABLET ORAL EVERY 4 HOURS PRN
Status: DISCONTINUED | OUTPATIENT
Start: 2020-11-19 | End: 2020-11-21 | Stop reason: HOSPADM

## 2020-11-19 RX ORDER — MORPHINE SULFATE 4 MG/ML
4 INJECTION, SOLUTION INTRAMUSCULAR; INTRAVENOUS
Status: DISCONTINUED | OUTPATIENT
Start: 2020-11-19 | End: 2020-11-21 | Stop reason: HOSPADM

## 2020-11-19 RX ADMIN — KETOROLAC TROMETHAMINE 15 MG: 15 INJECTION, SOLUTION INTRAMUSCULAR; INTRAVENOUS at 09:18

## 2020-11-19 RX ADMIN — SODIUM CHLORIDE: 9 INJECTION, SOLUTION INTRAVENOUS at 19:40

## 2020-11-19 RX ADMIN — SODIUM CHLORIDE: 9 INJECTION, SOLUTION INTRAVENOUS at 05:29

## 2020-11-19 RX ADMIN — Medication 10 ML: at 16:32

## 2020-11-19 RX ADMIN — IPRATROPIUM BROMIDE 2 SPRAY: 21 SPRAY NASAL at 22:03

## 2020-11-19 RX ADMIN — OXYCODONE HYDROCHLORIDE 5 MG: 5 TABLET ORAL at 22:08

## 2020-11-19 RX ADMIN — OXYCODONE HYDROCHLORIDE 5 MG: 5 TABLET ORAL at 13:03

## 2020-11-19 RX ADMIN — IPRATROPIUM BROMIDE 2 SPRAY: 21 SPRAY NASAL at 09:19

## 2020-11-19 RX ADMIN — KETOROLAC TROMETHAMINE 15 MG: 15 INJECTION, SOLUTION INTRAMUSCULAR; INTRAVENOUS at 03:27

## 2020-11-19 RX ADMIN — KETOROLAC TROMETHAMINE 15 MG: 15 INJECTION, SOLUTION INTRAMUSCULAR; INTRAVENOUS at 16:31

## 2020-11-19 RX ADMIN — KETOROLAC TROMETHAMINE 15 MG: 15 INJECTION, SOLUTION INTRAMUSCULAR; INTRAVENOUS at 22:03

## 2020-11-19 RX ADMIN — ENOXAPARIN SODIUM 40 MG: 40 INJECTION SUBCUTANEOUS at 09:18

## 2020-11-19 RX ADMIN — LIOTHYRONINE SODIUM 10 MCG: 5 TABLET ORAL at 09:18

## 2020-11-19 RX ADMIN — CETIRIZINE HYDROCHLORIDE 5 MG: 10 TABLET, FILM COATED ORAL at 09:18

## 2020-11-19 RX ADMIN — BUPROPION HYDROCHLORIDE 150 MG: 150 TABLET, EXTENDED RELEASE ORAL at 09:18

## 2020-11-19 ASSESSMENT — PAIN SCALES - GENERAL
PAINLEVEL_OUTOF10: 5
PAINLEVEL_OUTOF10: 4
PAINLEVEL_OUTOF10: 2
PAINLEVEL_OUTOF10: 4
PAINLEVEL_OUTOF10: 5
PAINLEVEL_OUTOF10: 5
PAINLEVEL_OUTOF10: 3
PAINLEVEL_OUTOF10: 6

## 2020-11-19 ASSESSMENT — PAIN DESCRIPTION - PROGRESSION
CLINICAL_PROGRESSION: GRADUALLY IMPROVING
CLINICAL_PROGRESSION: GRADUALLY WORSENING
CLINICAL_PROGRESSION: GRADUALLY WORSENING
CLINICAL_PROGRESSION: GRADUALLY IMPROVING

## 2020-11-19 ASSESSMENT — PAIN DESCRIPTION - ONSET
ONSET: ON-GOING

## 2020-11-19 ASSESSMENT — PAIN DESCRIPTION - FREQUENCY
FREQUENCY: CONTINUOUS

## 2020-11-19 ASSESSMENT — PAIN DESCRIPTION - PAIN TYPE
TYPE: SURGICAL PAIN

## 2020-11-19 ASSESSMENT — PAIN DESCRIPTION - LOCATION
LOCATION: ABDOMEN

## 2020-11-19 ASSESSMENT — PAIN - FUNCTIONAL ASSESSMENT
PAIN_FUNCTIONAL_ASSESSMENT: ACTIVITIES ARE NOT PREVENTED

## 2020-11-19 ASSESSMENT — PAIN DESCRIPTION - ORIENTATION
ORIENTATION: MID;LOWER
ORIENTATION: MID
ORIENTATION: LOWER;MID
ORIENTATION: MID;LOWER

## 2020-11-19 ASSESSMENT — PAIN DESCRIPTION - DESCRIPTORS
DESCRIPTORS: DISCOMFORT

## 2020-11-19 NOTE — PROGRESS NOTES
Pt arrived to unit in bed from 4N. Pt A&O x4. Pt with NS and PCA pump infusing into patent PIV. Pt states pain is manageable. Current pain a 3/10. Pt satisfied with pain management. Pt oriented to room, vitals obtained and stable. Personal items put away and how pt would like. Pt given fresh water. Will continue to monitor.

## 2020-11-19 NOTE — PROGRESS NOTES
Morphine PCA pump stopped as ordered and wasted remaining amount of 2cc witnessed by [employer].      Electronically signed by Cami Romero RN on 11/19/2020 at 1:09 PM     Electronically signed by Brian Hanley RN on 11/19/2020 at 1:12 PM

## 2020-11-19 NOTE — OP NOTE
[address] [place] [address]                                OPERATIVE REPORT    PATIENT NAME: JEAN CARLOS OLVERA                            :        1965  MED REC NO:   [id number]                          ROOM:       4266  ACCOUNT NO:   [de-identified]                           ADMIT DATE: 2020  PROVIDER:     Jose Bender MD    DATE OF PROCEDURE:  2020    PREOPERATIVE DIAGNOSIS:  Diverticulitis. POSTOPERATIVE DIAGNOSIS:  Diverticulitis. OPERATION PERFORMED:  Open sigmoid resection with coloproctostomy. SURGEON:  Jose Bender MD    SPECIMENS:  sigmoid colon. COMPLICATIONS:  None. EBL: less than 100 mls    DISPOSITION:  To recovery in stable condition. INDICATIONS:  The patient is a 59-year-old female with previous bouts of  diverticulitis. She was recently admitted with an abscess, which  resolved on antibiotics. Given the recurrent nature of the disease as  well as progression to now perforation and abscess, she elects to  proceed with elective sigmoid resection. The risks, benefits, and  alternatives were reviewed, and we proceed today. OPERATIVE PROCEDURE:  The patient was brought to the operating room,  placed supine, general anesthesia intubation performed. A Matthews  catheter placed. She was repositioned into lithotomy and the abdomen  prepped and draped in a sterile fashion. A midline incision was made  from the umbilicus to the lower abdomen. Dissection was carried through  the subcutaneous tissue and the fascia opened along the midline. The Bookwalter retractor was placed, and we packed the small bowel  cephalad. The sigmoid colon was exposed and the peritoneal reflection  on the distal descending colon incised. This allowed us to mobilize the  remaining sigmoid up onto its mesentery.   There was a redundant segment  folded against the presacral space, which required additional  dissection. This section contained prior abscess, which had scarred. There was no active purulence, but there was an obvious area of  inflammatory change consistent with the recently diagnosed abscess. Once this was dissected free, the entire sigmoid from the descending  colon to the rectum was now mobilized. The proximal bowel was divided  with a MILLER stapler, and a LigaSure device was used to divide the  mesentery until we were on the proximal rectum. A Contour stapler was  used to divide distally. Rigid sigmoidoscope was now inserted to ensure  there were no other abnormalities in the rectum and once this was  confirmed, a 29 EEA stapler was chosen. The proximal staple line was  opened and the anvil placed into the descending colon. This was stapled  shut and the pin brought through the midportion of the staple line. The stapler was now passed transanally and brought into the proximal  rectum. We brought the pin of the stapler through the anterior surface  of the rectum away from the staple line. The ends were attached,  brought together and fired. There were two complete anastomotic rings  noted. We now filled the pelvis with fluid and the rigid sigmoidoscope  was inserted and brought up to the staple line. Insufflation was done  into the colon and no air bubbling was noted. The abdomen was suctioned  clear, hemostasis confirmed, and the retractor removed. Sponge and instrument counts were confirmed to be correct, and the  midline wound injected with Marcaine. The fascia was then closed with a  0 looped PDS. The skin was closed with staples. Dressings were  applied, and the patient transferred to recovery in good condition.         Camila Galindo MD    D: 11/18/2020 14:52:24       T: 11/18/2020 15:01:09     [id number]  Job#: [id number]     Doc#: [id number]    CC:

## 2020-11-19 NOTE — PROGRESS NOTES
[facility] [phone]                                     Daily Progress Note                                                         Pt Name: Jose G Arellano Record Number: [id number]  Date of Birth 1965   Today's Date: 11/19/2020  CC: post op    ASSESSMENT/PLAN  Recurrent diverticulitis  -POD # 1 11/18 open sigmoid resection with coloproctostomy  -DC salmon  -pain controlled. DC PCA, PRN morphine, oxycodone. Continue scheduled toradol   -OOB, cough and deep breathe, IS, ambulate  -DVT prophy lovenox, SCDs  -stable post op. Awaiting return of GI function       Discharge Planning: continue inpatient      SUBJECTIVE  Fanta has improved from yesterday. Pain is well controlled. She has no nausea and no vomiting. She has not passed flatus and has not had a bowel movement. She is tolerating thin liquids. OBJECTIVE  VITALS:  height is 5' 4\" (1.626 m) and weight is 174 lb 9.7 oz (79.2 kg). Her oral temperature is 98 °F (36.7 °C). Her blood pressure is 147/83 (abnormal) and her pulse is 87. Her respiration is 20 and oxygen saturation is 95%. INTAKE/OUTPUT:    Intake/Output Summary (Last 24 hours) at 11/19/2020 1201  Last data filed at 11/19/2020 3372  Gross per 24 hour   Intake 2317 ml   Output 875 ml   Net 1442 ml     GENERAL: alert, no distress  LUNGS: clear to ausculation, without wheezes, rales or rhonci  HEART: normal rate and regular rhythm  ABDOMEN: soft, non-tender, non-distended and bowel sounds present in all 4 quadrants  EXTREMITY: no cyanosis, clubbing or edema    I/O last 3 completed shifts:   In: 0936 [P.O.:540; I.V.:2717]  Out: 950 [Urine:825; Blood:125]      LABS  Recent Labs     11/19/20  0802   WBC 6.4   HGB 11.8*   HCT 35.5*         K 4.0      CO2 25   BUN 12   CREATININE 0.6   CALCIUM 8.4       EDUCATION  Patient educated about Disease Process, Medications, Smoking Cessation, Oxygenation, Incentive Spirometry and Deep Breath and Cough, Diabetes, Hyperlipidemia, Smoking Cessation, Nutrition, Exercise and Hypertension    Electronically signed by NATALYA Stone CNP on 11/19/2020 at 11:58 AM      [facility]   [phone] Office  [phone] Cell     As above  POD 1 from sigmoid colectomy  Doing better today, pain controlled    OOB, clears today, pulmonary toilette    Electronically signed by Alize Arias MD on 11/19/2020 at 4:24 PM

## 2020-11-19 NOTE — PLAN OF CARE
Problem: Falls - Risk of:  Goal: Will remain free from falls  Description: Will remain free from falls  Outcome: Ongoing  Note: Fall risk assessment completed as charted. Pt is at risk for falls. Safety precautions in place. Call light and pt belongings in reach. Bed in low position. Bed/Chair Alarm on. Nonskid footwear on. All needs met. Pt instructed to call before getting up or out of bed. Pt verbalized understanding. Problem: Pain:  Goal: Pain level will decrease  Description: Pain level will decrease  Outcome: Ongoing  Note: Pt assessed for pain this shift. Pt pain/discomfort is managed with PRN pain medications per md order. Pt able to verbalize pain by using numerical scale. Education provided and documented.

## 2020-11-20 LAB
ANION GAP SERPL CALCULATED.3IONS-SCNC: 5 MMOL/L (ref 3–16)
BUN BLDV-MCNC: 7 MG/DL (ref 7–20)
CALCIUM SERPL-MCNC: 8.3 MG/DL (ref 8.3–10.6)
CHLORIDE BLD-SCNC: 104 MMOL/L (ref 99–110)
CO2: 26 MMOL/L (ref 21–32)
CREAT SERPL-MCNC: <0.5 MG/DL (ref 0.6–1.1)
GFR AFRICAN AMERICAN: >60
GFR NON-AFRICAN AMERICAN: >60
GLUCOSE BLD-MCNC: 103 MG/DL (ref 70–99)
HCT VFR BLD CALC: 31.5 % (ref 36–48)
HEMOGLOBIN: 11 G/DL (ref 12–16)
MCH RBC QN AUTO: 30.4 PG (ref 26–34)
MCHC RBC AUTO-ENTMCNC: 34.9 G/DL (ref 31–36)
MCV RBC AUTO: 86.9 FL (ref 80–100)
PDW BLD-RTO: 14.4 % (ref 12.4–15.4)
PLATELET # BLD: 289 K/UL (ref 135–450)
PMV BLD AUTO: 6.4 FL (ref 5–10.5)
POTASSIUM REFLEX MAGNESIUM: 4.2 MMOL/L (ref 3.5–5.1)
RBC # BLD: 3.62 M/UL (ref 4–5.2)
SODIUM BLD-SCNC: 135 MMOL/L (ref 136–145)
WBC # BLD: 5 K/UL (ref 4–11)

## 2020-11-20 PROCEDURE — 2580000003 HC RX 258: Performed by: SURGERY

## 2020-11-20 PROCEDURE — 6370000000 HC RX 637 (ALT 250 FOR IP): Performed by: NURSE PRACTITIONER

## 2020-11-20 PROCEDURE — 85027 COMPLETE CBC AUTOMATED: CPT

## 2020-11-20 PROCEDURE — APPSS15 APP SPLIT SHARED TIME 0-15 MINUTES: Performed by: NURSE PRACTITIONER

## 2020-11-20 PROCEDURE — 6360000002 HC RX W HCPCS: Performed by: SURGERY

## 2020-11-20 PROCEDURE — 94760 N-INVAS EAR/PLS OXIMETRY 1: CPT

## 2020-11-20 PROCEDURE — 6370000000 HC RX 637 (ALT 250 FOR IP): Performed by: SURGERY

## 2020-11-20 PROCEDURE — 36415 COLL VENOUS BLD VENIPUNCTURE: CPT

## 2020-11-20 PROCEDURE — 1200000000 HC SEMI PRIVATE

## 2020-11-20 PROCEDURE — 80048 BASIC METABOLIC PNL TOTAL CA: CPT

## 2020-11-20 PROCEDURE — 99024 POSTOP FOLLOW-UP VISIT: CPT | Performed by: SURGERY

## 2020-11-20 RX ADMIN — ENOXAPARIN SODIUM 40 MG: 40 INJECTION SUBCUTANEOUS at 08:14

## 2020-11-20 RX ADMIN — KETOROLAC TROMETHAMINE 15 MG: 15 INJECTION, SOLUTION INTRAMUSCULAR; INTRAVENOUS at 15:17

## 2020-11-20 RX ADMIN — SODIUM CHLORIDE: 9 INJECTION, SOLUTION INTRAVENOUS at 08:23

## 2020-11-20 RX ADMIN — OXYCODONE HYDROCHLORIDE 5 MG: 5 TABLET ORAL at 04:43

## 2020-11-20 RX ADMIN — LIOTHYRONINE SODIUM 10 MCG: 5 TABLET ORAL at 08:37

## 2020-11-20 RX ADMIN — IPRATROPIUM BROMIDE 2 SPRAY: 21 SPRAY NASAL at 21:25

## 2020-11-20 RX ADMIN — CETIRIZINE HYDROCHLORIDE 5 MG: 10 TABLET, FILM COATED ORAL at 08:14

## 2020-11-20 RX ADMIN — IPRATROPIUM BROMIDE 2 SPRAY: 21 SPRAY NASAL at 08:16

## 2020-11-20 RX ADMIN — Medication 10 ML: at 21:26

## 2020-11-20 RX ADMIN — BUPROPION HYDROCHLORIDE 150 MG: 150 TABLET, EXTENDED RELEASE ORAL at 08:14

## 2020-11-20 RX ADMIN — KETOROLAC TROMETHAMINE 15 MG: 15 INJECTION, SOLUTION INTRAMUSCULAR; INTRAVENOUS at 04:39

## 2020-11-20 RX ADMIN — KETOROLAC TROMETHAMINE 15 MG: 15 INJECTION, SOLUTION INTRAMUSCULAR; INTRAVENOUS at 21:24

## 2020-11-20 RX ADMIN — Medication 10 ML: at 08:16

## 2020-11-20 RX ADMIN — Medication 10 ML: at 15:17

## 2020-11-20 RX ADMIN — KETOROLAC TROMETHAMINE 15 MG: 15 INJECTION, SOLUTION INTRAMUSCULAR; INTRAVENOUS at 08:15

## 2020-11-20 ASSESSMENT — PAIN DESCRIPTION - PAIN TYPE
TYPE: SURGICAL PAIN

## 2020-11-20 ASSESSMENT — PAIN SCALES - GENERAL
PAINLEVEL_OUTOF10: 2
PAINLEVEL_OUTOF10: 3
PAINLEVEL_OUTOF10: 0

## 2020-11-20 ASSESSMENT — PAIN DESCRIPTION - ONSET
ONSET: ON-GOING
ONSET: ON-GOING

## 2020-11-20 ASSESSMENT — PAIN DESCRIPTION - ORIENTATION
ORIENTATION: MID;LOWER
ORIENTATION: MID
ORIENTATION: MID

## 2020-11-20 ASSESSMENT — PAIN - FUNCTIONAL ASSESSMENT: PAIN_FUNCTIONAL_ASSESSMENT: ACTIVITIES ARE NOT PREVENTED

## 2020-11-20 ASSESSMENT — PAIN DESCRIPTION - DESCRIPTORS
DESCRIPTORS: DISCOMFORT

## 2020-11-20 ASSESSMENT — PAIN DESCRIPTION - LOCATION
LOCATION: ABDOMEN

## 2020-11-20 ASSESSMENT — PAIN DESCRIPTION - PROGRESSION
CLINICAL_PROGRESSION: NOT CHANGED
CLINICAL_PROGRESSION: NOT CHANGED

## 2020-11-20 ASSESSMENT — PAIN DESCRIPTION - FREQUENCY
FREQUENCY: CONTINUOUS
FREQUENCY: CONTINUOUS
FREQUENCY: INTERMITTENT

## 2020-11-20 NOTE — PROGRESS NOTES
Admitted with sigmoid diverticulitis. Alert/oriented. Pleasant. Abdominal dressing in place is clean, dry, intact. Requested/received PRN 5 mg Oxy for pain. Takes medications whole with thins with no complications. Ambulates with stand by assist; does well. On room air. Bed is in lowest position, wheels locked, 2 side rails up, call light within reach. Will continue to monitor.

## 2020-11-20 NOTE — PROGRESS NOTES
[facility] [phone]                                     Daily Progress Note                                                         Pt Name: Jose G Arellano Record Number: [id number]  Date of Birth 1965   Today's Date: 11/20/2020  CC: post op    ASSESSMENT/PLAN  Recurrent diverticulitis  -POD # 2 11/18 open sigmoid resection with coloproctostomy  -pain controlled. PRN morphine, oxycodone. Continue scheduled toradol   -OOB, cough and deep breathe, IS, ambulate  -DVT prophy lovenox, SCDs  -stable post op. Awaiting return of GI function. Now passing flatus but no BM      Discharge Planning: continue inpatient      SUBJECTIVE  Fanta has improved from yesterday. Pain is well controlled. She has no nausea and no vomiting. She has not passed flatus and has not had a bowel movement. She is tolerating fulll liquids. OBJECTIVE  VITALS:  height is 5' 4\" (1.626 m) and weight is 174 lb 2.6 oz (79 kg). Her oral temperature is 97.8 °F (36.6 °C). Her blood pressure is 142/90 (abnormal) and her pulse is 93. Her respiration is 16 and oxygen saturation is 93%. INTAKE/OUTPUT:      Intake/Output Summary (Last 24 hours) at 11/20/2020 1226  Last data filed at 11/20/2020 1112  Gross per 24 hour   Intake 870 ml   Output 4650 ml   Net -3780 ml     GENERAL: alert, no distress  LUNGS: clear to ausculation, without wheezes, rales or rhonci  HEART: normal rate and regular rhythm  ABDOMEN: soft, appropriately-tender, incision staples intact  EXTREMITY: no cyanosis, clubbing or edema    I/O last 3 completed shifts:   In: 1110 [P.O.:1110]  Out: 3050 [Urine:3050]      LABS  Recent Labs     11/20/20  0601   WBC 5.0   HGB 11.0*   HCT 31.5*      *   K 4.2      CO2 26   BUN 7   CREATININE <0.5*   CALCIUM 8.3       EDUCATION  Patient educated about Disease Process, Medications, Smoking Cessation, Oxygenation, Incentive Spirometry and Deep

## 2020-11-20 NOTE — PROGRESS NOTES
Patient admitted to 3N d/t sigmoid diverticulitis. A&Ox4. Transfers without device x1 assist. On full liquid diet, tolerating well. Medications taken whole with thin liquids. On Lovenox for DVT prophylaxis. Pt has scattered bruising and surgical incision on mid-lower abdomen. On room air. Has been continent of bowel and bladder. Pt rates pain/discomfort 3/10. Chair/bed alarms in use and call light in reach. Will continue to monitor.

## 2020-11-20 NOTE — PLAN OF CARE
Problem: Falls - Risk of:  Goal: Will remain free from falls  Description: Will remain free from falls  Outcome: Ongoing  Note: Fall risk assessment completed. Fall precautions in place. Call light within reach. Pt educated on calling for assistance before getting up. Walkway free of clutter. Will continue to monitor. Problem: Pain:  Goal: Pain level will decrease  Description: Pain level will decrease  11/20/2020 1128 by Thurston Ahumada, RN  Outcome: Ongoing  Note: Pt assessed for pain. Pt in pain and assessed with 0-10 pain rating scale. Pt given prescribed analgesic for pain. (See eMar) Pt satisfied with pain relief thus far. Will reassess and continue to monitor. Problem: Skin Integrity:  Goal: Absence of new skin breakdown  Description: Absence of new skin breakdown  Outcome: Ongoing  Note: Patient's skin was assessed. Pt has scattered bruising and surgical incision on mid-lower abdomen. No new findings were noted. Patient is being educated on importance of turning/repostioning at least every two hours. RN will continue to educate and monitor.

## 2020-11-21 VITALS
WEIGHT: 174.16 LBS | OXYGEN SATURATION: 97 % | TEMPERATURE: 97.6 F | BODY MASS INDEX: 29.73 KG/M2 | DIASTOLIC BLOOD PRESSURE: 90 MMHG | SYSTOLIC BLOOD PRESSURE: 143 MMHG | HEIGHT: 64 IN | RESPIRATION RATE: 16 BRPM | HEART RATE: 96 BPM

## 2020-11-21 LAB
ANION GAP SERPL CALCULATED.3IONS-SCNC: 9 MMOL/L (ref 3–16)
BUN BLDV-MCNC: 8 MG/DL (ref 7–20)
CALCIUM SERPL-MCNC: 8.7 MG/DL (ref 8.3–10.6)
CHLORIDE BLD-SCNC: 104 MMOL/L (ref 99–110)
CO2: 28 MMOL/L (ref 21–32)
CREAT SERPL-MCNC: 0.6 MG/DL (ref 0.6–1.1)
GFR AFRICAN AMERICAN: >60
GFR NON-AFRICAN AMERICAN: >60
GLUCOSE BLD-MCNC: 100 MG/DL (ref 70–99)
HCT VFR BLD CALC: 33.3 % (ref 36–48)
HEMOGLOBIN: 11.3 G/DL (ref 12–16)
MCH RBC QN AUTO: 29.8 PG (ref 26–34)
MCHC RBC AUTO-ENTMCNC: 34 G/DL (ref 31–36)
MCV RBC AUTO: 87.6 FL (ref 80–100)
PDW BLD-RTO: 14.8 % (ref 12.4–15.4)
PLATELET # BLD: 301 K/UL (ref 135–450)
PMV BLD AUTO: 6.4 FL (ref 5–10.5)
POTASSIUM REFLEX MAGNESIUM: 4.1 MMOL/L (ref 3.5–5.1)
RBC # BLD: 3.8 M/UL (ref 4–5.2)
SODIUM BLD-SCNC: 141 MMOL/L (ref 136–145)
WBC # BLD: 4.2 K/UL (ref 4–11)

## 2020-11-21 PROCEDURE — 94760 N-INVAS EAR/PLS OXIMETRY 1: CPT

## 2020-11-21 PROCEDURE — 80048 BASIC METABOLIC PNL TOTAL CA: CPT

## 2020-11-21 PROCEDURE — 6360000002 HC RX W HCPCS: Performed by: SURGERY

## 2020-11-21 PROCEDURE — 2580000003 HC RX 258: Performed by: SURGERY

## 2020-11-21 PROCEDURE — 36415 COLL VENOUS BLD VENIPUNCTURE: CPT

## 2020-11-21 PROCEDURE — 6370000000 HC RX 637 (ALT 250 FOR IP): Performed by: SURGERY

## 2020-11-21 PROCEDURE — 99024 POSTOP FOLLOW-UP VISIT: CPT | Performed by: PHYSICIAN ASSISTANT

## 2020-11-21 PROCEDURE — APPNB30 APP NON BILLABLE TIME 0-30 MINS: Performed by: PHYSICIAN ASSISTANT

## 2020-11-21 PROCEDURE — APPSS15 APP SPLIT SHARED TIME 0-15 MINUTES: Performed by: PHYSICIAN ASSISTANT

## 2020-11-21 PROCEDURE — 99024 POSTOP FOLLOW-UP VISIT: CPT | Performed by: SURGERY

## 2020-11-21 PROCEDURE — 85027 COMPLETE CBC AUTOMATED: CPT

## 2020-11-21 RX ORDER — OXYCODONE HYDROCHLORIDE 5 MG/1
5 TABLET ORAL EVERY 4 HOURS PRN
Qty: 15 TABLET | Refills: 0 | Status: SHIPPED | OUTPATIENT
Start: 2020-11-21 | End: 2020-11-28

## 2020-11-21 RX ADMIN — BUPROPION HYDROCHLORIDE 150 MG: 150 TABLET, EXTENDED RELEASE ORAL at 09:17

## 2020-11-21 RX ADMIN — Medication 10 ML: at 09:20

## 2020-11-21 RX ADMIN — CETIRIZINE HYDROCHLORIDE 5 MG: 10 TABLET, FILM COATED ORAL at 09:17

## 2020-11-21 RX ADMIN — IPRATROPIUM BROMIDE 2 SPRAY: 21 SPRAY NASAL at 09:29

## 2020-11-21 RX ADMIN — KETOROLAC TROMETHAMINE 15 MG: 15 INJECTION, SOLUTION INTRAMUSCULAR; INTRAVENOUS at 09:19

## 2020-11-21 RX ADMIN — LIOTHYRONINE SODIUM 10 MCG: 5 TABLET ORAL at 09:19

## 2020-11-21 RX ADMIN — ENOXAPARIN SODIUM 40 MG: 40 INJECTION SUBCUTANEOUS at 09:17

## 2020-11-21 RX ADMIN — KETOROLAC TROMETHAMINE 15 MG: 15 INJECTION, SOLUTION INTRAMUSCULAR; INTRAVENOUS at 03:22

## 2020-11-21 ASSESSMENT — PAIN DESCRIPTION - FREQUENCY: FREQUENCY: INTERMITTENT

## 2020-11-21 ASSESSMENT — PAIN DESCRIPTION - PAIN TYPE: TYPE: SURGICAL PAIN

## 2020-11-21 ASSESSMENT — PAIN SCALES - GENERAL
PAINLEVEL_OUTOF10: 1
PAINLEVEL_OUTOF10: 2

## 2020-11-21 ASSESSMENT — PAIN DESCRIPTION - ORIENTATION: ORIENTATION: MID

## 2020-11-21 ASSESSMENT — PAIN DESCRIPTION - DESCRIPTORS: DESCRIPTORS: DISCOMFORT

## 2020-11-21 ASSESSMENT — PAIN DESCRIPTION - LOCATION: LOCATION: ABDOMEN

## 2020-11-21 ASSESSMENT — PAIN DESCRIPTION - PROGRESSION: CLINICAL_PROGRESSION: NOT CHANGED

## 2020-11-21 ASSESSMENT — PAIN - FUNCTIONAL ASSESSMENT: PAIN_FUNCTIONAL_ASSESSMENT: ACTIVITIES ARE NOT PREVENTED

## 2020-11-21 ASSESSMENT — PAIN DESCRIPTION - ONSET: ONSET: ON-GOING

## 2020-11-21 NOTE — PROGRESS NOTES
Patient is alert/oriented. Admitted with sigmoid diverticulitis. Abdomen has staples, open to air. Looks clean, dry, intact. No redness or swelling noted. On room air. Ambulates well. On scheduled Toradol, does not want any other pain medication this shift. Sleeping well overnight. Patient stated she is gassy, waiting to have BM. Bed in lowest position, wheels locked, 2 side rails up, call light within reach. Calls appropriately. Will continue to monitor.

## 2020-11-21 NOTE — PROGRESS NOTES
[facility] [phone]                                     Daily Progress Note                                                         Pt Name: Jose G Arellano Record Number: [id number]  Date of Birth 1965   Today's Date: 11/21/2020  CC: post op    ASSESSMENT/PLAN  Recurrent diverticulitis  -POD # 3 11/18 open sigmoid resection with coloproctostomy  -pain controlled. PRN morphine, oxycodone. Continue scheduled toradol. Has just been using Toradol and that is working well  -OOB, cough and deep breathe, IS, ambulate  -DVT prophy lovenox, SCDs  -Tolerating General Diet  -stable post op. Awaiting return of GI function.   -Passing flatus but no BM  -Possible D/C home late today. If she continues to tolerate diet, pain  Control, and her bowels continue to function. Discharge Planning: continue inpatient      SUBJECTIVE  Fanta has improved from yesterday. Pain is well controlled. She has no nausea and no vomiting. She has passed flatus and has not had a bowel movement. She is tolerating general diet     OBJECTIVE  VITALS:  height is 5' 4\" (1.626 m) and weight is 174 lb 2.6 oz (79 kg). Her oral temperature is 97.6 °F (36.4 °C). Her blood pressure is 143/90 (abnormal) and her pulse is 96. Her respiration is 16 and oxygen saturation is 97%. INTAKE/OUTPUT:      Intake/Output Summary (Last 24 hours) at 11/21/2020 0839  Last data filed at 11/21/2020 9940  Gross per 24 hour   Intake --   Output 4300 ml   Net -4300 ml     GENERAL: alert, no distress  LUNGS: clear to ausculation, without wheezes, rales or rhonci  HEART: normal rate and regular rhythm  ABDOMEN: soft, appropriately-tender, incision staples intact  EXTREMITY: no cyanosis, clubbing or edema    I/O last 3 completed shifts:   In: 120 [P.O.:120]  Out: 4425 [Urine:4425]      LABS  Recent Labs     11/21/20  0704   WBC 4.2   HGB 11.3*   HCT 33.3*         K 4.1    CO2 28   BUN 8   CREATININE 0.6   CALCIUM 8.7       EDUCATION  Patient educated about Disease Process, Medications, Smoking Cessation, Oxygenation, Incentive Spirometry and Deep Breath and Cough, Diabetes, Hyperlipidemia, Smoking Cessation, Nutrition, Exercise and Hypertension    Electronically signed by BERTA Michaels on 11/21/2020 at 8:39 AM      [facility]   [phone] Office  [phone] Cell     Surgery Staff  I have examined this patient and read and agree with the note by Porfirio Gayle PA-C from today. Doing great  Denies pain. Tolerating PO  Passing flatus  D/C home later today.   F/U Dr. Kadeem Keller 2 weeks      Electronically signed by Erick Lua MD on 11/21/2020 at 10:28 AM

## 2020-11-21 NOTE — PLAN OF CARE
Problem: Falls - Risk of:  Goal: Will remain free from falls  Description: Will remain free from falls  11/21/2020 1303 by Carlo Mendieta RN  Outcome: Completed     Problem: Falls - Risk of:  Goal: Absence of physical injury  Description: Absence of physical injury  Outcome: Completed     Problem: Pain:  Goal: Pain level will decrease  Description: Pain level will decrease  Outcome: Completed     Problem: Pain:  Goal: Control of acute pain  Description: Control of acute pain  11/21/2020 1303 by Carlo Mendieta RN  Outcome: Completed     Problem: Pain:  Goal: Control of chronic pain  Description: Control of chronic pain  Outcome: Completed     Problem: Skin Integrity:  Goal: Will show no infection signs and symptoms  Description: Will show no infection signs and symptoms  11/21/2020 1303 by Carlo Mendieta RN  Outcome: Completed     Problem: Skin Integrity:  Goal: Absence of new skin breakdown  Description: Absence of new skin breakdown  Outcome: Completed

## 2020-11-21 NOTE — PLAN OF CARE
Problem: Falls - Risk of:  Goal: Will remain free from falls  Description: Will remain free from falls  Outcome: Ongoing   Patient remained free of any falls this shift. Call light in reach at all times. Non skid footwear on. Patient encouraged to call for help when needed. Room free of clutter. Alarms on. Problem: Pain:  Goal: Control of acute pain  Description: Control of acute pain  Outcome: Ongoing   Pain assessed using 0-10 scale. Offer PRN medication as ordered by MD. Tahir Hilario 30 minutes after giving. Problem: Skin Integrity:  Goal: Will show no infection signs and symptoms  Description: Will show no infection signs and symptoms  Outcome: Ongoing   Patient is able to shift weight without assistance. Reposition every two hours. Skin assessed every shift. No new area of breakdown. Skin warm and dry to touch.

## 2020-11-23 ENCOUNTER — TELEPHONE (OUTPATIENT)
Dept: FAMILY MEDICINE CLINIC | Age: 55
End: 2020-11-23

## 2020-11-23 ENCOUNTER — CARE COORDINATION (OUTPATIENT)
Dept: CASE MANAGEMENT | Age: 55
End: 2020-11-23

## 2020-11-23 RX ORDER — LEVOCETIRIZINE DIHYDROCHLORIDE 5 MG/1
TABLET, FILM COATED ORAL
Qty: 90 TABLET | Refills: 1 | Status: SHIPPED | OUTPATIENT
Start: 2020-11-23 | End: 2021-06-30

## 2020-11-23 RX ORDER — BUPROPION HYDROCHLORIDE 150 MG/1
TABLET ORAL
Qty: 90 TABLET | Refills: 0 | Status: SHIPPED | OUTPATIENT
Start: 2020-11-23 | End: 2021-03-04

## 2020-11-23 NOTE — CARE COORDINATION
[facility] Transitions Initial Follow Up Call    Call within 2 business days of discharge: Yes    Patient: Luis Subramanian Patient : 1965   MRN: [id number]  Reason for Admission: sigmoid diverticulitis  Discharge Date: 20 RARS: Readmission Risk Score: 12      Last Discharge [facility]       Complaint Diagnosis Description Type Department Provider    20  Sigmoid diverticulitis . .. Admission (Discharged) Светлана Renee MD        Challenges to be reviewed by the provider   Additional needs identified to be addressed with provider No      Discussed COVID-19 related testing which was available at this time. Test results were negative. Patient informed of results, if available? Yes         Method of communication with provider : none      Was this a readmission? No    Care Transition Nurse (CTN) contacted the patient by telephone to perform post hospital discharge assessment. Verified name and  with patient as identifiers. Provided introduction to self, and explanation of the CTN role. CTN reviewed discharge instructions, medical action plan and red flags with patient who verbalized understanding. Patient given an opportunity to ask questions and does not have any further questions or concerns at this time. Were discharge instructions available to patient? Yes. Reviewed appropriate site of care based on symptoms and resources available to patient including: PCP and Specialist. The parent agrees to contact the PCP office for questions related to their healthcare. Medication reconciliation was performed with patient, who verbalizes understanding of administration of home medications. Advised obtaining a 90-day supply of all daily and as-needed medications. Discussed follow-up appointments. If no appointment was previously scheduled, appointment scheduling offered: NA has appt. Is follow up appointment scheduled within 7 days of discharge?  No  Non[facility] follow up appointment(s):     Plan for follow-up call in 7-10 days based on severity of symptoms and risk factors. Facility: [facility]    Non-face-to-face services provided:  Obtained and reviewed discharge summary and/or continuity of care documents    Care Transitions 24 Hour Call    Do you have all of your prescriptions and are they filled?:  Yes  Care Transitions Interventions     Attempted to reach patient via phone for initial post hospital transition call. VM left stating purpose of call along with my contact information requesting a return call. Patient Fanta returned phone call. She stated was sore and was not taking prescribed pain medication, stating she did not like to take pain medication. She stated eating, drinking, urinating and had a BM without any issues. Will hand off to AC.         Follow Up  Future Appointments   Date Time Provider Vicenta Falk   11/30/2020 10:30 AM Brooke Rocha MD [facility]   3/16/2021  3:45 PM MD Salima Chang, RN

## 2020-11-23 NOTE — TELEPHONE ENCOUNTER
[facility] Transitions Initial Follow Up Call    Outreach made within 2 business days of discharge: Yes    Patient: Tonya Vyas Patient : 1965   MRN: [id number]  Reason for Admission: There are no discharge diagnoses documented for the most recent discharge.   Discharge Date: 20       Spoke with: left voicemail    Discharge department/facility: [facility]    Scheduled appointment with PCP within 7-14 days    Follow Up  Future Appointments   Date Time Provider Vicneta Falk   2020 10:30 AM Edmund Kapoor MD [facility]   3/16/2021  3:45 PM Eric Almodovar MD Kenw Derm LUCIE Coreas MA

## 2020-11-30 ENCOUNTER — CARE COORDINATION (OUTPATIENT)
Dept: OTHER | Facility: CLINIC | Age: 55
End: 2020-11-30

## 2020-11-30 ENCOUNTER — OFFICE VISIT (OUTPATIENT)
Dept: SURGERY | Age: 55
End: 2020-11-30

## 2020-11-30 PROCEDURE — 99024 POSTOP FOLLOW-UP VISIT: CPT | Performed by: SURGERY

## 2020-11-30 NOTE — CARE COORDINATION
[facility] Transitions Follow Up Call    2020    Patient: Shivam Johnson  Patient : 1965   MRN: [id number]  Reason for Admission: Sigmoid Diverticulitis   Discharge Date: 20 RARS: Readmission Risk Score: 12         Spoke with: melyssa PORTER attempted to reach patient for Care Transitions call. HIPAA compliant message left requesting a return phone call at patients convenience. Will continue to follow.      Follow Up  Future Appointments   Date Time Provider Vicenta Falk   2020 10:30 AM Walter Handley MD [facility]   3/16/2021  3:45 PM MD Nicolle Noe [facility]       Amalia Leon RN

## 2020-11-30 NOTE — DISCHARGE SUMMARY
General Surgery Discharge Summary        Vince Negrete   : 1965 MRN: [id number]  Date of Admission: 2020  Date of Discharge: 2020  Admitting Criselda Duran MD  Primary Care Physician: Blayne Sequeira MD  Summary by: Adelfo Goss    Diagnosis Present on Admission:   Sigmoid diverticulitis      Secondary diagnosis:   Patient Active Problem List   Diagnosis    Hypercholesteremia due to HDL > 100    Low testosterone level in female    Elevated C-reactive protein (CRP)    Primary osteoarthritis of first carpometacarpal joint of left hand    Family history of Alzheimer's disease    Eczema    Acquired underactive thyroid    Reactive airway disease    Chronic allergic rhinitis    Eustachian catarrh, bilateral    Allergic sinusitis    Neuritis    Vasomotor rhinitis    Diverticulitis    Trigger middle finger of right hand    Sigmoid diverticulitis     Procedures: Procedure(s):  SIGMOID COLECTOMY  Summary of hospital stay:   Vince Negrete is a 54 y.o. female admitted for a sigmoid resection. She had previous bouts of diverticulitis, and was recently admitted with an abscess, which resolved on antibiotics. Given the recurrent nature of the disease as well as progression to now perforation and abscess, she elected to proceed with elective sigmoid resection. The risks, benefits, and alternatives were reviewed, and she agreed to proceed. She was taken to the OR where an open sigmoid resection with coloproctostomy was carried out. She tolerated the procedure well and was transferred to recovery in stable condition. In the postoperative period she did well. As her bowel function returned her diet was advanced and tolerated. Her pain was monitored and controlled and she was ambulating well. She did well throughout her hospital stay and was discharged home in stable condition.    Discharge Medications:  Discharge Medication List as of 2020 12:47 PM      START taking these activity as tolerated    Follow up:  Dr. Sia Cortes in 1-2 weeks    Electronically signed by Magdaleno Siemens, PA-C on 11/30/2020 at 12:16 PM

## 2020-11-30 NOTE — PROGRESS NOTES
Subjective:      Patient ID: Vicenta Pierre is a 54 y.o. female. HPI  Patient presents s/p sigmoid resection for diverticulitis. Patient is two weeks post op. Pain level is minor, controlled with Ibuprofren. Incision appearance: well healed, staples out today. Post op complications: none. Pathology report reviewed with patient and showed diverticulitis. Follow up in three weeks. Review of Systems    Objective:   Physical Exam    Assessment:       Diagnosis Orders   1.  Diverticulitis             Plan:      Follow up in three weeks        Sima Puentes MD

## 2020-12-01 ENCOUNTER — TELEPHONE (OUTPATIENT)
Dept: SURGERY | Age: 55
End: 2020-12-01

## 2020-12-03 ENCOUNTER — CARE COORDINATION (OUTPATIENT)
Dept: OTHER | Facility: CLINIC | Age: 55
End: 2020-12-03

## 2020-12-03 NOTE — CARE COORDINATION
[facility] Transitions Follow Up Call    12/3/2020    Patient: Na Stephens  Patient : 1965   MRN: [id number]  Reason for Admission: Diverticulitis  Discharge Date: 20 RARS: Readmission Risk Score: 12      Needs to be reviewed by the provider   Additional needs identified to be addressed with provider No  none       Method of communication with provider : none    Care Transition Nurse (CTN) contacted the patient by telephone to follow up after admission on 20. Verified name and  with patient as identifiers. Addressed changes since last contact: symptom management-Fanta reports she is pain free, her staples have been removed and she is healing well. BM's per baseline. and follow up appointment-Had f/u on , no questions at this time. Discharged needs reviewed: none  Follow up appointment completed? Yes    CTN reviewed discharge instructions, medical action plan and red flags with patient and discussed any barriers to care and/or understanding of plan of care after discharge. Discussed appropriate site of care based on symptoms and resources available to patient including: PCP, Specialist, Benefits related nurse triage line, Urgent care clinics, [employer] and Condition related references. The patient agrees to contact the PCP office for questions related to their healthcare. Patients top risk factors for readmission: medical condition  Interventions to address risk factors: Obtained and reviewed discharge summary and/or continuity of care documents, Education of patient/family/caregiver/guardian to support self-management-patient is independent in ADL's, gets assitance from family as needed due to lifting restrictions. and Assessment and support for treatment adherence and medication management-no questions or financial conerns at this time    Discussed follow-up appointments. If no appointment was previously scheduled, appointment scheduling offered: n/a appt completed.   Is follow up appointment scheduled within 7 days of discharge? No  Non-[facility] follow up appointment(s): n/a    Patient denies Elva Nissen or concerns at this time no additoinal outreaches based on severity of symptoms and risk factors. CTN provided contact information for future needs. Care Transitions Subsequent and Final Call    Subsequent and Final Calls  Do you have any ongoing symptoms?:  No  Have your medications changed?:  No  Do you have any questions related to your medications?:  No  Do you currently have any active services?:  No  Do you have any needs or concerns that I can assist you with?:  No  Identified Barriers:  None  Care Transitions Interventions  Other Interventions:             Follow Up  Future Appointments   Date Time Provider Vicenta Falk   12/21/2020 10:30 AM Shahrzad Brink MD [id number]   3/16/2021  3:45 PM MD Di Carpenter RN

## 2020-12-21 ENCOUNTER — OFFICE VISIT (OUTPATIENT)
Dept: SURGERY | Age: 55
End: 2020-12-21

## 2020-12-21 PROCEDURE — 99024 POSTOP FOLLOW-UP VISIT: CPT | Performed by: SURGERY

## 2020-12-21 NOTE — PROGRESS NOTES
Subjective:      Patient ID: Frantz Leonard is a 54 y.o. female. HPI  Patient presents s/p sigmoid resection for diverticulitis. Patient is four weeks post op. Pain level is minor. Incision appearance: well healed. Post op complications: none. Pathology report reviewed with patient and showed diverticulitis. Follow up in two weeks to assess return to work. Review of Systems    Objective:   Physical Exam    Assessment:       Diagnosis Orders   1.  Diverticulitis             Plan:      Follow up in two weeks        Osman Dupont MD

## 2021-01-04 ENCOUNTER — OFFICE VISIT (OUTPATIENT)
Dept: SURGERY | Age: 56
End: 2021-01-04

## 2021-01-04 DIAGNOSIS — K57.92 DIVERTICULITIS: Primary | ICD-10-CM

## 2021-01-04 PROCEDURE — 99024 POSTOP FOLLOW-UP VISIT: CPT | Performed by: SURGERY

## 2021-01-04 NOTE — PROGRESS NOTES
Fanta Caldwell (:  1965) is a 54 y.o. female,Established patient, here for evaluation of the following chief complaint(s):  Post-Op Check (Pt is here today for a 2 week followup visit. )      ASSESSMENT/PLAN:  1. Diverticulitis      Follow up with me as needed      SUBJECTIVE/OBJECTIVE:  HPI  Doing well overall from sigmoid resection. Nearly two months postop. Planning return to work in another ten days. No issues with pain or bowel function. Follow up with me as needed    Review of Systems    Physical Exam      Electronically signed by Dot Solrozano MD on 2021 at 1:27 PM        An electronic signature was used to authenticate this note.     --Dot Solorzano MD

## 2021-01-29 DIAGNOSIS — M79.2 NEURITIS: ICD-10-CM

## 2021-01-29 DIAGNOSIS — N95.1 MENOPAUSAL VASOMOTOR SYNDROME: ICD-10-CM

## 2021-02-01 RX ORDER — GABAPENTIN 100 MG/1
CAPSULE ORAL
Qty: 360 CAPSULE | Refills: 0 | Status: SHIPPED | OUTPATIENT
Start: 2021-02-01 | End: 2021-03-01

## 2021-02-17 ENCOUNTER — HOSPITAL ENCOUNTER (OUTPATIENT)
Dept: WOMENS IMAGING | Age: 56
Discharge: HOME | End: 2021-02-17
Payer: COMMERCIAL

## 2021-02-17 DIAGNOSIS — Z12.31 VISIT FOR SCREENING MAMMOGRAM: ICD-10-CM

## 2021-02-17 PROCEDURE — 77063 BREAST TOMOSYNTHESIS BI: CPT

## 2021-03-01 ENCOUNTER — OFFICE VISIT (OUTPATIENT)
Dept: GYNECOLOGY | Age: 56
End: 2021-03-01
Payer: COMMERCIAL

## 2021-03-01 VITALS
TEMPERATURE: 97 F | RESPIRATION RATE: 17 BRPM | BODY MASS INDEX: 32.1 KG/M2 | HEART RATE: 79 BPM | SYSTOLIC BLOOD PRESSURE: 138 MMHG | HEIGHT: 64 IN | DIASTOLIC BLOOD PRESSURE: 98 MMHG | WEIGHT: 188 LBS

## 2021-03-01 DIAGNOSIS — Z01.419 WELL WOMAN EXAM WITH ROUTINE GYNECOLOGICAL EXAM: Primary | ICD-10-CM

## 2021-03-01 PROCEDURE — 99386 PREV VISIT NEW AGE 40-64: CPT | Performed by: OBSTETRICS & GYNECOLOGY

## 2021-03-01 RX ORDER — CLONIDINE HYDROCHLORIDE 0.1 MG/1
0.1 TABLET ORAL DAILY
Qty: 90 TABLET | Refills: 3 | Status: SHIPPED | OUTPATIENT
Start: 2021-03-01 | End: 2021-05-24

## 2021-03-01 ASSESSMENT — ENCOUNTER SYMPTOMS
GASTROINTESTINAL NEGATIVE: 1
EYES NEGATIVE: 1
RESPIRATORY NEGATIVE: 1

## 2021-03-02 NOTE — PROGRESS NOTES
Subjective:      Patient ID: Yen Ray is a 64 y.o. female. Patient is here for annual. Patient with hot flashes. Patient wants to know treatment options. Gynecologic Exam        Review of Systems   Constitutional: Negative. HENT: Negative. Eyes: Negative. Respiratory: Negative. Cardiovascular: Negative. Gastrointestinal: Negative. Genitourinary: Negative. Musculoskeletal: Negative. Skin: Negative. Neurological: Negative. Psychiatric/Behavioral: Negative.       Date of Birth 1965  Past Medical History:   Diagnosis Date    Abnormal Pap smear of cervix     Acquired underactive thyroid 2017    Asthma     when have colds goes into asthma    Chronic allergic rhinitis 4/3/2018    Diverticulitis 2006    colon 2006    Family history of Alzheimer's disease 11/15/2016    Fibrocystic breast     Hypercholesteremia due to HDL > 100 10/17/2013    no meds    Irregular uterine bleeding     Menopause ovarian failure     Primary osteoarthritis of first carpometacarpal joint of left hand 2015     Past Surgical History:   Procedure Laterality Date    BREAST BIOPSY      COLONOSCOPY      x2   Mariel Rivera liposuction-abdomen and thighs    EYE SURGERY      corrective    FINGER TRIGGER RELEASE Left 16    Middle Finger    HAND SURGERY Left 14    Middle Finger Ulnar Digital Nerve contusion and neuropraxia    SMALL INTESTINE SURGERY N/A 2020    SIGMOID COLECTOMY performed by Marcial Gonzalez MD at [place] [address]     OB History    Para Term  AB Living   0 0 0 0 0 0   SAB TAB Ectopic Molar Multiple Live Births   0 0 0 0 0 0     Social History     Socioeconomic History    Marital status: Single     Spouse name: Not on file    Number of children: 0    Years of education: Not on file    Highest education level: Not on file   Occupational History    Occupation: Physical Therapist     Employer: [employer] Comment: [address], Financial resource strain: Not on file    Food insecurity     Worry: Not on file     Inability: Not on file    Transportation needs     Medical: Not on file     Non-medical: Not on file   Tobacco Use    Smoking status: Never Smoker    Smokeless tobacco: Never Used    Tobacco comment: congrats   Substance and Sexual Activity    Alcohol use: Yes     Alcohol/week: 5.0 standard drinks     Types: 5 Standard drinks or equivalent per week     Frequency: Patient refused     Comment: socially    Drug use: Never    Sexual activity: Yes     Partners: Male     Birth control/protection: Condom   Lifestyle    Physical activity     Days per week: Not on file     Minutes per session: Not on file    Stress: Not on file   Relationships    Social connections     Talks on phone: Not on file     Gets together: Not on file     Attends Jew service: Not on file     Active member of club or organization: Not on file     Attends meetings of clubs or organizations: Not on file     Relationship status: Not on file    Intimate partner violence     Fear of current or ex partner: Not on file     Emotionally abused: Not on file     Physically abused: Not on file     Forced sexual activity: Not on file   Other Topics Concern    Not on file   Social History Narrative    Self-breast exams: yes. Exercise: walking and cardiovascular equipment three times a week.   Seatbelt use: Always    Physical therapist     Allergies   Allergen Reactions    Other Itching     thermersol-preservative for contact solution-pt states got itchy eyes    Penicillins Hives    Nickel Rash     Outpatient Medications Marked as Taking for the 3/1/21 encounter (Office Visit) with Madai Kaur MD   Medication Sig Dispense Refill    cloNIDine (CATAPRES) 0.1 MG tablet Take 1 tablet by mouth daily 90 tablet 3    levocetirizine (XYZAL) 5 MG tablet TAKE ONE TABLET BY MOUTH NIGHTLY 90 tablet 1    buPROPion (WELLBUTRIN XL) 150 MG extended release tablet TAKE ONE TABLET BY MOUTH EVERY MORNING 90 tablet 0    Dextromethorphan-guaiFENesin (MUCINEX DM MAXIMUM STRENGTH)  MG TB12 Take 1 tablet by mouth every 12 hours as needed (cough/congestion)       ELDERBERRY PO Take 1 tablet by mouth daily gummie      liothyronine (CYTOMEL) 5 MCG tablet TAKE TWO TABLETS BY MOUTH EVERY  tablet 1    ipratropium (ATROVENT) 0.03 % nasal spray INSTILL 2 SPRAYS IN EACH NOSTRIL EVERY 12 HOURS (Patient taking differently: 2 sprays by Nasal route as needed ) 30 mL 3    Multiple Vitamin (MULTIVITAMINS PO) Take 1 tablet by mouth daily       Ascorbic Acid (VITAMIN C) 500 MG tablet Take 500 mg by mouth daily.  magnesium 30 MG tablet Take 400 mg by mouth daily       zinc gluconate 50 MG tablet Take 50 mg by mouth daily. Family History   Problem Relation Age of Onset    Cancer Mother         uterine sarcoma, skin non-melanoma    Stroke Mother     Alzheimer's Disease Mother     Cancer Father         skin non-melanoma    Alzheimer's Disease Father      BP (!) 138/98 (Site: Right Upper Arm, Position: Sitting, Cuff Size: Large Adult)   Pulse 79   Temp 97 °F (36.1 °C)   Resp 17   Ht 5' 4\" (1.626 m)   Wt 188 lb (85.3 kg)   LMP 12/15/2015   BMI 32.27 kg/m²       Objective:   Physical Exam  Constitutional:       General: She is not in acute distress. Appearance: Normal appearance. She is well-developed and normal weight. She is not diaphoretic. HENT:      Head: Normocephalic and atraumatic. Nose: Nose normal.      Mouth/Throat:      Mouth: Mucous membranes are moist.      Pharynx: Oropharynx is clear. Eyes:      Pupils: Pupils are equal, round, and reactive to light. Neck:      Musculoskeletal: Normal range of motion and neck supple. No neck rigidity. Thyroid: No thyromegaly. Cardiovascular:      Rate and Rhythm: Normal rate and regular rhythm. Heart sounds: Normal heart sounds. No murmur. No friction rub. No gallop. Pulmonary:      Effort: Pulmonary effort is normal. No respiratory distress. Breath sounds: Normal breath sounds. No wheezing or rales. Chest:      Breasts:         Right: Normal. No swelling, bleeding, inverted nipple, mass, nipple discharge, skin change or tenderness. Left: Normal. No swelling, bleeding, inverted nipple, mass, nipple discharge, skin change or tenderness. Abdominal:      General: Abdomen is flat. Bowel sounds are normal. There is no distension. Palpations: Abdomen is soft. There is no hepatomegaly or mass. Tenderness: There is no abdominal tenderness. There is no guarding or rebound. Hernia: No hernia is present. There is no hernia in the left inguinal area. Genitourinary:     General: Normal vulva. Exam position: Lithotomy position. Pubic Area: No rash. Labia:         Right: No rash, tenderness, lesion or injury. Left: No rash, tenderness, lesion or injury. Urethra: No prolapse, urethral pain, urethral swelling or urethral lesion. Vagina: Normal. No signs of injury and foreign body. No vaginal discharge, erythema, tenderness or bleeding. Cervix: No cervical motion tenderness, discharge, friability, lesion, erythema, cervical bleeding or eversion. Uterus: Not deviated, not enlarged, not fixed, not tender and no uterine prolapse. Adnexa:         Right: No mass, tenderness or fullness. Left: No mass, tenderness or fullness. Rectum: Normal. Guaiac result negative. No mass, tenderness, anal fissure, external hemorrhoid or internal hemorrhoid. Normal anal tone. Comments: Normal urethral meatus, nl urethra, nl bladder. Musculoskeletal: Normal range of motion. General: No tenderness. Lymphadenopathy:      Cervical: No cervical adenopathy. Lower Body: No right inguinal adenopathy. No left inguinal adenopathy. Skin:     General: Skin is warm and dry. Findings: No erythema or rash. Neurological:      General: No focal deficit present. Mental Status: She is alert and oriented to person, place, and time. Deep Tendon Reflexes: Reflexes are normal and symmetric. Psychiatric:         Mood and Affect: Mood normal.         Behavior: Behavior normal.         Thought Content: Thought content normal.         Judgment: Judgment normal.         Assessment:      1. Annual  2. menopause      Plan:      1. Pap, calcium, exercise, mammogram, hemocult negative  2. Hot flashes-discussed with patient about EPO. Could try Clonidine 0.1 mg daily if EPO does not work. Call if both does not work.          Carter Bolivar MD

## 2021-03-04 DIAGNOSIS — F41.9 ANXIETY: ICD-10-CM

## 2021-03-04 RX ORDER — BUPROPION HYDROCHLORIDE 150 MG/1
TABLET ORAL
Qty: 90 TABLET | Refills: 0 | Status: SHIPPED | OUTPATIENT
Start: 2021-03-04 | End: 2021-05-24

## 2021-03-11 ENCOUNTER — PATIENT MESSAGE (OUTPATIENT)
Dept: GYNECOLOGY | Age: 56
End: 2021-03-11

## 2021-03-12 RX ORDER — CLONIDINE HYDROCHLORIDE 0.1 MG/1
0.1 TABLET ORAL DAILY
Qty: 90 TABLET | Refills: 3 | Status: SHIPPED | OUTPATIENT
Start: 2021-03-12 | End: 2022-03-07

## 2021-03-12 NOTE — TELEPHONE ENCOUNTER
From: Yen Ray  To: Angi Wright MD  Sent: 3/11/2021 7:43 PM EST  Subject: Prescription Question    I've tried the evening primrose and flaxseed oil for my hot flashes for almost two weeks and it isn't working. I am now getting more hot flashes during the day at work which is really annoying with face masks. Can you send in the Rx for the catapres as I called [facility] and they said if I fax the Rx it invalidates the prescription. They said I could take it to [employer] and get 30 days but that will be more expensive that the 90 day mail order. Thank you I really appreciate it.  These hot flashes at night were problematic for my sleep but the daytime flashes are horrible when I'm in a patients room and I am sweating more during their therapy then they are :)

## 2021-04-18 NOTE — PROGRESS NOTES
[facility]) Dermatology  Sushma MoratayaInderjitmarysirena      [facility]  1965    64 y.o. female     Date of Visit: 4/20/2021    Last Visit: 1yr    Chief Complaint: Skin check    History of Present Illness:  1. Here for skin/mole check. No new moles. No moles changing in size, shape, color. No moles associated w/ pain, bleeding, pruritus.   -Wears SPF 30+ sunscreen regularly. 2. History of actinic keratoses s/p cryotherapy. 3. F/u dermatitis. Current regimen - lidex oint. Reports current flare on elbow and R knee. Has been using TCS continuously for several months straight w/o clearance. -Moisturizes daily     Review of Systems:  Constitutional: Reports general sense of well-being. Skin: No interval severe sunburns. Allergies: Reviewed and updated. Past Medical History, Surgical History, Medications and Allergies reviewed.      Past Medical History:   Diagnosis Date    Abnormal Pap smear of cervix     Acquired underactive thyroid 4/20/2017    Asthma     when have colds goes into asthma    Chronic allergic rhinitis 4/3/2018    Diverticulitis 2006    colon 2006    Family history of Alzheimer's disease 11/15/2016    Fibrocystic breast     Hypercholesteremia due to HDL > 100 10/17/2013    no meds    Irregular uterine bleeding     Menopause ovarian failure     Primary osteoarthritis of first carpometacarpal joint of left hand 9/25/2015     Past Surgical History:   Procedure Laterality Date    BREAST BIOPSY      COLONOSCOPY      x2   Stan Vang Done liposuction-abdomen and thighs    EYE SURGERY      corrective    FINGER TRIGGER RELEASE Left 5/19/16    Middle Finger    HAND SURGERY Left 5/8/14    Middle Finger Ulnar Digital Nerve contusion and neuropraxia    SMALL INTESTINE SURGERY N/A 11/18/2020    SIGMOID COLECTOMY performed by Demetrice Nino MD at [address] [place] [address]   Allergen Reactions    Other Itching     thermersol-preservative

## 2021-04-20 ENCOUNTER — OFFICE VISIT (OUTPATIENT)
Dept: DERMATOLOGY | Age: 56
End: 2021-04-20
Payer: COMMERCIAL

## 2021-04-20 VITALS — TEMPERATURE: 98.2 F

## 2021-04-20 DIAGNOSIS — L57.0 ACTINIC KERATOSES: Primary | ICD-10-CM

## 2021-04-20 DIAGNOSIS — Z12.83 SCREENING EXAM FOR SKIN CANCER: ICD-10-CM

## 2021-04-20 DIAGNOSIS — L30.9 DERMATITIS: ICD-10-CM

## 2021-04-20 DIAGNOSIS — Z85.828 HISTORY OF NONMELANOMA SKIN CANCER: ICD-10-CM

## 2021-04-20 PROCEDURE — 99214 OFFICE O/P EST MOD 30 MIN: CPT | Performed by: DERMATOLOGY

## 2021-04-20 PROCEDURE — 17003 DESTRUCT PREMALG LES 2-14: CPT | Performed by: DERMATOLOGY

## 2021-04-20 PROCEDURE — 17000 DESTRUCT PREMALG LESION: CPT | Performed by: DERMATOLOGY

## 2021-05-02 DIAGNOSIS — E03.9 ACQUIRED UNDERACTIVE THYROID: ICD-10-CM

## 2021-05-03 DIAGNOSIS — J30.0 VASOMOTOR RHINITIS: ICD-10-CM

## 2021-05-03 RX ORDER — LIOTHYRONINE SODIUM 5 UG/1
TABLET ORAL
Qty: 180 TABLET | Refills: 0 | Status: SHIPPED | OUTPATIENT
Start: 2021-05-03 | End: 2021-08-05

## 2021-05-04 RX ORDER — IPRATROPIUM BROMIDE 21 UG/1
SPRAY, METERED NASAL
Qty: 1 BOTTLE | Refills: 3 | Status: SHIPPED | OUTPATIENT
Start: 2021-05-04 | End: 2022-03-07

## 2021-05-12 ENCOUNTER — TELEPHONE (OUTPATIENT)
Dept: FAMILY MEDICINE CLINIC | Age: 56
End: 2021-05-12

## 2021-05-12 NOTE — TELEPHONE ENCOUNTER
Pt is rescheduled on 5/17/21 wants to know can we fill out her be well within form at that appointment ?

## 2021-05-23 DIAGNOSIS — F41.9 ANXIETY: ICD-10-CM

## 2021-05-24 ENCOUNTER — OFFICE VISIT (OUTPATIENT)
Dept: FAMILY MEDICINE CLINIC | Age: 56
End: 2021-05-24
Payer: COMMERCIAL

## 2021-05-24 VITALS
WEIGHT: 189 LBS | DIASTOLIC BLOOD PRESSURE: 78 MMHG | HEART RATE: 78 BPM | BODY MASS INDEX: 31.49 KG/M2 | OXYGEN SATURATION: 99 % | HEIGHT: 65 IN | SYSTOLIC BLOOD PRESSURE: 120 MMHG | RESPIRATION RATE: 16 BRPM

## 2021-05-24 DIAGNOSIS — Z00.00 ANNUAL PHYSICAL EXAM: ICD-10-CM

## 2021-05-24 DIAGNOSIS — D17.1 LIPOMA OF TORSO: ICD-10-CM

## 2021-05-24 DIAGNOSIS — N95.1 MENOPAUSAL HOT FLUSHES: ICD-10-CM

## 2021-05-24 DIAGNOSIS — E78.00 HYPERCHOLESTEREMIA: ICD-10-CM

## 2021-05-24 DIAGNOSIS — F41.9 ANXIETY: ICD-10-CM

## 2021-05-24 DIAGNOSIS — E03.9 ACQUIRED UNDERACTIVE THYROID: Primary | ICD-10-CM

## 2021-05-24 PROCEDURE — 99214 OFFICE O/P EST MOD 30 MIN: CPT | Performed by: FAMILY MEDICINE

## 2021-05-24 RX ORDER — BUPROPION HYDROCHLORIDE 150 MG/1
TABLET ORAL
Qty: 90 TABLET | Refills: 0 | Status: SHIPPED | OUTPATIENT
Start: 2021-05-24 | End: 2021-08-30

## 2021-05-24 RX ORDER — GABAPENTIN 100 MG/1
400 CAPSULE ORAL NIGHTLY
Qty: 30 CAPSULE | Refills: 0 | COMMUNITY
Start: 2021-05-24 | End: 2022-01-04 | Stop reason: ALTCHOICE

## 2021-05-24 ASSESSMENT — PATIENT HEALTH QUESTIONNAIRE - PHQ9
SUM OF ALL RESPONSES TO PHQ QUESTIONS 1-9: 0
SUM OF ALL RESPONSES TO PHQ QUESTIONS 1-9: 0
2. FEELING DOWN, DEPRESSED OR HOPELESS: 0

## 2021-05-24 NOTE — PATIENT INSTRUCTIONS
INSTRUCTIONS  NEXT APPOINTMENT: Please schedule fasting annual physical (30 minutes) in one year. OK to have water, black coffee and medications (except for diabetes medicines). · PLEASE TAKE THIS FORM TO CHECK-OUT WINDOW TO SCHEDULE NEXT VISIT. PLEASE GET FASTING BLOODWORK DRAWN SOON. Lab is on first floor in [address]. Hours Monday to Friday 7 AM to 5 PM.   · Please get flu vaccine when available in fall. Can get either at this office or at stores such as [employer] and [address]. Patient Education       LIPOMA     Definition      A lipoma is a slow-growing, fatty lump that's most often situated between your skin and the underlying muscle layer. Often a lipoma is easy to identify because it moves readily with slight finger pressure. It's doughy to touch and usually not tender. You may have more than one lipoma. Lipomas can occur at any age, but they're most often detected during middle age. A lipoma isn't cancer and is usually harmless. Treatment generally isn't necessary, but if the lipoma is in a location that bothers you, is painful or is growing, you may want to have it removed. Symptom    Lipomas are:   Located just under your skin. They commonly occur in the neck, shoulders, back, abdomen, arms and thighs. Soft and doughy to the touch. They also move easily with slight finger pressure. Generally small. Lipomas are typically less than 2 inches (5 centimeters) in diameter, but they can grow larger. Sometimes painful. Lipomas can be painful if they grow and press on nearby nerves, or if they contain many blood vessels. Causes  The exact cause of lipomas is unknown. Lipomas tend to run in families, so genetic factors likely play a role in their development. Risk factors  Several factors may increase your risk of developing a lipoma, including:   Being between 36and 61years old. Although lipomas can occur at any age, they're most common in this age group.  Lipomas are rare in children. Having certain other disorders. People with other disorders, including adiposis dolorosa, Madelung disease, Vrakoesp-Cocpl-Bbavqbnvy syndrome, Cowden syndrome and Duran syndrome have an increased risk of multiple lipomas. Tests and diagnosis  To diagnose a lipoma, your doctor may give you:   A physical exam   A tissue sample removal (biopsy) for lab examination   An ultrasound or other imaging test, such as an MRI or CT scan, if the lipoma is large, has unusual features or appears to be deeper than the fatty tissue   There's a very small chance that a lump resembling a lipoma may actually be a form of cancer called liposarcoma. Liposarcomas -- cancerous tumors in fatty tissues -- grow rapidly, don't move under the skin and are usually painful. A biopsy, MRI or CT scan is typically done if your doctor suspects liposarcoma. Treatments and drugs  No treatment is usually necessary for a lipoma. However, if the lipoma is in a location that bothers you, is painful or is growing, your doctor might recommend that it be removed. Lipoma treatments include:   Surgical removal. Most lipomas are removed surgically by cutting them out. Recurrences after removal are uncommon. Steroid injections. This treatment shrinks the lipoma, but usually doesn't completely eliminate the tumor. Liposuction. This treatment uses a needle and a large syringe to remove the fatty lump. It's difficult to remove the entire lipoma with this technique.

## 2021-05-24 NOTE — PROGRESS NOTES
THYROID   Subjective:     Chief Complaint   Patient presents with    6 Month Follow-Up      [address] is a 64 y.o. female who presents for follow-up of thyroid, pt has a lump on her waist line she wanted to get checked out   Name    Annual physical exam    Acquired underactive thyroid    Hypercholesteremia due to HDL > 100    Lipoma of torso    Anxiety    Menopausal hot flushes       Review of Systems   General ROS: fever? No,    Night sweats? No  Ophthalmic ROS:blurry vision or decreased vision? No  ENT ROS: headaches? No   Sore throat? No  Respiratory ROS: cough? No   Shortness of breath? No  Cardiovascular ROS:chest pain? No   Shortness of breath with exertion? No  Gastrointestinal ROS: abdominal pain? No   Change in stools? No    Health Maintenance Due   Topic Date Due    Shingles Vaccine (1 of 2) Never done    TSH testing  10/15/2020     *Chief complaint, HPI and History provided by the medical assistant has been reviewed and verified by provider Devika White MD    HISTORY:  Patient's medications, allergies, past medical, and social histories were reviewed and updated as appropriate.      CHART REVIEW  Health Maintenance   Topic Date Due    Shingles Vaccine (1 of 2) Never done    TSH testing  10/15/2020    Flu vaccine (Season Ended) 09/01/2021    Breast cancer screen  02/17/2023    Cervical cancer screen  03/01/2024    Lipid screen  03/11/2024    DTaP/Tdap/Td vaccine (2 - Td) 05/02/2024    Colon cancer screen colonoscopy  04/27/2025    COVID-19 Vaccine  Completed    Hepatitis C screen  Completed    HIV screen  Completed    Hepatitis A vaccine  Aged Out    Hepatitis B vaccine  Aged Out    Hib vaccine  Aged Out    Meningococcal (ACWY) vaccine  Aged Out    Pneumococcal 0-64 years Vaccine  Aged Out     The 10-year ASCVD risk score (Yang You et al., 2013) is: 1.9%    Values used to calculate the score:      Age: 64 years      Sex: Female      Is Non- : No Diabetic: No      Tobacco smoker: No      Systolic Blood Pressure: 562 mmHg      Is BP treated: No      HDL Cholesterol: 75 mg/dL      Total Cholesterol: 254 mg/dL  Prior to Visit Medications    Medication Sig Taking? Authorizing Provider   gabapentin (NEURONTIN) 100 MG capsule Take 4 capsules by mouth nightly. Intended supply: 30 days Yes Jonah Nash MD   ipratropium (ATROVENT) 0.03 % nasal spray SPRAY 2 SPRAYS IN EACH NOSTRIL EVERY 12 HOURS Yes Chantal Velarde MD   liothyronine (CYTOMEL) 5 MCG tablet TAKE TWO TABLETS BY MOUTH EVERY DAY Yes Jonah Nash MD   cloNIDine (CATAPRES) 0.1 MG tablet Take 1 tablet by mouth daily Yes Ene June MD   buPROPion (WELLBUTRIN XL) 150 MG extended release tablet TAKE ONE TABLET BY MOUTH EVERY MORNING Yes Jonah Nash MD   levocetirizine (XYZAL) 5 MG tablet TAKE ONE TABLET BY MOUTH NIGHTLY Yes Jonah Nash MD   Multiple Vitamin (MULTIVITAMINS PO) Take 1 tablet by mouth daily  Yes Historical Provider, MD   Ascorbic Acid (VITAMIN C) 500 MG tablet Take 500 mg by mouth daily. Yes Historical Provider, MD   magnesium 30 MG tablet Take 400 mg by mouth daily  Yes Historical Provider, MD   zinc gluconate 50 MG tablet Take 50 mg by mouth daily. Yes Historical Provider, MD      Family History   Problem Relation Age of Onset    Cancer Mother         uterine sarcoma, skin non-melanoma    Stroke Mother     Alzheimer's Disease Mother     Cancer Father         skin non-melanoma    Alzheimer's Disease Father      Social History     Tobacco Use    Smoking status: Never Smoker    Smokeless tobacco: Never Used    Tobacco comment: congrats   Vaping Use    Vaping Use: Never used   Substance Use Topics    Alcohol use:  Yes     Alcohol/week: 5.0 standard drinks     Types: 5 Standard drinks or equivalent per week     Comment: socially    Drug use: Never      LAST LABS  Cholesterol, Total   Date Value Ref Range Status   03/11/2019 254 (H) 0 - 199 mg/dL Final     LDL Calculated Date Value Ref Range Status   03/11/2019 147 (H) <100 mg/dL Final     HDL   Date Value Ref Range Status   03/11/2019 75 (H) 40 - 60 mg/dL Final   03/22/2012 98 (H) 40 - 60 mg/dl Final     Triglycerides   Date Value Ref Range Status   03/11/2019 161 (H) 0 - 150 mg/dL Final     Lab Results   Component Value Date    GLUCOSE 100 (H) 11/21/2020     Lab Results   Component Value Date     11/21/2020    K 4.1 11/21/2020    CREATININE 0.6 11/21/2020     Lab Results   Component Value Date    WBC 4.2 11/21/2020    HGB 11.3 (L) 11/21/2020    HCT 33.3 (L) 11/21/2020    MCV 87.6 11/21/2020     11/21/2020     Lab Results   Component Value Date    ALT 7 (L) 10/07/2020    AST 18 10/07/2020    ALKPHOS 78 10/07/2020    BILITOT <0.2 10/07/2020     TSH (uIU/mL)   Date Value   10/15/2019 1.65     Lab Results   Component Value Date    LABA1C 5.3 04/03/2018      Objective:   /78 (Site: Right Upper Arm, Position: Sitting, Cuff Size: Large Adult)   Pulse 78   Resp 16   Ht 5' 5\" (1.651 m)   Wt 189 lb (85.7 kg)   LMP 12/15/2015   SpO2 99%   BMI 31.45 kg/m²   BP Readings from Last 5 Encounters:   05/24/21 120/78   03/01/21 (!) 138/98   11/21/20 (!) 143/90   11/18/20 123/72   10/22/20 104/70     Wt Readings from Last 5 Encounters:   05/24/21 189 lb (85.7 kg)   03/01/21 188 lb (85.3 kg)   11/21/20 174 lb 2.6 oz (79 kg)   10/22/20 176 lb (79.8 kg)   10/19/20 173 lb (78.5 kg)      GENERAL: well-developed, well-nourished, alert, no distress  NECK: Supple, symmetrical, trachea midline  · Thyroid not enlarged, symmetric, no tenderness/mass/nodules  · no cervical nodes, no supraclavicular nodes   LUNGS:  Breathing unlabored  · clear to auscultation bilaterally and good air movement  CARDIOVASC: regular rate and rhythm, S1, S2 normal, no murmur, click, rub or gallop   LEGS:  Lower extremity edema: none    SKIN- 1 - 1.5 cm diam subq nodule, mobile and firm     Assessment and Plan:      Diagnosis Orders   1.  Acquired

## 2021-05-25 DIAGNOSIS — Z00.00 ANNUAL PHYSICAL EXAM: ICD-10-CM

## 2021-05-25 DIAGNOSIS — E03.9 ACQUIRED UNDERACTIVE THYROID: ICD-10-CM

## 2021-05-25 LAB
CHOLESTEROL, TOTAL: 233 MG/DL (ref 0–199)
GLUCOSE BLD-MCNC: 95 MG/DL (ref 70–99)
HDLC SERPL-MCNC: 68 MG/DL (ref 40–60)
LDL CHOLESTEROL CALCULATED: 139 MG/DL
TRIGL SERPL-MCNC: 131 MG/DL (ref 0–150)
TSH SERPL DL<=0.05 MIU/L-ACNC: 2.58 UIU/ML (ref 0.27–4.2)

## 2021-06-29 DIAGNOSIS — J30.0 VASOMOTOR RHINITIS: ICD-10-CM

## 2021-06-30 RX ORDER — LEVOCETIRIZINE DIHYDROCHLORIDE 5 MG/1
TABLET, FILM COATED ORAL
Qty: 90 TABLET | Refills: 1 | Status: SHIPPED | OUTPATIENT
Start: 2021-06-30 | End: 2022-01-04 | Stop reason: ALTCHOICE

## 2021-08-05 DIAGNOSIS — E03.9 ACQUIRED UNDERACTIVE THYROID: ICD-10-CM

## 2021-08-05 RX ORDER — LIOTHYRONINE SODIUM 5 UG/1
TABLET ORAL
Qty: 180 TABLET | Refills: 0 | Status: SHIPPED | OUTPATIENT
Start: 2021-08-05 | End: 2021-11-05

## 2021-08-13 ENCOUNTER — OFFICE VISIT (OUTPATIENT)
Dept: ORTHOPEDIC SURGERY | Age: 56
End: 2021-08-13
Payer: COMMERCIAL

## 2021-08-13 VITALS — BODY MASS INDEX: 31.49 KG/M2 | HEIGHT: 65 IN | WEIGHT: 189 LBS | RESPIRATION RATE: 16 BRPM

## 2021-08-13 DIAGNOSIS — M65.4 DE QUERVAIN'S TENOSYNOVITIS: ICD-10-CM

## 2021-08-13 DIAGNOSIS — M65.331 TRIGGER MIDDLE FINGER OF RIGHT HAND: Primary | ICD-10-CM

## 2021-08-13 DIAGNOSIS — M65.341 TRIGGER RING FINGER OF RIGHT HAND: ICD-10-CM

## 2021-08-13 PROCEDURE — 20550 NJX 1 TENDON SHEATH/LIGAMENT: CPT | Performed by: ORTHOPAEDIC SURGERY

## 2021-08-13 PROCEDURE — 99214 OFFICE O/P EST MOD 30 MIN: CPT | Performed by: ORTHOPAEDIC SURGERY

## 2021-08-13 RX ORDER — TRIAMCINOLONE ACETONIDE 40 MG/ML
20 INJECTION, SUSPENSION INTRA-ARTICULAR; INTRAMUSCULAR ONCE
Status: COMPLETED | OUTPATIENT
Start: 2021-08-13 | End: 2021-08-13

## 2021-08-13 RX ORDER — TRIAMCINOLONE ACETONIDE 40 MG/ML
40 INJECTION, SUSPENSION INTRA-ARTICULAR; INTRAMUSCULAR ONCE
Status: COMPLETED | OUTPATIENT
Start: 2021-08-13 | End: 2021-08-13

## 2021-08-13 RX ADMIN — TRIAMCINOLONE ACETONIDE 20 MG: 40 INJECTION, SUSPENSION INTRA-ARTICULAR; INTRAMUSCULAR at 16:36

## 2021-08-13 RX ADMIN — TRIAMCINOLONE ACETONIDE 40 MG: 40 INJECTION, SUSPENSION INTRA-ARTICULAR; INTRAMUSCULAR at 16:37

## 2021-08-13 NOTE — LETTER
CONSENT TO OPERATION  AND/OR OTHER PROCEDURE(S)          PATIENT : Fanta Caldwell   YOB: 1965      DATE : 8/13/21          1. I request and consent that Dr. Simon Norwood. Abdirahman Bravo,  and/or his associates or assistants perform an operation and/or procedures on the above patient at  [place] [address], to treat the condition(s) which appear indicated by the diagnostic studies already performed. I have been told that in general terms the nature, purpose and reasonable expectations of the operation and/or procedure(s) are:      left 1st Dorsal Compartment (DeQuervain's) Release     Followed 3 weeks later by   right Middle Finger and Ring Finger Trigger Finger Release      2. It has been explained to me by the informing physician that during the course of the operation and/or procedure(s) unforeseen conditions may be revealed that necessitate an extension of the original operation and/or procedure(s) or different operation and/or procedures than those set forth in Paragraph 1. I therefore authorize and request that my physician and/or his associates or assistants perform such operations and/or procedures as are necessary and desirable in the exercise of professional judgment. The authority granted under this Paragraph 2 shall extend to all conditions that require treatment and are known to my physician at the time the operation is commenced. 3. I have been made aware by the informing physician of certain risks and consequences that are associated with the operation and/or procedure(s) described in Paragraph 1, the reasonable alternative methods or treatment, the possible consequences, the possibility that the operation and/or procedure(s) may be unsuccessful and the possibility of complications.   I understand the reasonably known risks to be:      - Bleeding  - Infection  - Poor Healing  - Possible Damage to Nerve, Vessel, Tendon/Muscle or Bone  - Need for further Treatment/Surgery  - Stiffness  - Pain  - Residual or Recurrent Symptoms  - Anesthetic and/or Medical Risks  - We have discussed the specific limitations and risks of hospital and/or office based treatment at this time due to the COVID-19 pandemic                I have been counseled about the risks of brittany Covid-19 in the amanda-operative and post-operative periods related to this procedure. I have been made aware that brittany Covid-19 around the time of a surgical procedure may worsen my prognosis for recovering from the virus and lend to a higher morbidity and or mortality risk. With this knowledge, I have requested to proceed with the procedure as scheduled. 4. I have also been informed by the informing physician that there are other risks from both known and unknown causes that are attendant to the performance of any surgical procedure. I am aware that the practice of medicine and surgery is not an exact science, and that no guarantees have been made to me concerning the results of the operation and/or procedure(s). 5. I   CONSENT / REFUSE CONSENT  (strike the phrase that does not apply) to the taking of photographs before, during and/or after the operation or procedure for scientific/educational purposes. 6. I consent to the administration of anesthesia and to the use of such anesthetics as may be deemed advisable by the anesthesiologist who has been engaged by me or my physician.     7. I certify that I have read and understand the above consent to operation and/or other procedure(s); that the explanations therein referred to were made to me by the informing physician in advance of my signing this consent; that all blanks or statements requiring insertion or completion were filled in and inapplicable paragraphs, if any, were stricken before I signed; and that all questions asked by me about the operation and/or procedure(s) which I have consented to have been fully answered in a satisfactory manner.                                 _______________________           8/13/21                              Witness     Signature Of Patient         Date        Mary Holm                                                 Informing Physician                                           Signature of Informing Physician                              If patient is unable to sign or is a minor, complete one of the following:    (A)  Patient is a minor   years of age. (B)  Patient is unable to sign because: The undersigned represents that he or she is duly authorized to execute this consent for and on behalf of the above named patient. Witness               o  Parent  o  Guardian   o  Spouse       o  Other (specify)                                                          Patient Name: Laurence Gilford  Patient YOB: 1965  Dr. Erma Banegas' Return To Work Policy  Regarding your ability to return to work after surgery or injury, Dr. Erma Banegas will not state that any patient is off of work or cannot work at all. He will place you on restrictions after your surgical procedure or injury. This means that you will be allowed to return to work the day after your office visit or surgery with restrictions. Depending on the details of your particular situation, Dr. Erma Banegas may state that you will have either light use or no use of your hand for a specific number of weeks. It is your obligation to communicate with your employer regarding your restrictions. It is your employer's decision as to whether they will accommodate your restrictions (i.e. allow you to come to work in your restricted capacity) or to not allow you to return to work under your restrictions. Dr. Erma Banegas does not participate in making this decision and cannot influence your employer regarding their decision.   If you do not communicate your restrictions to your employer, or if you do not present to work as you are scheduled to, Dr. Andreea Monroy will not provide an 'excuse' to explain your absence. A doctors note, or official forms (BWC, FMLA, etc.) will be filled out, upon request, to indicate your date of surgery and your restrictions as stated above. Dr. Andreea Monroy' Narcotic Policy  Patients will only be prescribed narcotics after surgical procedures or significant injury. Not all procedures cause pain great enough to require Narcotics and thus, not all patients will receive prescriptions after surgical procedures or injuries. Narcotics are never prescribed for chronic conditions. Narcotics are never prescribed for use longer than one week at a time. Refills are only granted in unusual circumstances and only at Dr. Danika Gonzalez discretion. Patients who are receiving narcotic medication from another physician or who are under pain management contracts will not be given a prescription for narcotics for any reason. I have read the above policies and understand that by agreeing to proceed with treatment by Dr. Josefina Frias and his team, that I am agreeing to abide by these policies.   Patient Name:  [address] [facility]    Patient Signature:  _____________________________    Eleonora Thompson Date:   8/13/21

## 2021-08-13 NOTE — LETTER
[id number] [facility] - HAND SURGERY  Surgery Scheduling Form:  DEMOGRAPHICS:                                                                                                              .  Patient Name:  Felipe Field  Patient :  1965   Patient SS#:  [id number]    Patient Phone:  [phone] (home) [phone] (work)    Patient Address:  [address]     PCP:  Ilana Servin MD  Insurance:    Payor/Plan Subscr  Sex Relation Sub. Ins. ID Effective Group Num   [employer]              DIAGNOSIS & PROCEDURE:                                                                                            .  Diagnosis:   right Middle Finger and Ring Finger Trigger Finger (727.03)  M65.311  Operation:  right Middle Finger and Ring Finger Trigger Finger Release  [CPT: 76084]  Location:  [facility] [facility] OR  Surgeon:  Delbra Aase    SCHEDULING INFORMATION:                                                                                         .    Surgeon's Scheduling Instruction:  Elective mUst wait 90 days after 21    RN Post-op Appt:  [x] Yes   [] No  Preferred Thursday:   [] Yes   [] No    Requested Date:    OR Time:  8:45 Patient Arrival Time:    OR Time Required:  10  Minutes  Anesthesia:  MAC/TIVA  Equipment:  None  Mini C-Arm:  No   Standard C-Arm:  No  Status:  outpatient                                  COVID    1-14  PAT Required:  Yes  Comments:                      Negar Barksdale. Royce Mary MD  21 9:54 AM    BILLING INFORMATION:                                                                                                    .    Procedure:       CPT Code Modifier  right Middle Finger and Ring Finger Trigger Finger Release      .                      Pre Operative Physician Prophylaxis Orders - SCIP Protocols      Pre-Operative Antibiotic Order:    Allergies   Allergen Reactions    Other Itching     thermersol-preservative for contact solution-pt states got itchy eyes    Penicillins Hives    Nickel Rash          [x]  ----  No Antibiotic Ordered       []  ----  Give the following Antibiotic within 1 hour prior to start time:         Ancef 1 gram IV if patient is less than 200 pounds    or       Ancef 2 grams IV if patient is greater than 200 pounds    or      Vancomycin 1 gram IV (over 1 hour) if patient is allergic to           PENICILLINS or CEFALOSPORINS     SURG    1-20                              COVID    1-14                        H&P AT PCP       Procedure: right Middle Finger and Ring Finger Trigger Finger Release     Patient: Deedee Henry  :    1965    Physician Signature:     Date: 21  Time: 9:54 AM

## 2021-08-13 NOTE — LETTER
[address] [facility] - HAND SURGERY  Surgery Scheduling Form:  DEMOGRAPHICS:                                                                                                              .  Patient Name:  Buck López  Patient :  1965   Patient SS#:  [id number]    Patient Phone:  [phone] (home) [phone] (work)   Patient Address:  [address] [place] [address]    PCP:  Steven Ann MD  Insurance:    Payor/Plan Subscr  Sex Relation Sub. Ins. ID Effective Group Num   1. [id number] [facility] 1965 Female Self [id number]                                   [address]     DIAGNOSIS & PROCEDURE:                                                                                            .  Diagnosis:   left DeQuervain's Tendonitis  (727.04)  M65.4  Operation:  left 1st Dorsal Compartment (DeQuervain's) Release  [CPT: 18510]  Location:  [facility]  Surgeon:  Zainab Chow    SCHEDULING INFORMATION:                                                                                         .    Surgeon's Scheduling Instruction:  Elective mUst wait 90 days after 21    RN Post-op Appt:  [x] Yes   [] No  Preferred Thursday:   [] Yes   [] No    Requested Date:    OR Time:   Patient Arrival Time:    OR Time Required:  15  Minutes  Anesthesia:  MAC/TIVA  Equipment:  None  Mini C-Arm:  No   Standard C-Arm:  No  Status:  Outpatient                                     COVID  PAT Required:  Yes  Comments:                      Deven Dietz MD  21 9:55 AM    BILLING INFORMATION:                                                                                                    .    Procedure:       CPT Code Modifier  left 1st Dorsal Compartment (DeQuervain's) Release      .                        Pre Operative Physician Prophylaxis Orders - SCIP Protocols      Pre-Operative Antibiotic Order:    Allergies   Allergen Reactions    Other Itching thermersol-preservative for contact solution-pt states got itchy eyes    Penicillins Hives    Nickel Rash          [x]  ----  No Antibiotic Ordered       []  ----  Give the following Antibiotic within 1 hour prior to start time:         Ancef 1 gram IV if patient is less than 200 pounds    or       Ancef 2 grams IV if patient is greater than 200 pounds    or      Vancomycin 1 gram IV (over 1 hour) if patient is allergic to           PENICILLINS or CEFALOSPORINS        SURG                               COVID                              H&P AT PCP    Procedure: left 1st Dorsal Compartment (DeQuervain's) Release     Patient: Frantz Leonard  :    1965    Physician Signature:     Date: 21  Time: 9:55 AM

## 2021-08-13 NOTE — Clinical Note
Dear  Jyoti Bautista MD,    Thank you very much for your referral or Ms. Fanta Caldwell to me for evaluation and treatment of her Hand & Wrist condition. I appreciate your confidence in me and thank you for allowing me the opportunity to care for your patients. If I can be of any further assistance to you on this or any other patient, please do not hesitate to contact me. Sincerely,    Maurice Flores.  Mariann Gonzales MD

## 2021-08-13 NOTE — PROGRESS NOTES
Administrations This Visit     triamcinolone acetonide (KENALOG-40) injection 20 mg     Admin Date  08/13/2021  16:36 Action  Given Dose  20 mg Route  Other Site  Finger (See Comments) Administered By  Britany Tapia MA    Ordering Provider: Ruma Nuñez MD    NDC: 3630-9311-24    Lot#: [id number]    : [facility] (PRIMARY CARE)    Patient Supplied?: No    Comments: Right Ring    Admin Date  08/13/2021  16:36 Action  Given Dose  20 mg Route  Other Site  Finger (See Comments) Administered By  Britany Tapia MA    Ordering Provider: Ruma Nuñez MD    NDC: 3444-6015-89    Lot#: [id number]    : [facility] (PRIMARY CARE)    Patient Supplied?: No    Comments: Right Middle          triamcinolone acetonide (KENALOG-40) injection 40 mg     Admin Date  08/13/2021  16:37 Action  Given Dose  40 mg Route  Other Site  Wrist Left Administered By  Britany Tapia MA    Ordering Provider: Ruma Nuñez MD    NDC: 7577-0482-23    Lot#: [id number]    : [facility] (PRIMARY CARE)    Patient Supplied?: No

## 2021-08-14 NOTE — PROGRESS NOTES
Ms. Saniya Glover returns today in follow-up of her previously treated  right Middle Finger and Ring Finger stenosing tenosynovitis. She was last seen by me in October, 2020 at which time she was treated with Steroid injection to the Right, Middle Finger, Flexor Tendon Sheath. She experienced complete relief of her initial symptoms. She  has noticed symptom worsening over the last several months. She returns today with worsened symptoms of right Middle Finger and Ring Finger pain, stiffness and catching, requesting further treatment. She also today reports left sided dorso-radial wrist symptoms which have been present for approximately 3 months. A history of antecedent trauma or injury is Absent. She reports symptoms to include moderate pain along the  left first Extensor Compartment Sheath. Wrist symptoms are worse with certain twisting or gripping activities. Symptoms are Frequently worse with use. She reports moderate pain with thumb extension or pinch. Symptoms are worsening over time. I have today reviewed with Fanta Caldwell the clinically relevant, past medical history, medications, allergies,  family history, social history, and Review Of Systems & I have documented any details relevant to today's presenting complaints in my history above. Ms. Amy Gan self-reported past medical history, medications, allergies,  family history, social history, and Review Of Systems have been scanned into the chart under the \"Media\" tab. Physical Exam:  Vitals  Resp: 16  Height: 5' 5\" (165.1 cm)  Weight: 189 lb (85.7 kg)  Ms. Fanta Caldwell appears well, she is in no apparent distress, she demonstrates appropriate mood & affect. Skin: Normal in appearance, Normal Color and Free of Lesions Bilaterally   Digital range of motion is limited by pain on the Right, normal on the Left. Inducible triggering is noted upon flexion in the right Middle Finger and Ring Finger.   There is an associated flexion contracture of the PIP joint. No other digit demonstrates evidence for stenosing tenosynovitis bilaterally. Wrist range of motion is limited by pain on the Left, normal on the Right  There is no evidence of gross joint instability bilaterally. Sensation is subjectively normal bilaterally  Vascular examination reveals normal and good capillary refill bilaterally  Swelling is moderate in the symptomatic digit(s) on the Right, normal on the Left  Muscular strength is clinically appropriate bilaterally. Examination for Stenosing Tenosynovitis demonstrates moderate tenderness, thickening & nodularity at the A-1 pulley(s) of the Right Middle Finger and Ring Finger. There is a palpable Nota's Node. There is Inducible triggering on active flexion with pain. No other digits demonstrate evidence of Stenosing Tenosynovitis. Examination of the left first dorsal extensor compartment of the wrist demonstrates moderate thickening and fullness. There is moderate tenderness to palpation over the extensor tendons. There is moderate pain with resisted thumb extension, and Finklestein's maneuver is positive left side. Impression:  Ms. Saniya Glover is showing evidence of recurrent Stenosing Tenosynovitis (Trigger Finger) & new left  DeQuervain's Tenosynovitis after previous treatment. She requests additional treatment at this time. Plan:  I have had a thorough discussion with Ms. Fanta Caldwell regarding the treatment options available for her initially presenting Left  DeQuervain's Tenosynovitis, which is causing her functional limitations. I have outlined for Ms. Fanta Caldwell the benefits and consequences of the various treatment modalities, including a reasonable expectation for the long term success and the likelihood that further more aggressive treatment may be required for her current presenting condition. Based upon our current discussion and a reasonable understating of the options available to her, Ms. Saniya Glover has selected to proceed order to achieve long lasting relief of her symptoms and to prevent future worsening or further damage, that definitive surgical treatment would be required. Ms. Nidia Amador  voiced an appropriate understanding of our discussion, the options available to her, and of the expectations of her selected  treatment. She did wish to proceed with Right Middle Finger and Ring Finger Flexor Tendon Sheath injection. Procedure:  Injection of left First Dorsal Compartment Sheath  [first Injection]: After full discussion of the nature of this process and outlining a treatment plan with Ms. Fanta Caldwell, we discussed the complications, limitations, expectations, alternatives, and risks of injection to the first dorsal compartment. I have explained the potential for bleeding, infection, potential side effects of the medication, and the remote possibility of damage to surrounding structures as result of the injection. She understood this information well and verbally consented to this treatment. The skin of the symptomatic extremity was prepped with Isopropyl Alcohol and under aseptic conditions the  first dorsal compartment was injected with a combination of 1 ml of Triamcinolone (40 mg/ml) and Bupivacaine 0.25% without Epinephrine. There was good filling of the tendon sheath. A dry, sterile bandage was applied and the she  tolerated the injection without difficulty. I advised her of the expected response, possible reactions and the instructions for care of the hand. Procedure:  right Middle Finger Trigger Finger Injection  [second Injection]: After full discussion of the nature of this process and outlining a treatment plan with Ms. Fanta Caldwell, we discussed the complications, limitations, expectations, alternatives, and risks of injection of the flexor tendon sheath.    I have explained the potential for bleeding, infection, potential side effects of the medication, and the remote possibility of damage to surrounding structures as result of the injection. She understood this information well and verbally consented to this treatment. The skin of the symptomatic digit was prepped with Isopropyl Alcohol and under aseptic conditions the flexor tendon sheath was injected with a combination of 1/2 ml of 0.25% Bupivacaine without Epinephrine and 20 mg of Triamcinolone (40 mg/ml). Good filling of the flexor sheath was noted. A dry sterile bandage was applied and the patient tolerated the injection without difficulty. I advised the patient of the expected response, possible reactions and the instructions for care of the hand. Procedure:  right Ring Finger Trigger Finger Injection  [first Injection]: After full discussion of the nature of this process and outlining a treatment plan with Ms. Fanta Caldwell, we discussed the complications, limitations, expectations, alternatives, and risks of injection of the flexor tendon sheath. I have explained the potential for bleeding, infection, potential side effects of the medication, and the remote possibility of damage to surrounding structures as result of the injection. She understood this information well and verbally consented to this treatment. The skin of the symptomatic digit was prepped with Isopropyl Alcohol and under aseptic conditions the flexor tendon sheath was injected with a combination of 1/2 ml of 0.25% Bupivacaine without Epinephrine and 20 mg of Triamcinolone (40 mg/ml). Good filling of the flexor sheath was noted. A dry sterile bandage was applied and the patient tolerated the injection without difficulty. I advised the patient of the expected response, possible reactions and the instructions for care of the hand. I have also discussed with Ms. Fanta Caldwell the other treatment options available to her for this condition. We have today selected to proceed with treatment by injection with steroid medication.   She and I have agreed that if our current course of Injection treatment does not prove to be effective over the short term future, that she will schedule a follow-up appointment to discuss and select an alternate course of therapy including possibly further conservative treatment or surgical treatment. Ms. Frantz Leonard did express interest in proceeding with definitive surgical treatment for this condition in the short term future. At her request, we will proceed with the necessary administrative steps to facilitate her scheduling the indicated surgery when she wishes to do so. I have had a thorough discussion with Ms. Fanta Caldwell regarding the treatment options available for her continued Middle Finger and Ring Finger stenosing tenosynovitis, which is causing her significant  limitations. I have outlined for Ms. Fanta Caldwell the benefits and consequences of the various treatment modalities, including the fact that surgical treatment is the only modality which is reasonably expected to provide long lasting or permanent resolution of her symptoms. Based upon our current discussion and a reasonable understating of the options available to her, Ms. Frantz Leonard has selected to proceed with surgical Middle Finger and Ring Finger Trigger Finger Release. I have discussed the details of the surgical procedure, the pre, amanda and postoperative concerns and the appropriate expectations after surgery with Ms. Fanta Caldwell today. She was given the opportunity to ask questions, voiced an understanding of the procedure, and she did wish to proceed with Right Middle Finger and Ring Finger Trigger Finger Release (A1 Pulley Release). I had an extensive discussion with Ms. Fanta Caldwell (and any family members present with her today) regarding the natural history, etiology, and long term consequences of this problem. We have mutually selected a surgical treatment plan  and, in my opinion, surgical intervention is indicated at this time.  I have discussed with her the potential complications, limitations, expectations, alternatives, and risks of Trigger Finger Release. She has had full opportunity to ask her questions. I have answered them all to her satisfaction. I feel that Ms. Fanta Caldwell (and any family members present with her today) do understand our discussion today and she has provided informed consent for Right Middle Finger and Ring Finger Trigger Finger Release (A1 Pulley Release). I have had a thorough discussion with Ms. [address] [place] regarding the treatment options available for her new onset  DeQuervain's Tenosynovitis, which is causing her significant  limitations. I have outlined for Ms. Fanta Caldwell the benefits and consequences of the various treatment modalities, including the fact that surgical treatment is the only modality which is reasonably expected to provide long lasting or permanent resolution of her symptoms. Based upon our current discussion and a reasonable understating of the options available to her, Ms. [address] [place] has selected to proceed with surgical DeQuervain's Tendonitis release. I have discussed the details of the surgical procedure, the pre, amanda and postoperative concerns and the appropriate expectations after surgery with Ms. Fanta Caldwell today. She was given the opportunity to ask questions, voiced an understanding of the procedure, and she did wish to proceed with Left 1st Dorsal Extensor Compartment tendon release. I had an extensive discussion with Ms. Fanta Caldwell (and any family members present with her today) regarding the natural history, etiology, and long term consequences of this problem. We have mutually selected a surgical treatment plan  and, in my opinion, surgical intervention is indicated at this time. I have discussed with her the potential complications, limitations, expectations, alternatives, and risks of DeQuervain's Tendonitis release. She has had full opportunity to ask her questions. I have answered them all to her satisfaction.  I feel that Ms. Fanta Caldwell (and any family members present with her today) do understand our discussion today and she has provided informed consent for Left 1st Dorsal Extensor Compartment tendon release. Ms. Laurence Gilford has been given a full verbal list of instructions and precautions related to her present condition. I have asked her to followup with me in the office at the prescribed time. She is also specifically requested to call or return to the office sooner if her symptoms change or worsen prior to the next scheduled appointment.

## 2021-08-14 NOTE — PATIENT INSTRUCTIONS
Information & Instructions   After Finger, Hand, Wrist, or Elbow Injection    Dorys Collado MD    You have received an injection of local anesthetic (Bupivicaine without Epinephrine) for comfort & a steroid (Kenalog) for its strong anti-inflammatory effects. In order to give the medication a chance to reduce your inflammation and discomfort, it is recommended that you take it easy for a day or so. You may use your hand and arm as you feel comfortable, but you should avoid highly strenuous activity and heavy use for several days. Relief from the injection will often not begin for several days, and you may not feel full relief for up to one month. It is not uncommon to experience some local discomfort or pain at or around the injection site for a few days. To relieve these symptoms you may do the following if you feel necessary:       Apply ice to the affected area 20 minutes on and 20 minutes off. Do not apply ice directly to the skin. Use a thin layer (T-shirt, pillowcase, towel, etc.) to protect the skin. - If allowed by your other medical physicians, you may take -     2 Tylenol extra strength tablets every 4-6 hours       1-2 Aleve tablets twice a day     2-3 Advil tablets two to three times a day    If you are diabetic, the steroid medication may increase your blood sugar, so you are advised to monitor your sugar more closely so you can adjust it accordingly for a few days following your injection. If you need assistance with the control of you blood sugar, please contact you primary care physician for further advice. I will request that you please call the office one month after your injection at [phone] if you have not experienced relief of your symptoms (unless I have instructed you otherwise). If your injection has given you good relief of you symptoms as expected, then you only need to call the office if your symptoms return.

## 2021-08-30 DIAGNOSIS — F41.9 ANXIETY: ICD-10-CM

## 2021-08-30 RX ORDER — BUPROPION HYDROCHLORIDE 150 MG/1
TABLET ORAL
Qty: 90 TABLET | Refills: 0 | Status: SHIPPED | OUTPATIENT
Start: 2021-08-30 | End: 2021-11-05

## 2021-11-05 DIAGNOSIS — F41.9 ANXIETY: ICD-10-CM

## 2021-11-05 DIAGNOSIS — E03.9 ACQUIRED UNDERACTIVE THYROID: ICD-10-CM

## 2021-11-05 RX ORDER — BUPROPION HYDROCHLORIDE 150 MG/1
TABLET ORAL
Qty: 90 TABLET | Refills: 0 | Status: SHIPPED | OUTPATIENT
Start: 2021-11-05 | End: 2022-03-04 | Stop reason: SDUPTHER

## 2021-11-05 RX ORDER — LIOTHYRONINE SODIUM 5 UG/1
TABLET ORAL
Qty: 180 TABLET | Refills: 0 | Status: SHIPPED | OUTPATIENT
Start: 2021-11-05 | End: 2022-01-31

## 2021-12-15 DIAGNOSIS — M65.331 TRIGGER MIDDLE FINGER OF RIGHT HAND: Primary | ICD-10-CM

## 2021-12-30 ENCOUNTER — TELEPHONE (OUTPATIENT)
Dept: ORTHOPEDIC SURGERY | Age: 56
End: 2021-12-30

## 2021-12-30 NOTE — TELEPHONE ENCOUNTER
CPT: 31603  BODY PART: right fingers  STATUS: outpatient  AUTHORIZATION: [employer]    Per [place] website, [facility]

## 2022-01-04 ENCOUNTER — OFFICE VISIT (OUTPATIENT)
Dept: FAMILY MEDICINE CLINIC | Age: 57
End: 2022-01-04
Payer: COMMERCIAL

## 2022-01-04 VITALS
DIASTOLIC BLOOD PRESSURE: 84 MMHG | OXYGEN SATURATION: 97 % | BODY MASS INDEX: 31.65 KG/M2 | RESPIRATION RATE: 14 BRPM | TEMPERATURE: 98.2 F | SYSTOLIC BLOOD PRESSURE: 132 MMHG | HEART RATE: 79 BPM | WEIGHT: 190 LBS | HEIGHT: 65 IN

## 2022-01-04 DIAGNOSIS — M65.331 TRIGGER FINGER, RIGHT MIDDLE FINGER: ICD-10-CM

## 2022-01-04 DIAGNOSIS — Z01.818 PREOP EXAMINATION: Primary | ICD-10-CM

## 2022-01-04 DIAGNOSIS — M65.341 TRIGGER FINGER, RIGHT RING FINGER: ICD-10-CM

## 2022-01-04 PROCEDURE — 99213 OFFICE O/P EST LOW 20 MIN: CPT | Performed by: NURSE PRACTITIONER

## 2022-01-04 RX ORDER — LORATADINE 10 MG/1
10 CAPSULE, LIQUID FILLED ORAL PRN
COMMUNITY

## 2022-01-04 NOTE — PROGRESS NOTES
Preoperative Consultation      Fanta Caldwell  YOB: 1965    Date of Service:  1/4/2022    Vitals:    01/04/22 0831   BP: 132/84   Site: Right Upper Arm   Position: Sitting   Cuff Size: Large Adult   Pulse: 79   Resp: 14   Temp: 98.2 °F (36.8 °C)   TempSrc: Oral   SpO2: 97%   Weight: 190 lb (86.2 kg)   Height: 5' 5\" (1.651 m)      Wt Readings from Last 2 Encounters:   01/04/22 190 lb (86.2 kg)   08/13/21 189 lb (85.7 kg)     BP Readings from Last 3 Encounters:   01/04/22 132/84   05/24/21 120/78   03/01/21 (!) 138/98        Chief Complaint   Patient presents with   [place] Pre-op Exam     rt hand trigger finger sx. 1/20/2022. dr Griselda Saleh. Allergies   Allergen Reactions    Other Itching     thermersol-preservative for contact solution-pt states got itchy eyes    Penicillins Hives    Nickel Rash     Outpatient Medications Marked as Taking for the 1/4/22 encounter (Office Visit) with Minus Bahamian, APRN - CNP   Medication Sig Dispense Refill    loratadine (CLARITIN) 10 MG capsule Take 10 mg by mouth daily      buPROPion (WELLBUTRIN XL) 150 MG extended release tablet TAKE ONE TABLET BY MOUTH EVERY MORNING 90 tablet 0    liothyronine (CYTOMEL) 5 MCG tablet TAKE TWO TABLETS BY MOUTH EVERY  tablet 0    ipratropium (ATROVENT) 0.03 % nasal spray SPRAY 2 SPRAYS IN EACH NOSTRIL EVERY 12 HOURS 1 Bottle 3    cloNIDine (CATAPRES) 0.1 MG tablet Take 1 tablet by mouth daily 90 tablet 3    Multiple Vitamin (MULTIVITAMINS PO) Take 1 tablet by mouth daily       Ascorbic Acid (VITAMIN C) 500 MG tablet Take 500 mg by mouth daily.  magnesium 30 MG tablet Take 400 mg by mouth daily       zinc gluconate 50 MG tablet Take 50 mg by mouth daily.          This patient presents to the office today for a preoperative consultation at the request of surgeon, Dr. Rajendra Hoang, who plans on performing right middle finger and ring finger trigger finger release on January 20, 2022 at [facility].  The current problem began 5 months ago, and symptoms have been worsening with time. Conservative therapy: Yes: cortisone injection, which has been not very effective. Patient reports that she has trigger fingers that is causing pain and decreased ROM.      Planned anesthesia: MAC   Known anesthesia problems: None   Bleeding risk: No recent or remote history of abnormal bleeding  Personal or FH of DVT/PE: No    Patient objection to receiving blood products: No    Patient Active Problem List   Diagnosis    Hypercholesteremia due to HDL > 100    Low testosterone level in female    Elevated C-reactive protein (CRP)    Primary osteoarthritis of first carpometacarpal joint of left hand    Family history of Alzheimer's disease    Eczema    Acquired underactive thyroid    Reactive airway disease    Chronic allergic rhinitis    Eustachian catarrh, bilateral    Allergic sinusitis    Neuritis    Vasomotor rhinitis    Diverticulitis    Trigger middle finger of right hand    Sigmoid diverticulitis       Past Medical History:   Diagnosis Date    Abnormal Pap smear of cervix     Acquired underactive thyroid 4/20/2017    Asthma     when have colds goes into asthma    Chronic allergic rhinitis 4/3/2018    Diverticulitis 2006    colon 2006    Family history of Alzheimer's disease 11/15/2016    Fibrocystic breast     Hypercholesteremia due to HDL > 100 10/17/2013    no meds    Irregular uterine bleeding     Menopause ovarian failure     Primary osteoarthritis of first carpometacarpal joint of left hand 9/25/2015     Past Surgical History:   Procedure Laterality Date    BREAST BIOPSY      COLONOSCOPY      x2   Rudy Rodriguez liposuction-abdomen and thighs    EYE SURGERY      corrective    FINGER TRIGGER RELEASE Left 5/19/16    Middle Finger    HAND SURGERY Left 5/8/14    Middle Finger Ulnar Digital Nerve contusion and neuropraxia    SMALL INTESTINE SURGERY N/A 11/18/2020    SIGMOID COLECTOM[facility]ty [facility] History   Problem Relation Age of Onset    Cancer Mother         uterine sarcoma, skin non-melanoma    Stroke Mother     Alzheimer's Disease Mother     Cancer Father         skin non-melanoma    Alzheimer's Disease Father      Social History     Socioeconomic History    Marital status: Single     Spouse name: Not on file    Number of children: 0    Years of education: Not on file    Highest education level: Not on file   Occupational History    Occupation: Physical Therapist     Employer: [address] [place] [address]: [employer]   Tobacco Use    Smoking status: Never Smoker    Smokeless tobacco: Never Used    Tobacco comment: congrats   Vaping Use    Vaping Use: Never used   Substance and Sexual Activity    Alcohol use: Yes     Alcohol/week: 5.0 standard drinks     Types: 5 Standard drinks or equivalent per week     Comment: socially    Drug use: Never       Review of Systems  A comprehensive review of systems was negative except for what was noted in the HPI. Physical Exam   Constitutional: She is oriented to person, place, and time. She appears well-developed and well-nourished. No distress. HENT:   Head: Normocephalic and atraumatic. Mouth/Throat: Uvula is midline, oropharynx is clear and moist and mucous membranes are normal.   Eyes: Conjunctivae and EOM are normal. Pupils are equal, round, and reactive to light. Neck: Trachea normal and normal range of motion. Neck supple. No JVD present. Carotid bruit is not present. Cardiovascular: Normal rate, regular rhythm, normal heart sounds and intact distal pulses. Exam reveals no gallop and no friction rub. No murmur heard. Pulmonary/Chest: Effort normal and breath sounds normal. No respiratory distress. She has no wheezes. She has no rales. Musculoskeletal: She exhibits no edema and no tenderness.    Neurological: She is alert and oriented to person, place, and time. She has normal strength. No cranial nerve deficit or sensory deficit. Coordination and gait normal.   Skin: Skin is warm and dry. No rash noted. No erythema. Psychiatric: She has a normal mood and affect. Her behavior is normal.       Lab Review   Lab Results   Component Value Date     11/21/2020    K 4.1 11/21/2020     11/21/2020    CO2 28 11/21/2020    BUN 8 11/21/2020    CREATININE 0.6 11/21/2020    GLUCOSE 95 05/25/2021    CALCIUM 8.7 11/21/2020     Lab Results   Component Value Date    WBC 4.2 11/21/2020    HGB 11.3 11/21/2020    HCT 33.3 11/21/2020    MCV 87.6 11/21/2020     11/21/2020           Assessment:       64 y.o. patient with planned surgery as above. Known risk factors for perioperative complications: None  Current medications which may produce withdrawal symptoms if withheld perioperatively: none      Plan:     1. Preoperative workup as follows: none  2. Change in medication regimen before surgery: Hold all medications on morning of surgery  3. Prophylaxis for cardiac events with perioperative beta-blockers: Not indicated  ACC/AHA indications for pre-operative beta-blocker use:    · Vascular surgery with history of postitive stress test  · Intermediate or high risk surgery with history of CAD   · Intermediate or high risk surgery with multiple clinical predictors of CAD- 2 of the following: history of compensated or prior heart failure, history of cerebrovascular disease, DM, or renal insufficiency    Routine administration of higher-dose, long-acting metoprolol in beta-blocker-naïve patients on the day of surgery, and in the absence of dose titration is associated with an overall increase in mortality. Beta-blockers should be started days to weeks prior to surgery and titrated to pulse < 70.  4. Deep vein thrombosis prophylaxis: regimen to be chosen by surgical team  5.  No contraindications to planned surgery of right middle finger and ring finger trigger release with Dr. Darcy Samaniego on 1/20/22.

## 2022-01-06 ENCOUNTER — TELEPHONE (OUTPATIENT)
Dept: ORTHOPEDIC SURGERY | Age: 57
End: 2022-01-06

## 2022-01-12 NOTE — PROGRESS NOTES
SAFETY FIRST. .call before you fall    C-Difficile admission screening and protocol:     * Admitted with diarrhea?n     *Prior history of C-Diff. In last 3 months?n     *Antibiotic use in the past 6-8 weeks?n     *Prior hospitalization or nursing home in the last month? n       [address] time_____715__        Surgery time___840  Take the following medications with a sip of water:    Do not eat or drink anything after 12:00 midnight prior to your surgery. This includes water chewing gum, mints and ice chips. You may brush your teeth and gargle the morning of your surgery, but do not swallow the water     Please see your family doctor/pediatrician for a history and physical and/or concerning medications. Bring any test results/reports from your physicians office. If you are under the care of a heart doctor or specialist doctor, please be aware that you may be asked to them for clearance    You may be asked to stop blood thinners such as Coumadin, Plavix, Fragmin, Lovenox, etc., or any anti-inflammatories such as:  Aspirin, Ibuprofen, Advil, Naproxen prior to your surgery. We also ask that you stop any OTC medications such as fish oil, vitamin E, glucosamine, garlic, Multivitamins, COQ 10, etc.    We ask that you do not smoke 24 hours prior to surgery  We ask that you do not  drink any alcoholic beverages 24 hours prior to surgery     You must make arrangements for a responsible adult to take you home after your surgery. For your safety you will not be allowed to leave alone or drive yourself home. Your surgery will be cancelled if you do not have a ride home. Also for your safety, it is strongly suggested that someone stay with you the first 24 hours after your surgery. A parent or legal guardian must accompany a child scheduled for surgery and plan to stay at the hospital until the child is discharged.     Please do not bring other children with you.    For your comfort, please wear simple loose fitting clothing to the hospital.  Please do not bring valuables. Do not wear any make-up or nail polish on your fingers or toes      For your safety, please do not wear any jewelry or body piercing's on the day of surgery. All jewelry must be removed. If you have dentures, they will be removed before going to operating room. For your convenience, we will provide you with a container. If you wear contact lenses or glasses, they will be removed, please bring a case for them. If you have a living will and a durable power of  for healthcare, please bring in a copy. As part of our patient safety program to minimize surgical site infections, we ask you to do the following:    · Please notify your surgeon if you develop any illness between         now and the  day of your surgery. · This includes a cough, cold, fever, sore throat, nausea,         or vomiting, and diarrhea, etc.  ·  Please notify your surgeon if you experience dizziness, shortness         of breath or blurred vision between now and the time of your surgery. Do not shave your operative site 96 hours prior to surgery. For face and neck surgery, men may use an electric razor 48 hours   prior to surgery. You may shower the night before surgery or the morning of   your surgery with an antibacterial soap. You will need to bring a photo ID and insurance card    [facility] has an onsite pharmacy, would you like to utilize our pharmacy     If you will be staying overnight and use a C-pap machine, please bring   your C-pap to hospital     Our goal is to provide you with excellent care, therefore, visitors will be limited to two(2) in the room at a time so that we may focus on providing this care for you. Please contact pre-admission testing if you have any further questions.                  [facility] phone number:  [phone] PAT fax number: [phone]  Please note these are generalized instructions for all surgical cases, you may be provided with more specific instructions according to your surgery.

## 2022-01-12 NOTE — PROGRESS NOTES
Unfortunately due the rise in covid cases, staffing crisis and hospital capacity, your procedure may need to be rescheduled--if this were to happen your Surgeons office will notify you ASAP    Covid testing to be done @1/14/2022 at [facility] per pt  If positive---Pt instructed to notify MD ASAP   If negative--pt was instructed to bring results DOP/DOS    Preoperative Screening for Elective Surgery/Invasive Procedures While COVID-19 present in the community     1. Have you tested positive or have been told to self-isolate for COVID-19 like symptoms within the past 28 days?no  2. Do you currently have any of the following symptoms?no  ? Fever >100.0 F or 99.9 F in immunocompromised patients?n  ? New onset cough, shortness of breath or difficulty breathing?n  ? New onset sore throat, myalgia (muscle aches and pains), headache, loss of taste/smell or diarrhea?n  3. Have you had a potential exposure to COVID-19 within the past 14 days by:no  ? Close contact with a confirmed case? ? Close contact with a healthcare worker,  or essential infrastructure worker (grocery store, [employer], gas station, public utilities or transportation)? yes  ? Do you reside in a congregate setting such as; skilled nursing facility, adult home, correctional facility, homeless shelter or other institutional setting?nHave you had recent travel to a known COVID-19 hotspot?n     Indicate if the patient has a positive screen by answering yes to one or more of the above questions.

## 2022-01-12 NOTE — PROGRESS NOTES
Following IM message left with Gómez Mensah office re: DOS consent    [12:43 PM] Kady Timmons  for [employer] on 1/20/22 with Gómez Mensah pt states \"other\" procedures were discussed with Gómez Mensah for the DOS, other than R ring middle finger and ring finger trigger finger release    [12:44 PM] Kady Timmons  please discuss with pt and bronwyn and confirm so that consent is correct for DOS, thank you    PAT interview complete for DOS

## 2022-01-14 DIAGNOSIS — M65.331 TRIGGER MIDDLE FINGER OF RIGHT HAND: ICD-10-CM

## 2022-01-14 LAB — SARS-COV-2: NOT DETECTED

## 2022-01-19 ENCOUNTER — ANESTHESIA EVENT (OUTPATIENT)
Dept: OPERATING ROOM | Age: 57
End: 2022-01-19
Payer: COMMERCIAL

## 2022-01-20 ENCOUNTER — HOSPITAL ENCOUNTER (OUTPATIENT)
Age: 57
Setting detail: OUTPATIENT SURGERY
Discharge: HOME | End: 2022-01-20
Attending: ORTHOPAEDIC SURGERY | Admitting: ORTHOPAEDIC SURGERY
Payer: COMMERCIAL

## 2022-01-20 ENCOUNTER — ANESTHESIA (OUTPATIENT)
Dept: OPERATING ROOM | Age: 57
End: 2022-01-20
Payer: COMMERCIAL

## 2022-01-20 VITALS
TEMPERATURE: 98 F | DIASTOLIC BLOOD PRESSURE: 84 MMHG | RESPIRATION RATE: 18 BRPM | WEIGHT: 186.29 LBS | OXYGEN SATURATION: 98 % | BODY MASS INDEX: 31.04 KG/M2 | HEART RATE: 75 BPM | HEIGHT: 65 IN | SYSTOLIC BLOOD PRESSURE: 119 MMHG

## 2022-01-20 VITALS
DIASTOLIC BLOOD PRESSURE: 77 MMHG | OXYGEN SATURATION: 100 % | SYSTOLIC BLOOD PRESSURE: 120 MMHG | RESPIRATION RATE: 16 BRPM

## 2022-01-20 PROCEDURE — 7100000001 HC PACU RECOVERY - ADDTL 15 MIN: Performed by: ORTHOPAEDIC SURGERY

## 2022-01-20 PROCEDURE — 2500000003 HC RX 250 WO HCPCS: Performed by: ORTHOPAEDIC SURGERY

## 2022-01-20 PROCEDURE — 3600000005 HC SURGERY LEVEL 5 BASE: Performed by: ORTHOPAEDIC SURGERY

## 2022-01-20 PROCEDURE — 7100000000 HC PACU RECOVERY - FIRST 15 MIN: Performed by: ORTHOPAEDIC SURGERY

## 2022-01-20 PROCEDURE — 3600000015 HC SURGERY LEVEL 5 ADDTL 15MIN: Performed by: ORTHOPAEDIC SURGERY

## 2022-01-20 PROCEDURE — 2709999900 HC NON-CHARGEABLE SUPPLY: Performed by: ORTHOPAEDIC SURGERY

## 2022-01-20 PROCEDURE — 2580000003 HC RX 258: Performed by: ANESTHESIOLOGY

## 2022-01-20 PROCEDURE — 26055 INCISE FINGER TENDON SHEATH: CPT | Performed by: ORTHOPAEDIC SURGERY

## 2022-01-20 PROCEDURE — 3700000001 HC ADD 15 MINUTES (ANESTHESIA): Performed by: ORTHOPAEDIC SURGERY

## 2022-01-20 PROCEDURE — 2500000003 HC RX 250 WO HCPCS

## 2022-01-20 PROCEDURE — 3700000000 HC ANESTHESIA ATTENDED CARE: Performed by: ORTHOPAEDIC SURGERY

## 2022-01-20 PROCEDURE — 6360000002 HC RX W HCPCS

## 2022-01-20 PROCEDURE — A4217 STERILE WATER/SALINE, 500 ML: HCPCS | Performed by: ORTHOPAEDIC SURGERY

## 2022-01-20 PROCEDURE — 2580000003 HC RX 258: Performed by: ORTHOPAEDIC SURGERY

## 2022-01-20 PROCEDURE — 7100000010 HC PHASE II RECOVERY - FIRST 15 MIN: Performed by: ORTHOPAEDIC SURGERY

## 2022-01-20 PROCEDURE — 7100000011 HC PHASE II RECOVERY - ADDTL 15 MIN: Performed by: ORTHOPAEDIC SURGERY

## 2022-01-20 RX ORDER — SODIUM CHLORIDE 9 MG/ML
INJECTION, SOLUTION INTRAVENOUS CONTINUOUS
Status: DISCONTINUED | OUTPATIENT
Start: 2022-01-20 | End: 2022-01-20 | Stop reason: HOSPADM

## 2022-01-20 RX ORDER — FENTANYL CITRATE 50 UG/ML
50 INJECTION, SOLUTION INTRAMUSCULAR; INTRAVENOUS EVERY 5 MIN PRN
Status: DISCONTINUED | OUTPATIENT
Start: 2022-01-20 | End: 2022-01-20 | Stop reason: HOSPADM

## 2022-01-20 RX ORDER — DIPHENHYDRAMINE HYDROCHLORIDE 50 MG/ML
12.5 INJECTION INTRAMUSCULAR; INTRAVENOUS
Status: DISCONTINUED | OUTPATIENT
Start: 2022-01-20 | End: 2022-01-20 | Stop reason: HOSPADM

## 2022-01-20 RX ORDER — MEPERIDINE HYDROCHLORIDE 25 MG/ML
12.5 INJECTION INTRAMUSCULAR; INTRAVENOUS; SUBCUTANEOUS EVERY 5 MIN PRN
Status: DISCONTINUED | OUTPATIENT
Start: 2022-01-20 | End: 2022-01-20 | Stop reason: HOSPADM

## 2022-01-20 RX ORDER — BUPIVACAINE HYDROCHLORIDE 5 MG/ML
INJECTION, SOLUTION EPIDURAL; INTRACAUDAL
Status: COMPLETED | OUTPATIENT
Start: 2022-01-20 | End: 2022-01-20

## 2022-01-20 RX ORDER — LIDOCAINE HYDROCHLORIDE 10 MG/ML
INJECTION, SOLUTION EPIDURAL; INFILTRATION; INTRACAUDAL; PERINEURAL
Status: COMPLETED | OUTPATIENT
Start: 2022-01-20 | End: 2022-01-20

## 2022-01-20 RX ORDER — SODIUM CHLORIDE 9 MG/ML
25 INJECTION, SOLUTION INTRAVENOUS PRN
Status: DISCONTINUED | OUTPATIENT
Start: 2022-01-20 | End: 2022-01-20 | Stop reason: HOSPADM

## 2022-01-20 RX ORDER — PROPOFOL 10 MG/ML
INJECTION, EMULSION INTRAVENOUS PRN
Status: DISCONTINUED | OUTPATIENT
Start: 2022-01-20 | End: 2022-01-20 | Stop reason: SDUPTHER

## 2022-01-20 RX ORDER — PROMETHAZINE HYDROCHLORIDE 25 MG/ML
6.25 INJECTION, SOLUTION INTRAMUSCULAR; INTRAVENOUS
Status: DISCONTINUED | OUTPATIENT
Start: 2022-01-20 | End: 2022-01-20 | Stop reason: HOSPADM

## 2022-01-20 RX ORDER — SODIUM CHLORIDE 0.9 % (FLUSH) 0.9 %
10 SYRINGE (ML) INJECTION PRN
Status: DISCONTINUED | OUTPATIENT
Start: 2022-01-20 | End: 2022-01-20 | Stop reason: HOSPADM

## 2022-01-20 RX ORDER — ONDANSETRON 2 MG/ML
4 INJECTION INTRAMUSCULAR; INTRAVENOUS
Status: DISCONTINUED | OUTPATIENT
Start: 2022-01-20 | End: 2022-01-20 | Stop reason: HOSPADM

## 2022-01-20 RX ORDER — PROPOFOL 10 MG/ML
INJECTION, EMULSION INTRAVENOUS CONTINUOUS PRN
Status: DISCONTINUED | OUTPATIENT
Start: 2022-01-20 | End: 2022-01-20 | Stop reason: SDUPTHER

## 2022-01-20 RX ORDER — LABETALOL HYDROCHLORIDE 5 MG/ML
5 INJECTION, SOLUTION INTRAVENOUS EVERY 10 MIN PRN
Status: DISCONTINUED | OUTPATIENT
Start: 2022-01-20 | End: 2022-01-20 | Stop reason: HOSPADM

## 2022-01-20 RX ORDER — LIDOCAINE HYDROCHLORIDE 20 MG/ML
INJECTION, SOLUTION EPIDURAL; INFILTRATION; INTRACAUDAL; PERINEURAL PRN
Status: DISCONTINUED | OUTPATIENT
Start: 2022-01-20 | End: 2022-01-20 | Stop reason: SDUPTHER

## 2022-01-20 RX ORDER — OXYCODONE HYDROCHLORIDE AND ACETAMINOPHEN 5; 325 MG/1; MG/1
1 TABLET ORAL
Status: DISCONTINUED | OUTPATIENT
Start: 2022-01-20 | End: 2022-01-20 | Stop reason: HOSPADM

## 2022-01-20 RX ORDER — SODIUM CHLORIDE 0.9 % (FLUSH) 0.9 %
10 SYRINGE (ML) INJECTION EVERY 12 HOURS SCHEDULED
Status: DISCONTINUED | OUTPATIENT
Start: 2022-01-20 | End: 2022-01-20 | Stop reason: HOSPADM

## 2022-01-20 RX ORDER — MAGNESIUM HYDROXIDE 1200 MG/15ML
LIQUID ORAL CONTINUOUS PRN
Status: COMPLETED | OUTPATIENT
Start: 2022-01-20 | End: 2022-01-20

## 2022-01-20 RX ORDER — FENTANYL CITRATE 50 UG/ML
25 INJECTION, SOLUTION INTRAMUSCULAR; INTRAVENOUS EVERY 5 MIN PRN
Status: DISCONTINUED | OUTPATIENT
Start: 2022-01-20 | End: 2022-01-20 | Stop reason: HOSPADM

## 2022-01-20 RX ADMIN — PROPOFOL 120 MG: 10 INJECTION, EMULSION INTRAVENOUS at 08:35

## 2022-01-20 RX ADMIN — PROPOFOL 180 MCG/KG/MIN: 10 INJECTION, EMULSION INTRAVENOUS at 08:35

## 2022-01-20 RX ADMIN — SODIUM CHLORIDE: 9 INJECTION, SOLUTION INTRAVENOUS at 08:15

## 2022-01-20 RX ADMIN — LIDOCAINE HYDROCHLORIDE 50 MG: 20 INJECTION, SOLUTION EPIDURAL; INFILTRATION; INTRACAUDAL; PERINEURAL at 08:35

## 2022-01-20 ASSESSMENT — PULMONARY FUNCTION TESTS
PIF_VALUE: 0
PIF_VALUE: 1
PIF_VALUE: 0
PIF_VALUE: 1
PIF_VALUE: 0

## 2022-01-20 ASSESSMENT — PAIN SCALES - GENERAL
PAINLEVEL_OUTOF10: 0

## 2022-01-20 ASSESSMENT — ENCOUNTER SYMPTOMS: SHORTNESS OF BREATH: 0

## 2022-01-20 ASSESSMENT — PAIN - FUNCTIONAL ASSESSMENT: PAIN_FUNCTIONAL_ASSESSMENT: 0-10

## 2022-01-20 ASSESSMENT — LIFESTYLE VARIABLES: SMOKING_STATUS: 0

## 2022-01-20 NOTE — H&P
Pre-operative Update of H&P:    I  have seen & examined Ms. Fanta Caldwell related solely to her hand and upper extremity conditions, prior to the scheduled procedure on the date of her surgery. The indications for the planned surgical procedure & and her upper-extremity condition are unchanged.

## 2022-01-20 NOTE — PROGRESS NOTES
Tolerating oral intake and sitting up on side of bed. No complaints. Discharge instructions given to patient and boyfriend. Verbalize understanding. Stable for discharge.

## 2022-01-20 NOTE — ANESTHESIA POSTPROCEDURE EVALUATION
Department of Anesthesiology  Postprocedure Note    Patient: Tona Krueger  MRN: [id number]  YOB: 1965  Date of evaluation: 1/20/2022  Time:  9:53 AM     Procedure Summary     Date: 01/20/22 Room / Location: [address]    Anesthesia Start: 9788 Anesthesia Stop: 7982    Procedure: RIGHT INDEX FINGER, MIDDLE FINGER AND RING FINGER TRIGGER FINGER RELEASE (Right Hand) Diagnosis:       Trigger finger of all digits of right hand      (RIGHT MIDDLE FINGER AND RING FINGER TRIGGER FINGER)    Surgeons: Arnulfo Sheffield MD Responsible Provider: Jaqueline Markhma MD    Anesthesia Type: MAC ASA Status: 2          Anesthesia Type: MAC    Natalia Phase I: Natalia Score: 10    Natalia Phase II: Natalia Score: 10    Last vitals: Reviewed and per EMR flowsheets.        Anesthesia Post Evaluation    Patient location during evaluation: bedside  Patient participation: complete - patient participated  Level of consciousness: awake and alert  Pain score: 0  Nausea & Vomiting: no nausea  Complications: no  Cardiovascular status: hemodynamically stable  Respiratory status: acceptable  Hydration status: stable

## 2022-01-20 NOTE — OP NOTE
cleared of surrounding soft tissue. The A1 pulley was identified and incised longitudinally along its entire length under direct visualization. The flexor tendons were gently withdrawn from the sheath and inspected. They were found to be in good condition. The tendons were returned to their appropriate location and the finger was placed through a full range of motion. There was no evidence of residual stenosis or triggering. A 1 centimeter oblique incision was fashioned over the base of the flexor tendon sheath of the Right Middle Finger. Dissection was carried carefully through the subcutaneous tissues, taking great care to identify and protect the neurovascular structures. The flexor tendon sheath was carefully identified and cleared of surrounding soft tissue. The A1 pulley was identified and incised longitudinally along its entire length under direct visualization. The flexor tendons were gently withdrawn from the sheath and inspected. They were found to be in good condition. The tendons were returned to their appropriate location and the finger was placed through a full range of motion. There was no evidence of residual stenosis or triggering. A 1 centimeter oblique incision was fashioned over the base of the flexor tendon sheath of the Right Ring Finger. Dissection was carried carefully through the subcutaneous tissues, taking great care to identify and protect the neurovascular structures. The flexor tendon sheath was carefully identified and cleared of surrounding soft tissue. The A1 pulley was identified and incised longitudinally along its entire length under direct visualization. The flexor tendons were gently withdrawn from the sheath and inspected. They were found to be in good condition. The tendons were returned to their appropriate location and the finger was placed through a full range of motion. There was no evidence of residual stenosis or triggering.       The wound was irrigated copiously with sterile saline for irrigation and the pneumo-tourniquet was deflated after a period of 4 minutes elevation. Hemostasis was easily obtained with direct pressure and electrocautery. The wound was closed with interrupted sutures in the skin. The wound was dressed with adaptic, dry sterile gauze, and a bulky, hand based dressing was applied. Ms. Arturo Beckham  was awakened from light sedation, having tolerated the procedure without apparent complication, and was returned to the recovery room in stable condition. At the conclusion of the procedure all needle, instrument, and sponge counts were correct. Freddy Sue MD   1/20/2022, 8:51 AM

## 2022-01-20 NOTE — PROGRESS NOTES
Pt arrived to pacu from OR awake. VSS on monitor. O2 off pt breathes easy on RA. Dressing right hand cdi elevated on pillow. Pt denies pain and nausea. Pt remains awake.

## 2022-01-20 NOTE — ANESTHESIA PRE PROCEDURE
Department of Anesthesiology  Preprocedure Note       Name:  Tabatha Warner   Age:  64 y.o.  :  1965                                          MRN:  [id number]         Date:  2022      Surgeon: Isidro Rosa):  Charmaine De Leon MD    Procedure: Procedure(s):  RIGHT MIDDLE FINGER AND RING FINGER TRIGGER FINGER RELEASE    Medications prior to admission:   Prior to Admission medications    Medication Sig Start Date End Date Taking? Authorizing Provider   loratadine (CLARITIN) 10 MG capsule Take 10 mg by mouth daily   Yes Historical Provider, MD   buPROPion (WELLBUTRIN XL) 150 MG extended release tablet TAKE ONE TABLET BY MOUTH EVERY MORNING 21  Yes Nataliia Hence, MD   liothyronine (CYTOMEL) 5 MCG tablet TAKE TWO TABLETS BY MOUTH EVERY DAY 21  Yes Nataliia Hence, MD   ipratropium (ATROVENT) 0.03 % nasal spray SPRAY 2 SPRAYS IN EACH NOSTRIL EVERY 12 HOURS  Patient taking differently: 2 sprays by Nasal route as needed  21  Yes Darshan Gaona MD   cloNIDine (CATAPRES) 0.1 MG tablet Take 1 tablet by mouth daily 3/12/21  Yes Poonam Bernard MD   Multiple Vitamin (MULTIVITAMINS PO) Take 1 tablet by mouth daily    Yes Historical Provider, MD   Ascorbic Acid (VITAMIN C) 500 MG tablet Take 500 mg by mouth daily. Yes Historical Provider, MD   magnesium 30 MG tablet Take 400 mg by mouth daily    Yes Historical Provider, MD   zinc gluconate 50 MG tablet Take 50 mg by mouth daily.    Yes Historical Provider, MD       Current medications:    Current Facility-Administered Medications   Medication Dose Route Frequency Provider Last Rate Last Admin    0.9 % sodium chloride infusion   IntraVENous Continuous Dyana Chapman MD        sodium chloride flush 0.9 % injection 10 mL  10 mL IntraVENous 2 times per day Dyana Chapman MD        sodium chloride flush 0.9 % injection 10 mL  10 mL IntraVENous PRN Dyana Chapman MD        0.9 % sodium chloride infusion  25 mL IntraVENous PRN Dyana Chapman MD           Allergies: Allergies   Allergen Reactions    Other Itching     thermersol-preservative for contact solution-pt states got itchy eyes    Penicillins Hives    Nickel Rash       Problem List:    Patient Active Problem List   Diagnosis Code    Hypercholesteremia due to HDL > 100 E78.00    Low testosterone level in female R79.89    Elevated C-reactive protein (CRP) R79.82    Primary osteoarthritis of first carpometacarpal joint of left hand M18.12    Family history of Alzheimer's disease Z82.0    Eczema L30.9    Acquired underactive thyroid E03.9    Reactive airway disease J45.909    Chronic allergic rhinitis J30.9    Eustachian catarrh, bilateral H68.003    Allergic sinusitis J30.9    Neuritis M79.2    Vasomotor rhinitis J30.0    Diverticulitis K57.92    Trigger middle finger of right hand M65.331    Sigmoid diverticulitis K57.32       Past Medical History:        Diagnosis Date    Abnormal Pap smear of cervix     Acquired underactive thyroid 4/20/2017    Asthma     when have colds goes into asthma    Chronic allergic rhinitis 4/3/2018    Diverticulitis 2006    colon 2006    Family history of Alzheimer's disease 11/15/2016    Fibrocystic breast     Hypercholesteremia due to HDL > 100 10/17/2013    no meds    Irregular uterine bleeding     Menopause ovarian failure     Primary osteoarthritis of first carpometacarpal joint of left hand 9/25/2015       Past Surgical History:        Procedure Laterality Date    BREAST BIOPSY      COLONOSCOPY      x2   Seth Brito liposuction-abdomen and thighs    EYE SURGERY      corrective    FINGER TRIGGER RELEASE Left 5/19/16    Middle Finger    HAND SURGERY Left 5/8/14    Middle Finger Ulnar Digital Nerve contusion and neuropraxia    SMALL INTESTINE SURGERY N/A 11/18/2020    SIGMOID COLECTOMY performed by Radha Ibrahim MD at [address] [place] [address] History:    Social History     Tobacco Use    Smoking status: Never Smoker    Smokeless tobacco: Never Used    Tobacco comment: leonardo   Substance Use Topics    Alcohol use: Yes     Alcohol/week: 5.0 standard drinks     Types: 5 Standard drinks or equivalent per week     Comment: socially                                Counseling given: Not Answered  Comment: leonardo      Vital Signs (Current):   Vitals:    01/20/22 0716   BP: (!) 140/99   Pulse: 103   Resp: 16   Temp: 96.8 °F (36 °C)   TempSrc: Temporal   SpO2: 98%   Weight: 186 lb 4.6 oz (84.5 kg)   Height: 5' 5\" (1.651 m)                                              BP Readings from Last 3 Encounters:   01/20/22 (!) 140/99   01/04/22 132/84   05/24/21 120/78       NPO Status: Time of last liquid consumption: 1900                        Time of last solid consumption: 1900                        Date of last liquid consumption: 01/19/22                        Date of last solid food consumption: 01/19/22    BMI:   Wt Readings from Last 3 Encounters:   01/20/22 186 lb 4.6 oz (84.5 kg)   01/04/22 190 lb (86.2 kg)   08/13/21 189 lb (85.7 kg)     Body mass index is 31 kg/m². CBC:   Lab Results   Component Value Date    WBC 4.2 11/21/2020    RBC 3.80 11/21/2020    HGB 11.3 11/21/2020    HCT 33.3 11/21/2020    MCV 87.6 11/21/2020    RDW 14.8 11/21/2020     11/21/2020       CMP:   Lab Results   Component Value Date     11/21/2020    K 4.1 11/21/2020     11/21/2020    CO2 28 11/21/2020    BUN 8 11/21/2020    CREATININE 0.6 11/21/2020    GFRAA >60 11/21/2020    GFRAA >60 03/22/2012    AGRATIO 1.1 10/04/2020    LABGLOM >60 11/21/2020    GLUCOSE 95 05/25/2021    PROT 7.0 10/07/2020    PROT 6.8 03/22/2012    CALCIUM 8.7 11/21/2020    BILITOT <0.2 10/07/2020    ALKPHOS 78 10/07/2020    AST 18 10/07/2020    ALT 7 10/07/2020       POC Tests: No results for input(s): POCGLU, POCNA, POCK, POCCL, POCBUN, POCHEMO, POCHCT in the last 72 hours.     Coags:   Lab Results   Component Value Date    PROTIME 12.8 10/04/2020    INR 1.10 10/04/2020    APTT 26.2 10/04/2020       HCG (If Applicable):   Lab Results   Component Value Date    PREGTESTUR Negative 05/19/2016        ABGs: No results found for: PHART, PO2ART, QJS0KRN, MMF3VXS, BEART, [id number]     Type & Screen (If Applicable):  No results found for: LABABO, LABRH    Drug/Infectious Status (If Applicable):  No results found for: HIV, HEPCAB    COVID-19 Screening (If Applicable):   Lab Results   Component Value Date    COVID19 Not Detected 01/14/2022           Anesthesia Evaluation  Patient summary reviewed no history of anesthetic complications:   Airway: Mallampati: II  TM distance: >3 FB   Neck ROM: full  Mouth opening: > = 3 FB Dental: normal exam         Pulmonary:Negative Pulmonary ROS   (+) asthma:     (-) shortness of breath and not a current smoker          Patient did not smoke on day of surgery. Cardiovascular:Negative CV ROS    (+) hyperlipidemia    (-) pacemaker, past MI, CABG/stent and  angina       Beta Blocker:  Not on Beta Blocker         Neuro/Psych:   Negative Neuro/Psych ROS     (-) seizures and CVA           GI/Hepatic/Renal: Neg GI/Hepatic/Renal ROS       (-) liver disease and no renal disease       Endo/Other: Negative Endo/Other ROS       (-) diabetes mellitus, hypothyroidism, hyperthyroidism               Abdominal:             Vascular: negative vascular ROS. Other Findings:             Anesthesia Plan      MAC     ASA 2     (This pre-anesthesia assessment may be used as a history and physical.    DOS STAFF ADDENDUM:    Pt seen and examined, chart reviewed (including anesthesia, drug and allergy history). No interval changes to history and physical examination. Anesthetic plan, risks, benefits, alternatives, and personnel involved discussed with patient. Patient verbalized an understanding and agrees to proceed. Edith Cavazos MD  January 20, 2022  7:39 AM    )  Induction: intravenous.       Anesthetic plan and risks discussed with patient. Plan discussed with CRNA.                   Gianna Anguiano MD   1/20/2022

## 2022-01-28 ENCOUNTER — OFFICE VISIT (OUTPATIENT)
Dept: ORTHOPEDIC SURGERY | Age: 57
End: 2022-01-28

## 2022-01-28 VITALS — BODY MASS INDEX: 30.99 KG/M2 | WEIGHT: 186 LBS | HEIGHT: 65 IN | RESPIRATION RATE: 16 BRPM

## 2022-01-28 DIAGNOSIS — M65.341 TRIGGER RING FINGER OF RIGHT HAND: ICD-10-CM

## 2022-01-28 DIAGNOSIS — M65.331 TRIGGER MIDDLE FINGER OF RIGHT HAND: Primary | ICD-10-CM

## 2022-01-28 PROCEDURE — 99024 POSTOP FOLLOW-UP VISIT: CPT | Performed by: ORTHOPAEDIC SURGERY

## 2022-01-28 NOTE — PROGRESS NOTES
Ms. Karina Brewster returns today in follow-up of her recent right Index Finger, Middle Finger and Ring Finger A1 Pulley (Trigger Finger) Release done approximately 1 week ago. She has done well noting no discomfort and no other reported complications. She notes pre-operative symptoms to be completely resolved at this time. Physical Exam:  Bandage intact well cared for removed today by RN  Skin incision is healing well. Digital range of motion is full in the Index Finger, Middle Finger and Ring Finger, normal in all other digits. Wrist range of motion is full. Sensation is normal in the Whole Hand. Vascular examination reveals normal, good capillary refill and good color. Swelling is minimal.  Her preoperative triggering is completely resolved. Impression:  Ms. Karina Brewster is doing well after recent right Index Finger, Middle Finger and Ring Finger Trigger Finger Release. Plan:  Ms. Karina Brewster is instructed in work on Active & Passive range of motion of the digits, wrist, & elbow. These modalities were specifically demonstrated to her today. We discussed the appropriateness of gradual resumption of use of the operated hand and the return to normal use as comfort allows. She is given instructions regarding management of the fresh surgical incision and progressive use of desensitization and tissue massage techniques. We discussed the appropriate expectations and timeline for symptom improvement. She is provided a written patient instruction sheet titled: Postoperative Instructions After Trigger Finger Release. I have asked Ms. Fanta Caldwell to follow-up with me or contact me by telephone over the next 2-4 weeks if her symptoms have not fully resolved or if she has not regained full & painless return of function. She is also specifically instructed to return to the office or call for an appointment sooner if her symptoms are changing or worsening prior to that time.

## 2022-01-31 DIAGNOSIS — E03.9 ACQUIRED UNDERACTIVE THYROID: ICD-10-CM

## 2022-01-31 RX ORDER — LIOTHYRONINE SODIUM 5 UG/1
TABLET ORAL
Qty: 180 TABLET | Refills: 0 | Status: SHIPPED | OUTPATIENT
Start: 2022-01-31 | End: 2022-05-23

## 2022-03-04 DIAGNOSIS — F41.9 ANXIETY: ICD-10-CM

## 2022-03-04 RX ORDER — BUPROPION HYDROCHLORIDE 150 MG/1
TABLET ORAL
Qty: 90 TABLET | Refills: 0 | Status: SHIPPED | OUTPATIENT
Start: 2022-03-04 | End: 2022-05-23

## 2022-03-07 ENCOUNTER — OFFICE VISIT (OUTPATIENT)
Dept: GYNECOLOGY | Age: 57
End: 2022-03-07
Payer: COMMERCIAL

## 2022-03-07 VITALS
RESPIRATION RATE: 17 BRPM | HEIGHT: 64 IN | DIASTOLIC BLOOD PRESSURE: 68 MMHG | SYSTOLIC BLOOD PRESSURE: 118 MMHG | HEART RATE: 64 BPM | WEIGHT: 189.5 LBS | BODY MASS INDEX: 32.35 KG/M2

## 2022-03-07 DIAGNOSIS — Z01.419 WELL WOMAN EXAM WITH ROUTINE GYNECOLOGICAL EXAM: Primary | ICD-10-CM

## 2022-03-07 PROCEDURE — 99396 PREV VISIT EST AGE 40-64: CPT | Performed by: OBSTETRICS & GYNECOLOGY

## 2022-03-07 RX ORDER — ESTRADIOL 1 MG/1
1 TABLET ORAL DAILY
Qty: 90 TABLET | Refills: 0 | Status: SHIPPED | OUTPATIENT
Start: 2022-03-07 | End: 2022-05-31

## 2022-03-07 RX ORDER — PROGESTERONE 100 MG/1
100 CAPSULE ORAL NIGHTLY
Qty: 90 CAPSULE | Refills: 0 | Status: SHIPPED | OUTPATIENT
Start: 2022-03-07 | End: 2022-05-31

## 2022-03-07 ASSESSMENT — ENCOUNTER SYMPTOMS
GASTROINTESTINAL NEGATIVE: 1
RESPIRATORY NEGATIVE: 1
EYES NEGATIVE: 1

## 2022-03-08 NOTE — PROGRESS NOTES
Subjective:      Patient ID: Ricarda Butterfield is a 62 y.o. female. Patient is here for annual. Patient in menopause. Patient with hot flashes. Tried pellets a few years ago with progesterone and had some bleeding. Did feel a little better on, though. Gynecologic Exam        Review of Systems   Constitutional: Negative. HENT: Negative. Eyes: Negative. Respiratory: Negative. Cardiovascular: Negative. Gastrointestinal: Negative. Genitourinary: Negative. Musculoskeletal: Negative. Skin: Negative. Neurological: Negative. Psychiatric/Behavioral: Negative.       Date of Birth 1965  Past Medical History:   Diagnosis Date    Abnormal Pap smear of cervix     Acquired underactive thyroid 2017    Asthma     when have colds goes into asthma    Chronic allergic rhinitis 4/3/2018    Diverticulitis 2006    colon 2006    Family history of Alzheimer's disease 11/15/2016    Fibrocystic breast     Hypercholesteremia due to HDL > 100 10/17/2013    no meds    Irregular uterine bleeding     Menopause ovarian failure     Primary osteoarthritis of first carpometacarpal joint of left hand 2015     Past Surgical History:   Procedure Laterality Date    BREAST BIOPSY      COLONOSCOPY      x2   Lul Cheryl      Dr. Andrew Barillas liposuction-abdomen and thighs    EYE SURGERY      corrective    FINGER TRIGGER RELEASE Left 16    Middle Finger    FINGER TRIGGER RELEASE Right 2022    RIGHT INDEX FINGER, MIDDLE FINGER AND RING FINGER TRIGGER FINGER RELEASE performed by Demetrio Sanchez MD at [facility] Left 14    Middle Finger Ulnar Digital Nerve contusion and neuropraxia    SMALL INTESTINE SURGERY N/A 2020    SIGMOID COLECTOMY performed by Tiffany Parker MD at [facility]     OB History    Para Term  AB Living   0 0 0 0 0 0   SAB IAB Ectopic Molar Multiple Live Births   0 0 0 0 0 0     Social History     Socioeconomic History    Marital status: Single     Spouse name: Not on file    Number of children: 0    Years of education: Not on file    Highest education level: Not on file   Occupational History    Occupation: Physical Therapist     Employer: [address] [place] [address]: [employer]   Tobacco Use    Smoking status: Never Smoker    Smokeless tobacco: Never Used    Tobacco comment: congrats   Vaping Use    Vaping Use: Never used   Substance and Sexual Activity    Alcohol use: Yes     Alcohol/week: 5.0 standard drinks     Types: 5 Standard drinks or equivalent per week     Comment: socially    Drug use: Never    Sexual activity: Yes     Partners: Male     Birth control/protection: Condom   Other Topics Concern    Not on file   Social History Narrative    Self-breast exams: yes. Exercise: walking and cardiovascular equipment three times a week. Seatbelt use: Always    Physical therapist     Social Determinants of Health     Financial Resource Strain:     Difficulty of Paying Living Expenses: Not on file   Food Insecurity:     Worried About Running Out of Food in the Last Year: Not on file    Didi of Food in the Last Year: Not on file   Transportation Needs:     Lack of Transportation (Medical): Not on file    Lack of Transportation (Non-Medical):  Not on file   Physical Activity:     Days of Exercise per Week: Not on file    Minutes of Exercise per Session: Not on file   Stress:     Feeling of Stress : Not on file   Social Connections:     Frequency of Communication with Friends and Family: Not on file    Frequency of Social Gatherings with Friends and Family: Not on file    Attends Hoahaoism Services: Not on file    Active Member of Clubs or Organizations: Not on file    Attends Club or Organization Meetings: Not on file    Marital Status: Not on file   Intimate Partner Violence:     Fear of Current or Ex-Partner: Not on file    Emotionally Abused: Not on file    Physically Abused: Not on file  Sexually Abused: Not on file   Housing Stability:     Unable to Pay for Housing in the Last Year: Not on file    Number of Places Lived in the Last Year: Not on file    Unstable Housing in the Last Year: Not on file     Allergies   Allergen Reactions    Penicillins Hives    Other Itching     thermersol-preservative for contact solution-pt states got itchy eyes    Nickel Rash     Outpatient Medications Marked as Taking for the 3/7/22 encounter (Office Visit) with Carlos De La Rosa MD   Medication Sig Dispense Refill    estradiol (ESTRACE) 1 MG tablet Take 1 tablet by mouth daily 90 tablet 0    progesterone (PROMETRIUM) 100 MG CAPS capsule Take 1 capsule by mouth nightly 90 capsule 0    buPROPion (WELLBUTRIN XL) 150 MG extended release tablet TAKE ONE TABLET BY MOUTH EVERY MORNING 90 tablet 0    liothyronine (CYTOMEL) 5 MCG tablet TAKE TWO TABLETS BY MOUTH EVERY  tablet 0    loratadine (CLARITIN) 10 MG capsule Take 10 mg by mouth daily      Multiple Vitamin (MULTIVITAMINS PO) Take 1 tablet by mouth daily       Ascorbic Acid (VITAMIN C) 500 MG tablet Take 500 mg by mouth daily.  magnesium 30 MG tablet Take 400 mg by mouth daily       zinc gluconate 50 MG tablet Take 50 mg by mouth daily. Family History   Problem Relation Age of Onset    Cancer Mother         uterine sarcoma, skin non-melanoma    Stroke Mother     Alzheimer's Disease Mother     Cancer Father         skin non-melanoma    Alzheimer's Disease Father      /68 (Site: Left Upper Arm, Position: Sitting, Cuff Size: Medium Adult)   Pulse 64   Resp 17   Ht 5' 4\" (1.626 m)   Wt 189 lb 8 oz (86 kg)   LMP 12/15/2015   BMI 32.53 kg/m²       Objective:   Physical Exam  Constitutional:       General: She is not in acute distress. Appearance: Normal appearance. She is well-developed and normal weight. She is not diaphoretic. HENT:      Head: Normocephalic and atraumatic.       Nose: Nose normal. Mouth/Throat:      Mouth: Mucous membranes are moist.      Pharynx: Oropharynx is clear. Eyes:      Pupils: Pupils are equal, round, and reactive to light. Neck:      Thyroid: No thyromegaly. Cardiovascular:      Rate and Rhythm: Normal rate and regular rhythm. Heart sounds: Normal heart sounds. No murmur heard. No friction rub. No gallop. Pulmonary:      Effort: Pulmonary effort is normal. No respiratory distress. Breath sounds: Normal breath sounds. No wheezing or rales. Chest:   Breasts:      Right: Normal. No swelling, bleeding, inverted nipple, mass, nipple discharge, skin change or tenderness. Left: Normal. No swelling, bleeding, inverted nipple, mass, nipple discharge, skin change or tenderness. Abdominal:      General: Abdomen is flat. Bowel sounds are normal. There is no distension. Palpations: Abdomen is soft. There is no hepatomegaly or mass. Tenderness: There is no abdominal tenderness. There is no guarding or rebound. Hernia: No hernia is present. There is no hernia in the left inguinal area. Genitourinary:     General: Normal vulva. Exam position: Lithotomy position. Pubic Area: No rash. Labia:         Right: No rash, tenderness, lesion or injury. Left: No rash, tenderness, lesion or injury. Urethra: No prolapse, urethral pain, urethral swelling or urethral lesion. Vagina: Normal. No signs of injury and foreign body. No vaginal discharge, erythema, tenderness or bleeding. Cervix: No cervical motion tenderness, discharge, friability, lesion, erythema, cervical bleeding or eversion. Uterus: Not deviated, not enlarged, not fixed, not tender and no uterine prolapse. Adnexa:         Right: No mass, tenderness or fullness. Left: No mass, tenderness or fullness. Rectum: Normal. Guaiac result negative. No mass, tenderness, anal fissure, external hemorrhoid or internal hemorrhoid.  Normal anal tone.      Comments: Normal urethral meatus, nl urethra, nl bladder. Musculoskeletal:         General: No tenderness. Normal range of motion. Cervical back: Normal range of motion and neck supple. No rigidity. Lymphadenopathy:      Cervical: No cervical adenopathy. Lower Body: No right inguinal adenopathy. No left inguinal adenopathy. Skin:     General: Skin is warm and dry. Findings: No erythema or rash. Neurological:      General: No focal deficit present. Mental Status: She is alert and oriented to person, place, and time. Deep Tendon Reflexes: Reflexes are normal and symmetric. Psychiatric:         Mood and Affect: Mood normal.         Behavior: Behavior normal.         Thought Content: Thought content normal.         Judgment: Judgment normal.         Assessment:      1. Annual  2. menopause      Plan:      1. Pap, calcium, exercise, mammogram, hemocult negative  2. Stable-Discussed HRT again but lower dose and daily dosing. Call if has bleeding on this dose. WHI and VTE discussed with patient. If happy with this, will reassess at 61 yoa.         Cuauhtemoc Hernandez MD

## 2022-03-23 ENCOUNTER — HOSPITAL ENCOUNTER (OUTPATIENT)
Dept: WOMENS IMAGING | Age: 57
Discharge: HOME | End: 2022-03-23
Payer: COMMERCIAL

## 2022-03-23 DIAGNOSIS — Z01.419 WELL WOMAN EXAM WITH ROUTINE GYNECOLOGICAL EXAM: ICD-10-CM

## 2022-03-23 PROCEDURE — 77063 BREAST TOMOSYNTHESIS BI: CPT

## 2022-05-05 ENCOUNTER — PATIENT MESSAGE (OUTPATIENT)
Dept: FAMILY MEDICINE CLINIC | Age: 57
End: 2022-05-05

## 2022-05-05 DIAGNOSIS — L40.9 PSORIASIS: Primary | ICD-10-CM

## 2022-05-05 DIAGNOSIS — E03.9 ACQUIRED UNDERACTIVE THYROID: ICD-10-CM

## 2022-05-05 DIAGNOSIS — M25.50 POLYARTHRALGIA: ICD-10-CM

## 2022-05-05 DIAGNOSIS — E78.00 HYPERCHOLESTEREMIA: ICD-10-CM

## 2022-05-05 DIAGNOSIS — R79.82 ELEVATED C-REACTIVE PROTEIN (CRP): ICD-10-CM

## 2022-05-10 ENCOUNTER — OFFICE VISIT (OUTPATIENT)
Dept: FAMILY MEDICINE CLINIC | Age: 57
End: 2022-05-10
Payer: COMMERCIAL

## 2022-05-10 VITALS
DIASTOLIC BLOOD PRESSURE: 98 MMHG | RESPIRATION RATE: 18 BRPM | BODY MASS INDEX: 32.53 KG/M2 | SYSTOLIC BLOOD PRESSURE: 144 MMHG | OXYGEN SATURATION: 97 % | HEART RATE: 87 BPM | HEIGHT: 64 IN

## 2022-05-10 DIAGNOSIS — J01.90 ACUTE VIRAL SINUSITIS: ICD-10-CM

## 2022-05-10 DIAGNOSIS — B97.89 ACUTE VIRAL SINUSITIS: ICD-10-CM

## 2022-05-10 DIAGNOSIS — I10 HYPERTENSION, UNSPECIFIED TYPE: Primary | ICD-10-CM

## 2022-05-10 PROCEDURE — 99213 OFFICE O/P EST LOW 20 MIN: CPT

## 2022-05-10 RX ORDER — HYDROCHLOROTHIAZIDE 12.5 MG/1
12.5 CAPSULE, GELATIN COATED ORAL EVERY MORNING
Qty: 90 CAPSULE | Refills: 1 | Status: SHIPPED | OUTPATIENT
Start: 2022-05-10 | End: 2022-06-09

## 2022-05-10 ASSESSMENT — PATIENT HEALTH QUESTIONNAIRE - PHQ9
SUM OF ALL RESPONSES TO PHQ9 QUESTIONS 1 & 2: 0
1. LITTLE INTEREST OR PLEASURE IN DOING THINGS: 0
SUM OF ALL RESPONSES TO PHQ QUESTIONS 1-9: 0
2. FEELING DOWN, DEPRESSED OR HOPELESS: 0
SUM OF ALL RESPONSES TO PHQ QUESTIONS 1-9: 0

## 2022-05-10 NOTE — PATIENT INSTRUCTIONS
You have a viral upper respiratory infection. Take Mucinex DM or Robitussin DM as needed for cough and congestion. You can also try Flonase Allergy 2 sprays to each nostril once a day for congestion. You can also try saline nose spray for stuffy nose. You can take Claritin or Zyrtec as needed for drainage. Take over-the-counter acetaminophen and/or ibuprofen for pain and fever. You can try throat lozenges or salt water gargles for any sore throat. Drink lots of fluid and get plenty of rest.  Your symptoms may take 7-10 days to resolve. Contact us if you have a high fever (102 degrees F), shortness of breath, significant pain somewhere, inability to keep liquids down, or other worsening symptoms.       Start 12.5mg Hydrochlorothiazide for BP and edema  Follow up as scheduled for physical with Dr. Josephine Weeks

## 2022-05-10 NOTE — PROGRESS NOTES
5/10/2022    This is a 62 y.o. female   Chief Complaint   Patient presents with    Laryngitis     Pt said starting last monday she started with laryngitis. Pt said that she has a cough but its more of a dry cough. Pt said that during the morning she will cough some things up but she thinks that is from the night.  Other     Pt said that she is worried about her blood pressure. Pt said that she has been taking it for awhile. Pt said that over the last 2 weeks she has noticed that it has gotten alot worse. Pt said the diasystolic number had been higher. Patel Jewell is here today with complaints of laryngitis. She states that she lost her voice last Monday. She wakes with cough and congestion that improve throughout the day. She denies fevers, sinus pressure/pain. She has been taking mucinex DM with good improvement. She is also concerned about her BP: she reports that her DBP has been increasing. She takes her BP at home 135-158/.     Does not eat a lot of sodium  Has not been exercising much  Has mild ankle edema       Patient Active Problem List   Diagnosis    Hypercholesteremia due to HDL > 100    Low testosterone level in female    Elevated C-reactive protein (CRP)    Primary osteoarthritis of first carpometacarpal joint of left hand    Family history of Alzheimer's disease    Psoriasis    Acquired underactive thyroid    Reactive airway disease    Chronic allergic rhinitis    Eustachian catarrh, bilateral    Allergic sinusitis    Neuritis    Vasomotor rhinitis    Diverticulitis    Trigger middle finger of right hand    Sigmoid diverticulitis    Trigger finger, acquired          Current Outpatient Medications   Medication Sig Dispense Refill    hydroCHLOROthiazide (MICROZIDE) 12.5 MG capsule Take 1 capsule by mouth every morning 90 capsule 1    estradiol (ESTRACE) 1 MG tablet Take 1 tablet by mouth daily 90 tablet 0    progesterone (PROMETRIUM) 100 MG CAPS capsule Take 1 capsule by mouth nightly 90 capsule 0    buPROPion (WELLBUTRIN XL) 150 MG extended release tablet TAKE ONE TABLET BY MOUTH EVERY MORNING 90 tablet 0    liothyronine (CYTOMEL) 5 MCG tablet TAKE TWO TABLETS BY MOUTH EVERY  tablet 0    loratadine (CLARITIN) 10 MG capsule Take 10 mg by mouth daily      Multiple Vitamin (MULTIVITAMINS PO) Take 1 tablet by mouth daily       Ascorbic Acid (VITAMIN C) 500 MG tablet Take 500 mg by mouth daily.  magnesium 30 MG tablet Take 400 mg by mouth daily       zinc gluconate 50 MG tablet Take 50 mg by mouth daily. No current facility-administered medications for this visit. Allergies   Allergen Reactions    Penicillins Hives    Other Itching     thermersol-preservative for contact solution-pt states got itchy eyes    Nickel Rash       Review of Systems    Vitals:    05/10/22 1044 05/10/22 1123   BP: (!) 144/98 (!) 144/98   Site: Right Upper Arm Right Upper Arm   Position: Sitting Sitting   Cuff Size: Large Adult Large Adult   Pulse: 87    Resp: 18    SpO2: 97%    Height: 5' 4\" (1.626 m)        Body mass index is 32.53 kg/m². Wt Readings from Last 3 Encounters:   03/07/22 189 lb 8 oz (86 kg)   01/28/22 186 lb (84.4 kg)   01/20/22 186 lb 4.6 oz (84.5 kg)       BP Readings from Last 3 Encounters:   05/10/22 (!) 144/98   03/07/22 118/68   01/20/22 120/77       Physical Exam  Vitals reviewed. Constitutional:       General: She is not in acute distress. Appearance: Normal appearance. HENT:      Head: Normocephalic and atraumatic. Right Ear: Tympanic membrane, ear canal and external ear normal. There is no impacted cerumen. Left Ear: Tympanic membrane, ear canal and external ear normal. There is no impacted cerumen. Nose: Congestion and rhinorrhea present. Mouth/Throat:      Mouth: Mucous membranes are moist.      Pharynx: Oropharynx is clear. No oropharyngeal exudate or posterior oropharyngeal erythema.    Eyes:      General: Right eye: No discharge. Left eye: No discharge. Extraocular Movements: Extraocular movements intact. Conjunctiva/sclera: Conjunctivae normal.      Pupils: Pupils are equal, round, and reactive to light. Cardiovascular:      Rate and Rhythm: Normal rate and regular rhythm. Heart sounds: Normal heart sounds. No murmur heard. No friction rub. No gallop. Pulmonary:      Effort: Pulmonary effort is normal. No respiratory distress. Breath sounds: Normal breath sounds. Musculoskeletal:         General: Normal range of motion. Right lower le+ Edema present. Left lower le+ Edema present. Skin:     General: Skin is warm and dry. Neurological:      Mental Status: She is oriented to person, place, and time. Psychiatric:         Behavior: Behavior normal.         Thought Content: Thought content normal.         Judgment: Judgment normal.         Assessmentand Plan  Fanta was seen today for laryngitis and other. Diagnoses and all orders for this visit:    Hypertension, unspecified type  Will start HCTZ today given ankle edema and follow up with Dr. Dede Carvalho as scheduled for physical  -     hydroCHLOROthiazide (MICROZIDE) 12.5 MG capsule; Take 1 capsule by mouth every morning    Acute viral sinusitis  You have a viral infection. Take Mucinex DM or Robitussin DM as needed for cough and congestion. You can also try Flonase Allergy 2 sprays to each nostril once a day for congestion. You can also try saline nose spray for stuffy nose. You can take Claritin or Zyrtec as needed for drainage. Take over-the-counter acetaminophen and/or ibuprofen for pain and fever. You can try throat lozenges or salt water gargles for any sore throat. Drink lots of fluid and get plenty of rest.  Your symptoms may take 7-10 days to resolve.   Contact us if you have a high fever (102 degrees F), shortness of breath, significant pain somewhere, inability to keep liquids down, or other worsening symptoms. Return in 30 days (on 6/9/2022) for with Dr. Anjana Brewster.

## 2022-05-17 ENCOUNTER — HOSPITAL ENCOUNTER (EMERGENCY)
Age: 57
Discharge: HOME | End: 2022-05-17
Attending: EMERGENCY MEDICINE
Payer: COMMERCIAL

## 2022-05-17 ENCOUNTER — APPOINTMENT (OUTPATIENT)
Dept: GENERAL RADIOLOGY | Age: 57
End: 2022-05-17
Payer: COMMERCIAL

## 2022-05-17 VITALS
RESPIRATION RATE: 16 BRPM | TEMPERATURE: 98 F | OXYGEN SATURATION: 96 % | SYSTOLIC BLOOD PRESSURE: 148 MMHG | BODY MASS INDEX: 34.63 KG/M2 | DIASTOLIC BLOOD PRESSURE: 95 MMHG | WEIGHT: 201.72 LBS | HEART RATE: 73 BPM

## 2022-05-17 DIAGNOSIS — R07.89 CHEST WALL PAIN: ICD-10-CM

## 2022-05-17 DIAGNOSIS — M79.602 LEFT ARM PAIN: ICD-10-CM

## 2022-05-17 DIAGNOSIS — T14.8XXA HEMATOMA: ICD-10-CM

## 2022-05-17 DIAGNOSIS — T07.XXXA ABRASIONS OF MULTIPLE SITES: ICD-10-CM
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
## 2022-05-17 LAB
A/G RATIO: 1.6 (ref 1.1–2.2)
ALBUMIN SERPL-MCNC: 4.4 G/DL (ref 3.4–5)
ALP BLD-CCNC: 93 U/L (ref 40–129)
ALT SERPL-CCNC: 12 U/L (ref 10–40)
ANION GAP SERPL CALCULATED.3IONS-SCNC: 14 MMOL/L (ref 3–16)
AST SERPL-CCNC: 24 U/L (ref 15–37)
BASOPHILS ABSOLUTE: 0.1 K/UL (ref 0–0.2)
BASOPHILS RELATIVE PERCENT: 1 %
BILIRUB SERPL-MCNC: 0.3 MG/DL (ref 0–1)
BILIRUBIN URINE: NEGATIVE
BLOOD, URINE: NEGATIVE
BUN BLDV-MCNC: 17 MG/DL (ref 7–20)
CALCIUM SERPL-MCNC: 9.3 MG/DL (ref 8.3–10.6)
CHLORIDE BLD-SCNC: 101 MMOL/L (ref 99–110)
CLARITY: CLEAR
CO2: 23 MMOL/L (ref 21–32)
COLOR: YELLOW
CREAT SERPL-MCNC: 0.8 MG/DL (ref 0.6–1.1)
EOSINOPHILS ABSOLUTE: 0.1 K/UL (ref 0–0.6)
EOSINOPHILS RELATIVE PERCENT: 1.8 %
GFR AFRICAN AMERICAN: >60
GFR NON-AFRICAN AMERICAN: >60
GLUCOSE BLD-MCNC: 119 MG/DL (ref 70–99)
GLUCOSE URINE: NEGATIVE MG/DL
HCT VFR BLD CALC: 40.7 % (ref 36–48)
HEMOGLOBIN: 13.8 G/DL (ref 12–16)
INR BLD: 0.92 (ref 0.88–1.12)
KETONES, URINE: NEGATIVE MG/DL
LEUKOCYTE ESTERASE, URINE: NEGATIVE
LYMPHOCYTES ABSOLUTE: 1.2 K/UL (ref 1–5.1)
LYMPHOCYTES RELATIVE PERCENT: 22.8 %
MCH RBC QN AUTO: 28.8 PG (ref 26–34)
MCHC RBC AUTO-ENTMCNC: 33.9 G/DL (ref 31–36)
MCV RBC AUTO: 84.7 FL (ref 80–100)
MICROSCOPIC EXAMINATION: NORMAL
MONOCYTES ABSOLUTE: 0.3 K/UL (ref 0–1.3)
MONOCYTES RELATIVE PERCENT: 6.4 %
NEUTROPHILS ABSOLUTE: 3.5 K/UL (ref 1.7–7.7)
NEUTROPHILS RELATIVE PERCENT: 68 %
NITRITE, URINE: NEGATIVE
PDW BLD-RTO: 14 % (ref 12.4–15.4)
PH UA: 5.5 (ref 5–8)
PLATELET # BLD: 294 K/UL (ref 135–450)
PMV BLD AUTO: 6.6 FL (ref 5–10.5)
POTASSIUM REFLEX MAGNESIUM: 4 MMOL/L (ref 3.5–5.1)
PROTEIN UA: NEGATIVE MG/DL
PROTHROMBIN TIME: 10.4 SEC (ref 9.9–12.7)
RBC # BLD: 4.8 M/UL (ref 4–5.2)
SODIUM BLD-SCNC: 138 MMOL/L (ref 136–145)
SPECIFIC GRAVITY UA: 1.02 (ref 1–1.03)
TOTAL PROTEIN: 7.2 G/DL (ref 6.4–8.2)
URINE REFLEX TO CULTURE: NORMAL
URINE TYPE: NORMAL
UROBILINOGEN, URINE: 0.2 E.U./DL
WBC # BLD: 5.2 K/UL (ref 4–11)

## 2022-05-17 PROCEDURE — 71046 X-RAY EXAM CHEST 2 VIEWS: CPT

## 2022-05-17 PROCEDURE — 73080 X-RAY EXAM OF ELBOW: CPT

## 2022-05-17 PROCEDURE — 81003 URINALYSIS AUTO W/O SCOPE: CPT

## 2022-05-17 PROCEDURE — 73110 X-RAY EXAM OF WRIST: CPT

## 2022-05-17 PROCEDURE — 6370000000 HC RX 637 (ALT 250 FOR IP): Performed by: EMERGENCY MEDICINE

## 2022-05-17 PROCEDURE — 85610 PROTHROMBIN TIME: CPT

## 2022-05-17 PROCEDURE — 36415 COLL VENOUS BLD VENIPUNCTURE: CPT

## 2022-05-17 PROCEDURE — 80053 COMPREHEN METABOLIC PANEL: CPT

## 2022-05-17 PROCEDURE — 6360000002 HC RX W HCPCS: Performed by: EMERGENCY MEDICINE

## 2022-05-17 PROCEDURE — 85025 COMPLETE CBC W/AUTO DIFF WBC: CPT

## 2022-05-17 PROCEDURE — 96374 THER/PROPH/DIAG INJ IV PUSH: CPT

## 2022-05-17 PROCEDURE — 99284 EMERGENCY DEPT VISIT MOD MDM: CPT

## 2022-05-17 PROCEDURE — 73130 X-RAY EXAM OF HAND: CPT

## 2022-05-17 RX ORDER — LIDOCAINE 50 MG/G
1 PATCH TOPICAL DAILY
Qty: 10 PATCH | Refills: 0 | Status: SHIPPED | OUTPATIENT
Start: 2022-05-17 | End: 2022-05-27

## 2022-05-17 RX ORDER — ACETAMINOPHEN 325 MG/1
650 TABLET ORAL ONCE
Status: COMPLETED | OUTPATIENT
Start: 2022-05-17 | End: 2022-05-17

## 2022-05-17 RX ORDER — DIAZEPAM 5 MG/1
5 TABLET ORAL ONCE
Status: COMPLETED | OUTPATIENT
Start: 2022-05-17 | End: 2022-05-17

## 2022-05-17 RX ORDER — IBUPROFEN 400 MG/1
400 TABLET ORAL EVERY 4 HOURS PRN
Qty: 60 TABLET | Refills: 1 | Status: SHIPPED | OUTPATIENT
Start: 2022-05-17 | End: 2022-06-09

## 2022-05-17 RX ORDER — ACETAMINOPHEN 325 MG/1
650 TABLET ORAL EVERY 4 HOURS PRN
Qty: 60 TABLET | Refills: 1 | Status: SHIPPED | OUTPATIENT
Start: 2022-05-17 | End: 2022-06-09

## 2022-05-17 RX ORDER — KETOROLAC TROMETHAMINE 30 MG/ML
15 INJECTION, SOLUTION INTRAMUSCULAR; INTRAVENOUS ONCE
Status: COMPLETED | OUTPATIENT
Start: 2022-05-17 | End: 2022-05-17

## 2022-05-17 RX ORDER — LIDOCAINE 4 G/G
1 PATCH TOPICAL ONCE
Status: DISCONTINUED | OUTPATIENT
Start: 2022-05-17 | End: 2022-05-17 | Stop reason: HOSPADM

## 2022-05-17 RX ORDER — BACLOFEN 10 MG/1
10 TABLET ORAL 3 TIMES DAILY PRN
Qty: 12 TABLET | Refills: 0 | Status: SHIPPED | OUTPATIENT
Start: 2022-05-17 | End: 2022-05-21

## 2022-05-17 RX ADMIN — DIAZEPAM 5 MG: 5 TABLET ORAL at 07:55

## 2022-05-17 RX ADMIN — KETOROLAC TROMETHAMINE 15 MG: 30 INJECTION, SOLUTION INTRAMUSCULAR at 08:47

## 2022-05-17 RX ADMIN — ACETAMINOPHEN 650 MG: 325 TABLET ORAL at 07:56

## 2022-05-17 ASSESSMENT — PAIN SCALES - GENERAL
PAINLEVEL_OUTOF10: 8
PAINLEVEL_OUTOF10: 8

## 2022-05-17 ASSESSMENT — PAIN - FUNCTIONAL ASSESSMENT: PAIN_FUNCTIONAL_ASSESSMENT: 0-10

## 2022-05-17 ASSESSMENT — PAIN DESCRIPTION - FREQUENCY: FREQUENCY: INTERMITTENT

## 2022-05-17 ASSESSMENT — PAIN DESCRIPTION - PAIN TYPE: TYPE: ACUTE PAIN

## 2022-05-17 ASSESSMENT — PAIN DESCRIPTION - LOCATION: LOCATION: CHEST;ARM

## 2022-05-17 ASSESSMENT — PAIN DESCRIPTION - DESCRIPTORS: DESCRIPTORS: SORE

## 2022-05-17 ASSESSMENT — PAIN DESCRIPTION - ORIENTATION: ORIENTATION: LEFT
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
## 2022-05-17 NOTE — Clinical Note
Frantz Leonard was seen and treated in our emergency department on 5/17/2022. She may return to work on 05/23/2022. If you have any questions or concerns, please don't hesitate to call.       Hola Velasco MD
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
## 2022-05-22 DIAGNOSIS — E03.9 ACQUIRED UNDERACTIVE THYROID: ICD-10-CM

## 2022-05-22 DIAGNOSIS — F41.9 ANXIETY: ICD-10-CM

## 2022-05-23 RX ORDER — LIOTHYRONINE SODIUM 5 UG/1
TABLET ORAL
Qty: 180 TABLET | Refills: 0 | Status: SHIPPED | OUTPATIENT
Start: 2022-05-23 | End: 2022-08-18

## 2022-05-23 RX ORDER — BUPROPION HYDROCHLORIDE 150 MG/1
TABLET ORAL
Qty: 90 TABLET | Refills: 0 | Status: SHIPPED | OUTPATIENT
Start: 2022-05-23 | End: 2022-08-18

## 2022-05-24 ENCOUNTER — TELEPHONE (OUTPATIENT)
Dept: ENT CLINIC | Age: 57
End: 2022-05-24

## 2022-05-24 NOTE — TELEPHONE ENCOUNTER
Fax received from [address] [place] [address]:     Last Office Visit:  Visit date not found     Next Scheduled Visit : Visit date not found     Medication Requested: Ipratropium Bromide 0.03% Soln 0.03    Pharmacy:      [address], [place] [address], [address] [place] [address]  Phone: [phone] Fax: [phone]           Fax attached to encounter

## 2022-05-31 RX ORDER — IPRATROPIUM BROMIDE 21 UG/1
SPRAY, METERED NASAL
Qty: 30 ML | Refills: 3 | OUTPATIENT
Start: 2022-05-31

## 2022-05-31 RX ORDER — ESTRADIOL 1 MG/1
TABLET ORAL
Qty: 90 TABLET | Refills: 3 | Status: SHIPPED | OUTPATIENT
Start: 2022-05-31

## 2022-05-31 RX ORDER — PROGESTERONE 100 MG/1
CAPSULE ORAL
Qty: 90 CAPSULE | Refills: 3 | Status: SHIPPED | OUTPATIENT
Start: 2022-05-31

## 2022-05-31 NOTE — TELEPHONE ENCOUNTER
Pharmacy calling in to check the status of this request for 2 medications. Please contact pharmacy.        [address], [facility], [id number]

## 2022-06-01 DIAGNOSIS — R79.82 ELEVATED C-REACTIVE PROTEIN (CRP): ICD-10-CM

## 2022-06-01 DIAGNOSIS — L40.9 PSORIASIS: ICD-10-CM

## 2022-06-01 DIAGNOSIS — M25.50 POLYARTHRALGIA: ICD-10-CM

## 2022-06-01 DIAGNOSIS — E03.9 ACQUIRED UNDERACTIVE THYROID: ICD-10-CM

## 2022-06-01 DIAGNOSIS — E78.00 HYPERCHOLESTEREMIA: ICD-10-CM

## 2022-06-01 LAB
A/G RATIO: 1.8 (ref 1.1–2.2)
ALBUMIN SERPL-MCNC: 4.3 G/DL (ref 3.4–5)
ALP BLD-CCNC: 98 U/L (ref 40–129)
ALT SERPL-CCNC: 11 U/L (ref 10–40)
ANION GAP SERPL CALCULATED.3IONS-SCNC: 14 MMOL/L (ref 3–16)
ANTI-NUCLEAR ANTIBODY (ANA): NEGATIVE
AST SERPL-CCNC: 23 U/L (ref 15–37)
BASOPHILS ABSOLUTE: 0 K/UL (ref 0–0.2)
BASOPHILS RELATIVE PERCENT: 1 %
BILIRUB SERPL-MCNC: <0.2 MG/DL (ref 0–1)
BUN BLDV-MCNC: 17 MG/DL (ref 7–20)
C-REACTIVE PROTEIN: 18.2 MG/L (ref 0–5.1)
CALCIUM SERPL-MCNC: 9.5 MG/DL (ref 8.3–10.6)
CHLORIDE BLD-SCNC: 99 MMOL/L (ref 99–110)
CHOLESTEROL, TOTAL: 228 MG/DL (ref 0–199)
CO2: 24 MMOL/L (ref 21–32)
CREAT SERPL-MCNC: 0.8 MG/DL (ref 0.6–1.1)
CYCLIC CITRULLINATED PEPTIDE ANTIBODY IGG: <0.5 U/ML (ref 0–2.9)
EOSINOPHILS ABSOLUTE: 0.1 K/UL (ref 0–0.6)
EOSINOPHILS RELATIVE PERCENT: 2 %
GFR AFRICAN AMERICAN: >60
GFR NON-AFRICAN AMERICAN: >60
GLUCOSE BLD-MCNC: 104 MG/DL (ref 70–99)
HCT VFR BLD CALC: 38.3 % (ref 36–48)
HDLC SERPL-MCNC: 70 MG/DL (ref 40–60)
HEMOGLOBIN: 13.1 G/DL (ref 12–16)
LDL CHOLESTEROL CALCULATED: 120 MG/DL
LYMPHOCYTES ABSOLUTE: 1.3 K/UL (ref 1–5.1)
LYMPHOCYTES RELATIVE PERCENT: 28.9 %
MCH RBC QN AUTO: 29.6 PG (ref 26–34)
MCHC RBC AUTO-ENTMCNC: 34.2 G/DL (ref 31–36)
MCV RBC AUTO: 86.6 FL (ref 80–100)
MONOCYTES ABSOLUTE: 0.4 K/UL (ref 0–1.3)
MONOCYTES RELATIVE PERCENT: 7.5 %
NEUTROPHILS ABSOLUTE: 2.8 K/UL (ref 1.7–7.7)
NEUTROPHILS RELATIVE PERCENT: 60.6 %
PDW BLD-RTO: 13.7 % (ref 12.4–15.4)
PLATELET # BLD: 302 K/UL (ref 135–450)
PMV BLD AUTO: 7.4 FL (ref 5–10.5)
POTASSIUM SERPL-SCNC: 4.5 MMOL/L (ref 3.5–5.1)
RBC # BLD: 4.43 M/UL (ref 4–5.2)
RHEUMATOID FACTOR: <10 IU/ML
SEDIMENTATION RATE, ERYTHROCYTE: 27 MM/HR (ref 0–30)
SODIUM BLD-SCNC: 137 MMOL/L (ref 136–145)
TOTAL PROTEIN: 6.7 G/DL (ref 6.4–8.2)
TRIGL SERPL-MCNC: 190 MG/DL (ref 0–150)
TSH SERPL DL<=0.05 MIU/L-ACNC: 1.61 UIU/ML (ref 0.27–4.2)
VLDLC SERPL CALC-MCNC: 38 MG/DL
WBC # BLD: 4.7 K/UL (ref 4–11)

## 2022-06-09 ENCOUNTER — OFFICE VISIT (OUTPATIENT)
Dept: FAMILY MEDICINE CLINIC | Age: 57
End: 2022-06-09
Payer: COMMERCIAL

## 2022-06-09 VITALS
RESPIRATION RATE: 12 BRPM | DIASTOLIC BLOOD PRESSURE: 88 MMHG | SYSTOLIC BLOOD PRESSURE: 120 MMHG | BODY MASS INDEX: 32.1 KG/M2 | HEART RATE: 86 BPM | WEIGHT: 188 LBS | OXYGEN SATURATION: 98 % | HEIGHT: 64 IN

## 2022-06-09 DIAGNOSIS — Z00.00 ENCOUNTER FOR WELL ADULT EXAM WITHOUT ABNORMAL FINDINGS: Primary | ICD-10-CM

## 2022-06-09 DIAGNOSIS — F41.9 ANXIETY: ICD-10-CM

## 2022-06-09 DIAGNOSIS — M25.50 POLYARTHRALGIA: ICD-10-CM

## 2022-06-09 DIAGNOSIS — L40.9 PSORIASIS: ICD-10-CM

## 2022-06-09 DIAGNOSIS — E78.00 HYPERCHOLESTEREMIA: ICD-10-CM

## 2022-06-09 DIAGNOSIS — R79.82 ELEVATED C-REACTIVE PROTEIN (CRP): ICD-10-CM

## 2022-06-09 DIAGNOSIS — I10 HYPERTENSION, ESSENTIAL: ICD-10-CM

## 2022-06-09 DIAGNOSIS — R73.9 HYPERGLYCEMIA: ICD-10-CM

## 2022-06-09 DIAGNOSIS — E03.9 ACQUIRED UNDERACTIVE THYROID: ICD-10-CM

## 2022-06-09 PROBLEM — Z87.19 HISTORY OF COLONIC DIVERTICULITIS: Status: ACTIVE | Noted: 2020-11-18

## 2022-06-09 PROCEDURE — 99396 PREV VISIT EST AGE 40-64: CPT | Performed by: FAMILY MEDICINE

## 2022-06-09 RX ORDER — HYDROCHLOROTHIAZIDE 25 MG/1
25 TABLET ORAL EVERY MORNING
Qty: 90 TABLET | Refills: 1 | Status: SHIPPED | OUTPATIENT
Start: 2022-06-09 | End: 2022-09-26

## 2022-06-09 NOTE — PATIENT INSTRUCTIONS
INSTRUCTIONS  NEXT APPOINTMENT: Please schedule annual complete physical (30 minutes) in 6 months. · Dr. Garces and Sirera López (Nurse Practitioner) are sharing their practices as a team.  This will allow increased access to care. Office visits may alternate between these providers while we continue to maintain high quality of care. · PLEASE TAKE THIS FORM TO CHECK-OUT WINDOW TO SCHEDULE NEXT VISIT. · INCREASE HCTZ to 25 mg daily. Let us know if not working in a few weeks. May need to add losartan. · Get blood work done in about 3 weeks to check potassium and A1c. · See rheumatology for further evaluation.
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
## 2022-06-30 DIAGNOSIS — I10 HYPERTENSION, ESSENTIAL: ICD-10-CM

## 2022-06-30 DIAGNOSIS — R73.9 HYPERGLYCEMIA: ICD-10-CM

## 2022-06-30 LAB
ANION GAP SERPL CALCULATED.3IONS-SCNC: 14 MMOL/L (ref 3–16)
BUN BLDV-MCNC: 11 MG/DL (ref 7–20)
CALCIUM SERPL-MCNC: 9.1 MG/DL (ref 8.3–10.6)
CHLORIDE BLD-SCNC: 100 MMOL/L (ref 99–110)
CO2: 23 MMOL/L (ref 21–32)
CREAT SERPL-MCNC: 0.8 MG/DL (ref 0.6–1.1)
ESTIMATED AVERAGE GLUCOSE: 108.3 MG/DL
GFR AFRICAN AMERICAN: >60
GFR NON-AFRICAN AMERICAN: >60
GLUCOSE BLD-MCNC: 114 MG/DL (ref 70–99)
HBA1C MFR BLD: 5.4 %
POTASSIUM SERPL-SCNC: 4.4 MMOL/L (ref 3.5–5.1)
SODIUM BLD-SCNC: 137 MMOL/L (ref 136–145)

## 2022-08-18 DIAGNOSIS — E03.9 ACQUIRED UNDERACTIVE THYROID: ICD-10-CM

## 2022-08-18 DIAGNOSIS — F41.9 ANXIETY: ICD-10-CM

## 2022-08-18 RX ORDER — LIOTHYRONINE SODIUM 5 UG/1
TABLET ORAL
Qty: 180 TABLET | Refills: 3 | Status: SHIPPED | OUTPATIENT
Start: 2022-08-18

## 2022-08-18 RX ORDER — BUPROPION HYDROCHLORIDE 150 MG/1
TABLET ORAL
Qty: 90 TABLET | Refills: 1 | Status: SHIPPED | OUTPATIENT
Start: 2022-08-18

## 2022-08-24 ENCOUNTER — TELEPHONE (OUTPATIENT)
Dept: ORTHOPEDIC SURGERY | Age: 57
End: 2022-08-24

## 2022-08-24 NOTE — TELEPHONE ENCOUNTER
Please call to schedule her surg, she left vmail. May want to ask Juli Wooten since pt said it has been since last Jan, I think a year ago.    [phone]

## 2022-08-25 ENCOUNTER — PATIENT MESSAGE (OUTPATIENT)
Dept: FAMILY MEDICINE CLINIC | Age: 57
End: 2022-08-25

## 2022-08-25 RX ORDER — LOSARTAN POTASSIUM 50 MG/1
50 TABLET ORAL DAILY
Qty: 30 TABLET | Refills: 2 | Status: SHIPPED | OUTPATIENT
Start: 2022-08-25

## 2022-08-26 ENCOUNTER — TELEPHONE (OUTPATIENT)
Dept: ORTHOPEDIC SURGERY | Age: 57
End: 2022-08-26

## 2022-08-26 NOTE — TELEPHONE ENCOUNTER
Surgery and/or Procedure Scheduling     Contact Name: Lian Moreau  Surgical/Procedure Request: L hand   Patient Contact Number: [phone]    Patient called in to schedule for her sx. .    Please call patient back at the above number. ..

## 2022-09-22 ENCOUNTER — TELEPHONE (OUTPATIENT)
Dept: ORTHOPEDIC SURGERY | Age: 57
End: 2022-09-22

## 2022-09-22 NOTE — TELEPHONE ENCOUNTER
CPT: 03975  BODY PART: left hand  STATUS: outpatient  LOCATION: [place]  AUTHORIZATION: [employer]    Per [employer], [address] [place] [address]

## 2022-09-25 DIAGNOSIS — I10 HYPERTENSION, ESSENTIAL: ICD-10-CM

## 2022-09-26 ENCOUNTER — OFFICE VISIT (OUTPATIENT)
Dept: FAMILY MEDICINE CLINIC | Age: 57
End: 2022-09-26

## 2022-09-26 VITALS
RESPIRATION RATE: 14 BRPM | TEMPERATURE: 98.2 F | HEART RATE: 96 BPM | HEIGHT: 64 IN | SYSTOLIC BLOOD PRESSURE: 128 MMHG | BODY MASS INDEX: 32.5 KG/M2 | DIASTOLIC BLOOD PRESSURE: 82 MMHG | OXYGEN SATURATION: 97 % | WEIGHT: 190.4 LBS

## 2022-09-26 DIAGNOSIS — J45.20 MILD INTERMITTENT REACTIVE AIRWAY DISEASE WITHOUT COMPLICATION: ICD-10-CM

## 2022-09-26 DIAGNOSIS — I10 HYPERTENSION, ESSENTIAL: ICD-10-CM

## 2022-09-26 DIAGNOSIS — Z01.810 PREOP CARDIOVASCULAR EXAM: Primary | ICD-10-CM

## 2022-09-26 DIAGNOSIS — M65.4 DE QUERVAIN'S TENOSYNOVITIS, LEFT: ICD-10-CM

## 2022-09-26 PROCEDURE — PREOPEXAM PRE-OP EXAM: Performed by: FAMILY MEDICINE

## 2022-09-26 RX ORDER — HYDROCHLOROTHIAZIDE 25 MG/1
TABLET ORAL
Qty: 90 TABLET | Refills: 0 | Status: SHIPPED | OUTPATIENT
Start: 2022-09-26

## 2022-09-26 SDOH — ECONOMIC STABILITY: FOOD INSECURITY: WITHIN THE PAST 12 MONTHS, THE FOOD YOU BOUGHT JUST DIDN'T LAST AND YOU DIDN'T HAVE MONEY TO GET MORE.: NEVER TRUE

## 2022-09-26 SDOH — ECONOMIC STABILITY: FOOD INSECURITY: WITHIN THE PAST 12 MONTHS, YOU WORRIED THAT YOUR FOOD WOULD RUN OUT BEFORE YOU GOT MONEY TO BUY MORE.: NEVER TRUE

## 2022-09-26 ASSESSMENT — SOCIAL DETERMINANTS OF HEALTH (SDOH): HOW HARD IS IT FOR YOU TO PAY FOR THE VERY BASICS LIKE FOOD, HOUSING, MEDICAL CARE, AND HEATING?: NOT HARD AT ALL

## 2022-09-26 NOTE — PATIENT INSTRUCTIONS
INSTRUCTIONS  AM of surgery take the following with a sip of water: hydrochlorothiazide. Take losartan bight before per routine. Please schedule check-up in 4 months  Please get flu vaccine when available in fall. Can get either at this office or at stores such as [employer] and [address]. In October get COVID booster, new one if available OR the old one.

## 2022-09-26 NOTE — PROGRESS NOTES
PRE-OP HISTORY AND PHYSICAL   Subjective:    Nilsa Mack is a 62 y.o. female who presents to the office today for a preoperative consultation. Chief Complaint   Patient presents with    Pre-op Exam     Lt hand surgery. 11/13/2022. Dr Perla Vlilegas md   For de Quervain tenosynovitis, failed PT, injection    This consultation is requested for the specific conditions prompting preoperative evaluation (i.e. because of potential affect on operative risk):   1. Preop cardiovascular exam    2. De Quervain's tenosynovitis, left    3. Hypertension, essential    4. Mild intermittent reactive airway disease without complication - not needed rescue in couple years     Planned anesthesia is Local and IV sedation. The patient has the following known anesthesia issues: none. Bleeding risk: no recent abnormal bleeding, no remote history of abnormal bleeding. Review of Systems   General ROS: fever? No,    Night sweats? No  Hematological and Lymphatic ROS: easy bruising? No  Respiratory ROS: cough? No   Wheezing? No  Cardiovascular ROS: chest pain? No   Shortness of breath?  No    Patient Active Problem List   Diagnosis    Hypercholesteremia due to HDL > 100    Low testosterone level in female    Elevated C-reactive protein (CRP)    Primary osteoarthritis of first carpometacarpal joint of left hand    Family history of Alzheimer's disease    Psoriasis    Acquired underactive thyroid    Reactive airway disease    Chronic allergic rhinitis    Hyperglycemia    Eustachian catarrh, bilateral    Allergic sinusitis    Neuritis    Vasomotor rhinitis    Diverticulitis    Trigger middle finger of right hand    History of colonic diverticulitis    Trigger finger, acquired    Anxiety    Hypertension, essential    De Quervain's tenosynovitis, left    Preop cardiovascular exam     Past Medical History:   Diagnosis Date    Abnormal Pap smear of cervix     Acquired underactive thyroid 4/20/2017    Asthma     when have colds goes into asthma tablet by mouth daily Yes Stacey Owens MD   liothyronine (CYTOMEL) 5 MCG tablet TAKE TWO TABLETS BY MOUTH EVERY DAY Yes Stacey Owens MD   buPROPion (WELLBUTRIN XL) 150 MG extended release tablet TAKE ONE TABLET BY MOUTH EVERY MORNING Yes Stacey Owens MD   progesterone (PROMETRIUM) 100 MG CAPS capsule TAKE ONE CAPSULE BY MOUTH NIGHTLY Yes Emerson Villegas MD   estradiol (ESTRACE) 1 mg tablet TAKE 1 TABLET BY MOUTH ONE TIME A DAY Yes Emerson Villegas MD   ipratropium (ATROVENT) 0.03 % nasal spray 2 sprays by Nasal route as needed (nasal drainage) Yes NATALYA Ann CNP   loratadine (CLARITIN) 10 MG capsule Take 10 mg by mouth daily Yes Historical Provider, MD   Multiple Vitamin (MULTIVITAMINS PO) Take 1 tablet by mouth daily  Yes Historical Provider, MD   Ascorbic Acid (VITAMIN C) 500 MG tablet Take 500 mg by mouth daily. Yes Historical Provider, MD   magnesium 30 MG tablet Take 400 mg by mouth daily  Yes Historical Provider, MD   zinc gluconate 50 MG tablet Take 50 mg by mouth daily. Yes Historical Provider, MD       HISTORY:  Patient's medications, allergies, past medical, surgical, social and family histories were reviewed and updated as appropriate (See above). Objective:   PHYSICAL EXAM  /82 (Site: Right Upper Arm, Position: Sitting, Cuff Size: Medium Adult)   Pulse 96   Temp 98.2 °F (36.8 °C) (Oral)   Resp 14   Ht 5' 4\" (1.626 m)   Wt 190 lb 6.4 oz (86.4 kg)   LMP 12/15/2015   SpO2 97%   BMI 32.68 kg/m²   BP Readings from Last 5 Encounters:   09/26/22 128/82   06/09/22 120/88   05/17/22 (!) 148/95   05/10/22 (!) 144/98   03/07/22 118/68     Wt Readings from Last 5 Encounters:   09/26/22 190 lb 6.4 oz (86.4 kg)   06/09/22 188 lb (85.3 kg)   05/17/22 201 lb 11.5 oz (91.5 kg)   03/07/22 189 lb 8 oz (86 kg)   01/28/22 186 lb (84.4 kg)      GENERAL:   well-developed, well-nourished, alert, no distress.      EYES:   External findings: lids and lashes normal and conjunctivae and sclerae normal  Eyes: no periorbital cellulitis. ENT:   External nose and ears appear normal  normal TM's and external ear canals both ears  Pharynx: normal. Exudates: None  Lips, mucosa, and tongue normal, teeth normal, and dentures in place   Hearing grossly Normal.     NECK:   No adenopathy, supple, symmetrical, trachea midline  Thyroid not enlarged, symmetric, no tenderness/mass/nodules  LYMPH:  no cervical nodes, no supraclavicular nodes  LUNGS:    Breathing unlabored  clear to auscultation bilaterally and good air movement  CARDIOVASC:   regular rate and rhythm, S1, S2 normal. No murmur, click, rub or gallop  Apical impulse normal  LEGS:  Lower extremity edema: none    No carotid bruits  ABDOMEN:   Soft, non-tender, no masses  No hepatosplenomegaly  No hernias noted. Exam limited by N/A  SKIN: warm and dry  No rashes or suspicious lesions  PSYCH:    Alert and oriented  Normal reasoning, insight good  Facial expressions full, mood appropriate  No memory disturbance noted    Lab Review   Lab Results   Component Value Date     06/30/2022    K 4.4 06/30/2022    CREATININE 0.8 06/30/2022     Lab Results   Component Value Date    WBC 4.7 06/01/2022    HGB 13.1 06/01/2022    HCT 38.3 06/01/2022    MCV 86.6 06/01/2022     06/01/2022     Lab Results   Component Value Date    LABA1C 5.4 06/30/2022      Assessment:    62 y.o. female with planned surgery as above. Diagnosis Orders   1. Preop cardiovascular exam        2. De Quervain's tenosynovitis, left        3. Hypertension, essential        4. Mild intermittent reactive airway disease without complication            Plan:   Ok to proceed with the surgery. Low cardiac risk. Pt. advised to stop all Rx except anti-hypertensives the a.m. of surgery. Patient advised to hold blood thinning medication 7 days prior to procedure. Mark Uriostegui MD      INSTRUCTIONS  AM of surgery take the following with a sip of water: hydrochlorothiazide.  Take losartan bight before per routine. Please schedule check-up in 4 months  Please get flu vaccine when available in fall. Can get either at this office or at stores such as [employer] and [employer]. In October get COVID booster, new one if available OR the old one.

## 2022-10-06 NOTE — FLOWSHEET NOTE
Preoperative Screening for Elective Surgery/Invasive Procedures While COVID-19 present in the community     Have you tested positive or have been told to self-isolate for COVID-19 like symptoms within the past 28 days? Do you currently have any of the following symptoms? Fever >100.0 F or 99.9 F in immunocompromised patients? New onset cough, shortness of breath or difficulty breathing? New onset sore throat, myalgia (muscle aches and pains), headache, loss of taste/smell or diarrhea? Have you had a potential exposure to COVID-19 within the past 14 days by:  Close contact with a confirmed case? Close contact with a healthcare worker,  or essential infrastructure worker (grocery store, [employer], gas station, public utilities or transportation)? Do you reside in a congregate setting such as; skilled nursing facility, adult home, correctional facility, homeless shelter or other institutional setting? Have you had recent travel to a known COVID-19 hotspot? No to all  above     * Admitted with diarrhea? [] YES    [x]  NO     *Prior history of C-Diff. In last 3 months? [] YES    [x]  NO     *Antibiotic use in the past 6-8 weeks? [x]  NO    []  YES      If yes, which: REASON_________________     *Prior hospitalization or nursing home in the last month? []  YES    [x]  NO     [facility]. .call before you fall    [address] [facility] time___10/13/22 0630_________        Surgery time______0755______    Do not eat or drink anything after 12:00 midnight prior to your surgery. This includes water chewing gum, mints and ice chips- the Day of Surgery. You may brush your teeth and gargle the morning of your surgery, but do not swallow the water     Please see your family doctor/pediatrician for a history and physical and/or questions concerning medications. Bring any test results/reports from your physicians office.    If you are under the care of a heart doctor or specialist doctor, please be aware that you may be asked to them for clearance    You may be asked to stop blood thinners such as Coumadin, Plavix, Fragmin, Lovenox, etc., or any anti-inflammatories such as:  Aspirin, Ibuprofen, Advil, Naproxen prior to your surgery. We also ask that you stop any OTC medications such as fish oil, vitamin E, glucosamine, garlic, Multivitamins, COQ 10, etc.    We ask that you do not smoke 24 hours prior to surgery  We ask that you do not  drink any alcoholic beverages 24 hours prior to surgery     You must make arrangements for a responsible adult to take you home after your surgery. For your safety you will not be allowed to leave alone or drive yourself home. Your surgery will be cancelled if you do not have a ride home. Also for your safety, it is strongly suggested that someone stay with you the first 24 hours after your surgery. A parent or legal guardian must accompany a child scheduled for surgery and plan to stay at the hospital until the child is discharged. Please do not bring other children with you. For your comfort, please wear simple loose fitting clothing to the hospital.  Please do not bring valuables. Do not wear any make-up or nail polish on your fingers or toes. For your safety, please do not wear any jewelry or body piercing's on the day of surgery. All jewelry must be removed. If you have dentures, they will be removed before going to operating room. For your convenience, we will provide you with a container. If you wear contact lenses or glasses, they will be removed, please bring a case for them. If you have a living will and a durable power of  for healthcare, please bring in a copy.      As part of our patient safety program to minimize surgical site infections, we ask you to do the following:    Please notify your surgeon if you develop any illness between         now and the day of your surgery. This includes a cough, cold, fever, sore throat, nausea,         or vomiting, and diarrhea, etc.   Please notify your surgeon if you experience dizziness, shortness         of breath or blurred vision between now and the time of your surgery. Do not shave your operative site 96 hours prior to surgery. For face and neck surgery, men may use an electric razor 48 hours   prior to surgery. You may shower the night before surgery or the morning of   your surgery with an antibacterial soap. You will need to bring a photo ID and insurance card     If you use a C-pap or Bi-pap machine, please bring your machine with you to the hospital     Our goal is to provide you with excellent care, therefore, visitors will be limited to so that we may focus on providing this care for you. Please contact your surgeon office, if you have any further questions. [facility] phone number:  [phone] [facility] fax number:  [phone]    Please note these are generalized instructions for all surgical cases, you may be provided with more specific instructions according to your surgery.

## 2022-10-11 ENCOUNTER — OFFICE VISIT (OUTPATIENT)
Dept: RHEUMATOLOGY | Age: 57
End: 2022-10-11
Payer: COMMERCIAL

## 2022-10-11 VITALS
WEIGHT: 189 LBS | SYSTOLIC BLOOD PRESSURE: 132 MMHG | HEIGHT: 64 IN | BODY MASS INDEX: 32.27 KG/M2 | DIASTOLIC BLOOD PRESSURE: 86 MMHG

## 2022-10-11 DIAGNOSIS — M15.9 PRIMARY OSTEOARTHRITIS INVOLVING MULTIPLE JOINTS: ICD-10-CM

## 2022-10-11 DIAGNOSIS — L40.50 PSORIATIC ARTHRITIS (HCC): Primary | ICD-10-CM

## 2022-10-11 DIAGNOSIS — L40.9 PSORIASIS: ICD-10-CM

## 2022-10-11 PROCEDURE — 99204 OFFICE O/P NEW MOD 45 MIN: CPT | Performed by: INTERNAL MEDICINE

## 2022-10-11 RX ORDER — PREDNISONE 1 MG/1
TABLET ORAL
Qty: 60 TABLET | Refills: 1 | Status: SHIPPED | OUTPATIENT
Start: 2022-10-11

## 2022-10-11 NOTE — PROGRESS NOTES
10/11/2022  Patient Name: Coni Davis  : 1965  Medical Record: [id number]      ASSESSMENT AND PLAN    Assessment/Plan:      ASSESSMENT:    1. Psoriatic arthritis ([facility] [address]    2. Primary osteoarthritis involving multiple joints    3. Psoriasis        PLAN:     1. Psoriatic arthritis (HCC)  Peripheral arthritis, plantar fasciitis, psoriasis, nail changes, inflammatory back pain [SI joint pain]  Mild active synovitis on joint exam  Most likely psoriatic arthritis with a component of osteoarthritis contributing to the joint symptoms  RF, CCP negative, ESR normal, CRP 18.2  I will check SI joint x-rays, hand and foot x-rays, HLA-B27, baseline hepatitis panel, CBC and CMP for further evaluation. I will start prednisone taper 15 mg daily for 10 days, 10 mg daily for 10 days and then 5 mg daily thereafter  DMARD therapy after reviewing blood work  -     CBC with Auto Differential; Future  -     Comprehensive Metabolic Panel; Future  -     C-Reactive Protein; Future  -     Sedimentation Rate; Future  -     Hepatitis B Core Antibody, IgM; Future  -     Hepatitis B Surface Antigen; Future  -     Hepatitis C Antibody; Future  -     XR HAND RIGHT (MIN 3 VIEWS); Future  -     XR HAND LEFT (MIN 3 VIEWS); Future  -     XR FOOT RIGHT (MIN 3 VIEWS); Future  -     XR FOOT LEFT (MIN 3 VIEWS); Future  -     HLA-B27 Antigen; Future  -     Miscellaneous Sendout; Future  -     XR SacroilIAC Joints PA and Oblique; Future  -     predniSONE (DELTASONE) 5 MG tablet; Take 3 tabs for 10 days, 2 tabs daily for 10 days and 1 tab daily, Disp-60 tablet, R-1Normal    2. Primary osteoarthritis involving multiple joints    3. Psoriasis  Continue topical clobetasol as needed and continue follow-up with a dermatologist  The patient indicates understanding of these issues and agrees with the plan. Return in about 6 weeks (around 2022).       The risks and benefits of my recommendations, as well as other treatment options, benefits and side effects werediscussed with the patient. All questions were answered. I reviewed patient's history, referral documents and electronic medical records  Copy of consult note is being routedelectronically/faxed to referring physician         MEDICATIONS  Current Outpatient Medications   Medication Sig Dispense Refill    predniSONE (DELTASONE) 5 MG tablet Take 3 tabs for 10 days, 2 tabs daily for 10 days and 1 tab daily 60 tablet 1    hydroCHLOROthiazide (HYDRODIURIL) 25 MG tablet TAKE ONE TABLET BY MOUTH EVERY MORNING 90 tablet 0    losartan (COZAAR) 50 MG tablet Take 1 tablet by mouth daily 30 tablet 2    liothyronine (CYTOMEL) 5 MCG tablet TAKE TWO TABLETS BY MOUTH EVERY  tablet 3    buPROPion (WELLBUTRIN XL) 150 MG extended release tablet TAKE ONE TABLET BY MOUTH EVERY MORNING 90 tablet 1    progesterone (PROMETRIUM) 100 MG CAPS capsule TAKE ONE CAPSULE BY MOUTH NIGHTLY 90 capsule 3    estradiol (ESTRACE) 1 mg tablet TAKE 1 TABLET BY MOUTH ONE TIME A DAY 90 tablet 3    ipratropium (ATROVENT) 0.03 % nasal spray 2 sprays by Nasal route as needed (nasal drainage) 30 mL 5    loratadine (CLARITIN) 10 MG capsule Take 10 mg by mouth as needed      Multiple Vitamin (MULTIVITAMINS PO) Take 1 tablet by mouth daily       Ascorbic Acid (VITAMIN C) 500 MG tablet Take 500 mg by mouth daily. magnesium 30 MG tablet Take 400 mg by mouth daily       zinc gluconate 50 MG tablet Take 50 mg by mouth daily. No current facility-administered medications for this visit. ALLERGIES  Allergies   Allergen Reactions    Penicillins Hives    Other Itching     thermersol-preservative for contact solution-pt states got itchy eyes    Nickel Rash         Comments  No specialty comments available.     Joel Madera MD    HISTORY OF PRESENT ILLNESS  Fanta Boo Berny is a 62 y.o. female with past medical history of anxiety, psoriasis, hypertension, hypothyroidism who is being seen for follow up evaluation of joint pain.  Joint symptoms started 2 years ago and have progressively gotten worse over the past 6 months. Joint pain travels around. She has pain in her neck, elbows, hands, lower back, feet. She has pain on daily basis which waxes and wanes in severity. She rates her pain as 1 out of 10 which can go up to 8 out of 10. Symptoms are worse in the right SI joint. She denies any swelling in the joints. She has stiffness in the morning lasting for up to 2 hours. She also has a history of plantar fasciitis, trigger finger and left de Quervain tenosynovitis. She uses over-the-counter Aleve sporadically which helps. She has right SI joint pain with stiffness in the morning lasting for up to 2 hours. She denies any radiation, tingling or numbness, weakness, bowel or bladder dysfunction or saddle anesthesia. Pain gets worse with physical exertion. She has a history of psoriasis which was diagnosed several years ago. She has rash on her scalp, extensor surface of elbows and knees. She sees a dermatologist.  She uses a topical clobetasol which helps. Her sister and dad has psoriasis. She denies family history of rheumatoid arthritis. She denies fever, weight loss or swollen glands. She denies dactylitis, inflammatory bowel disease, uveitis. She had a blood work by PCP that showed negative RF, CCP, normal ESR and CRP of 18.2. HPI  Review of Systems    REVIEW OF SYSTEMS: Positive for dry eyes, dry mouth, rash, anxiety  Constitutional: No unanticipated weight loss or fevers. Integumentary: No photosensitivity, malar rash, livedo reticularis, alopecia and Raynaud's symptoms, sclerodactyly, skin tightening  Eyes: negative for visual disturbance and persistent redness, discharge from eyes   ENT: - No tinnitus, loss of hearing, vertigo, or recurrent ear infections.  - No history of nasal/oral ulcers.   Cardiovascular: No history of pericarditis, chest pain or murmur or palpitations  Respiratory: No shortness of breath, cough or history of interstitial lung disease. No history of pleurisy. No history of tuberculosis or atypical infections. Gastrointestinal: No history of heart burn, dysphagia or esophageal dysmotility. No change in bowel habits or any inflammatory bowel disease. Genitourinary: No history of renal disease, miscarriages. Hematologic/Lymphatic: No abnormal bruising or bleeding, blood clots or swollen lymph nodes. Neurological: No history of headaches, seizure or focal weakness. No history of neuropathies, paresthesias or hyperesthesias, facial droop, diplopia  Psychiatric: No history of bipolar disease, depression  Endocrine: Denies any polyuria, polydipsia and osteoporosis  Allergic/Immunologic: No nasal congestion or hives. I have reviewed patients Past medical History, Social History and Family History as mentioned in her chart and this remains unchanged fromprevious.     Past Medical History:   Diagnosis Date    Abnormal Pap smear of cervix     Acquired underactive thyroid 04/20/2017    Asthma     when have colds goes into asthma    Chronic allergic rhinitis 04/03/2018    Diverticulitis 2006    colon 2006    Family history of Alzheimer's disease 11/15/2016    Fibrocystic breast     Hypercholesteremia due to HDL > 100 10/17/2013    no meds    Irregular uterine bleeding     Left hand pain 10/2022    Menopause ovarian failure     Primary osteoarthritis of first carpometacarpal joint of left hand 09/25/2015     Past Surgical History:   Procedure Laterality Date    BREAST BIOPSY      COLONOSCOPY      x2    COLPOSCOPY      Dr. Aurea Lentz liposuction-abdomen and thighs    EYE SURGERY      corrective    FINGER TRIGGER RELEASE Left 5/19/16    Middle Finger    FINGER TRIGGER RELEASE Right 1/20/2022    RIGHT INDEX FINGER, MIDDLE FINGER AND RING FINGER TRIGGER FINGER RELEASE performed by Washington Dill MD at [facility] Left 5/8/14    Middle Finger Ulnar Digital Nerve contusion and neuropraxia    SMALL INTESTINE SURGERY N/A 11/18/2020    SIGMOID COLECTOMY performed by Priyank Paez MD at [facility] History     Socioeconomic History    Marital status: Single     Spouse name: Not on file    Number of children: 0    Years of education: Not on file    Highest education level: Not on file   Occupational History    Occupation: Physical Therapist     Employer: [address] [place] [address]: [employer]   Tobacco Use    Smoking status: Never    Smokeless tobacco: Never    Tobacco comments:     congrats   Vaping Use    Vaping Use: Never used   Substance and Sexual Activity    Alcohol use: Yes     Alcohol/week: 5.0 standard drinks     Types: 5 Standard drinks or equivalent per week     Comment: socially    Drug use: Never    Sexual activity: Yes     Partners: Male     Birth control/protection: Condom   Other Topics Concern    Not on file   Social History Narrative    Self-breast exams: yes. Exercise: walking and cardiovascular equipment three times a week.   Seatbelt use: Always    Physical therapist     Social Determinants of Health     Financial Resource Strain: Low Risk     Difficulty of Paying Living Expenses: Not hard at all   Food Insecurity: No Food Insecurity    Worried About Running Out of Food in the Last Year: Never true    Ran Out of Food in the Last Year: Never true   Transportation Needs: Not on file   Physical Activity: Not on file   Stress: Not on file   Social Connections: Not on file   Intimate Partner Violence: Not on file   Housing Stability: Not on file     Family History   Problem Relation Age of Onset    Cancer Mother         uterine sarcoma, skin non-melanoma    Stroke Mother     Alzheimer's Disease Mother     Cancer Father         skin non-melanoma    Alzheimer's Disease Father          PHYSICAL EXAM   Vitals:    10/11/22 0906   BP: 132/86   Site: Left Upper Arm   Position: Sitting   Cuff Size: Large Adult   Weight: 189 lb (85.7 kg)   Height: 5' 4\" (1.626 m)     Physical Exam  Constitutional:  Well developed, well nourished, no acute distress, non-toxic appearance   Musculoskeletal:    RIGHT  Swell  Tender  ROM  LEFT  Swell  Tender  ROM    DIP2  0  0  Heberden  0  0  Heberden   DIP3  0  0  Heberden  0  0  Heberden   DIP4  0  0  Heberden  0  0  Heberden   DIP5  0  0  Heberden  0  0  Heberden   PIP1  0  0  Bony change  0  0  Bony change   PIP2  + + FULL   + + FULL    PIP3  +  +  FULL   0  0  FULL    PIP4  +  +  FULL   +  +  FULL    PIP5  +  +  FULL   +  +  FULL    MCP1  0  0  Bony change  0  0  Bony change   MCP2  0  0  Bony change  0  0  Bony change   MCP3  0  0  Bony change  0  0  Bony change   MCP4  0  0  FULL   0  0  FULL    MCP5  0  0  FULL   0  0  FULL    Wrist  0  0  FULL   + + FULL    Elbow  0  0  FULL   0  0  FULL    Shouldr  0  0  FULL   0  0  FULL    Hip  0  0  FULL   0  0  FULL    Knee  0  0  Crepitus  0  0  Crepitus   Ankle  0  0  FULL   0  0  FULL    MTP1  0  0  Hallux valgus/bunion  0  0  Hallux valgus/bunion   MTP2  0  0  FULL   0  0  FULL    MTP3  0  0  FULL   0  0  FULL    MTP4  0  0  FULL   0  0  FULL    MTP5  0  0  FULL   0  0  FULL    IP1  0  0  FULL   0  0  FULL    IP2  0  0  FULL   0  0  FULL    IP3  0  0  FULL   0  0  FULL    IP4  0  0  FULL   0  0  FULL    IP5  0  0  FULL   0 0 FULL      Squaring, bony enlargement tenderness of left CMC joint  Bilateral plantar fasciitis  Ambulates without assistance, normal gait  Neck: Full ROM, no tenderness,supple   Back-right SI joint tenderness  Eyes:  PERRL, extra ocular movements intact, conjunctiva normal   HEENT:  Atraumatic, normocephalic, external ears normal, oropharynx moist, no pharyngeal exudates. Respiratory:  No respiratory distress  GI:  Soft, nondistended, normal bowel sounds, nontender, noorganomegaly, no mass, no rebound, no guarding   :  No costovertebral angle tenderness   Integument:  Well hydrated, no telangiectasias.   Mild scaly rash on extensor surface of elbows, knees and scalp, nail ridges with mild nail pitting  Lymphatic:  No lymphadenopathy noted   Neurologic:   Alert & oriented x 3, CN 2-12 normal, no focal deficits noted. Sensations Intact. Muscle strength 5/5 proximally and distally in upper and lower extremities.    Psychiatric:  Speech and behavior appropriate           LABS AND IMAGING  Outside data reviewed and in HPI    Lab Results   Component Value Date/Time    WBC 4.7 06/01/2022 07:18 AM    RBC 4.43 06/01/2022 07:18 AM    HGB 13.1 06/01/2022 07:18 AM    HCT 38.3 06/01/2022 07:18 AM     06/01/2022 07:18 AM    MCV 86.6 06/01/2022 07:18 AM    MCH 29.6 06/01/2022 07:18 AM    MCHC 34.2 06/01/2022 07:18 AM    RDW 13.7 06/01/2022 07:18 AM    SEGSPCT 59.5 03/22/2012 06:21 PM    LYMPHOPCT 28.9 06/01/2022 07:18 AM    MONOPCT 7.5 06/01/2022 07:18 AM    EOSPCT 0.6 03/22/2012 06:21 PM    BASOPCT 1.0 06/01/2022 07:18 AM    MONOSABS 0.4 06/01/2022 07:18 AM    LYMPHSABS 1.3 06/01/2022 07:18 AM    EOSABS 0.1 06/01/2022 07:18 AM    BASOSABS 0.0 06/01/2022 07:18 AM    DIFFTYPE Scan-K 03/22/2012 06:21 PM       Chemistry        Component Value Date/Time     06/30/2022 0733    K 4.4 06/30/2022 0733    K 4.0 05/17/2022 0749     06/30/2022 0733    CO2 23 06/30/2022 0733    BUN 11 06/30/2022 0733    CREATININE 0.8 06/30/2022 0733        Component Value Date/Time    CALCIUM 9.1 06/30/2022 0733    ALKPHOS 98 06/01/2022 0718    AST 23 06/01/2022 0718    ALT 11 06/01/2022 0718    BILITOT <0.2 06/01/2022 0718          Lab Results   Component Value Date    SEDRATE 27 06/01/2022     Lab Results   Component Value Date    CRP 18.2 (H) 06/01/2022     Lab Results   Component Value Date/Time    LEOLA Negative 06/01/2022 07:18 AM     Lab Results   Component Value Date/Time    RF <10.0 06/01/2022 07:18 AM     Lab Results   Component Value Date/Time    LEOLA Negative 06/01/2022 07:18 AM     No results found for: DSDNAG, DSDNAIGGIFA  No results found for: SSAROAB, SSALAAB  No results found for: SMAB, RNPAB  No results found for: CENTABIGG  No results found for: C3, C4, ACE  Lab Results   Component Value Date/Time    VITD25 58.4 05/05/2017 12:55 PM     No results found for: Jamie Antonio  No results found for: Cesario Page  Lab Results   Component Value Date    TSH 1.61 06/01/2022     Lab Results   Component Value Date    VITD25 58.4 05/05/2017       Radiology:        ######################################################################    I thank you for giving me theopportunity to participate in [facility]. If you have any questions or concerns please feel free to contact me. I look forward to following  Fanta along with you. Electronically signed by: Scooter Ibrahim MD, MD, 10/11/2022 9:45 AM    Documentation was done using voice recognition dragon software. Every effort was made to ensure accuracy;however, inadvertent unintentional computerized transcription errors may be present.

## 2022-10-11 NOTE — LETTER
[facility] Rheumatology  [address]  [place] [address] [address] [address]  Phone: [phone]  Fax: [phone]    Eugene De La Cruz MD    October 11, 2022     Shannon Fiore MD  [address] [place] [address]    Patient: Harley Mart   MR Number: [id number]   YOB: 1965   Date of Visit: 10/11/2022       Dear Shannon Fiore:    Thank you for referring Harley Shoulder to me for evaluation/treatment. Below are the relevant portions of my assessment and plan of care. If you have questions, please do not hesitate to call me. I look forward to following Fanta along with you.     Sincerely,      Eugene De La Cruz MD

## 2022-10-12 ENCOUNTER — ANESTHESIA EVENT (OUTPATIENT)
Dept: OPERATING ROOM | Age: 57
End: 2022-10-12
Payer: COMMERCIAL

## 2022-10-13 ENCOUNTER — ANESTHESIA (OUTPATIENT)
Dept: OPERATING ROOM | Age: 57
End: 2022-10-13
Payer: COMMERCIAL

## 2022-10-13 ENCOUNTER — HOSPITAL ENCOUNTER (OUTPATIENT)
Age: 57
Setting detail: OUTPATIENT SURGERY
Discharge: HOME | End: 2022-10-13
Attending: ORTHOPAEDIC SURGERY | Admitting: ORTHOPAEDIC SURGERY
Payer: COMMERCIAL

## 2022-10-13 VITALS
BODY MASS INDEX: 32.1 KG/M2 | HEIGHT: 64 IN | HEART RATE: 70 BPM | TEMPERATURE: 97.8 F | RESPIRATION RATE: 16 BRPM | OXYGEN SATURATION: 96 % | SYSTOLIC BLOOD PRESSURE: 132 MMHG | WEIGHT: 188 LBS | DIASTOLIC BLOOD PRESSURE: 87 MMHG

## 2022-10-13 PROCEDURE — 2709999900 HC NON-CHARGEABLE SUPPLY: Performed by: ORTHOPAEDIC SURGERY

## 2022-10-13 PROCEDURE — 6360000002 HC RX W HCPCS

## 2022-10-13 PROCEDURE — 2500000003 HC RX 250 WO HCPCS: Performed by: ORTHOPAEDIC SURGERY

## 2022-10-13 PROCEDURE — 3700000001 HC ADD 15 MINUTES (ANESTHESIA): Performed by: ORTHOPAEDIC SURGERY

## 2022-10-13 PROCEDURE — 7100000000 HC PACU RECOVERY - FIRST 15 MIN: Performed by: ORTHOPAEDIC SURGERY

## 2022-10-13 PROCEDURE — 3600000005 HC SURGERY LEVEL 5 BASE: Performed by: ORTHOPAEDIC SURGERY

## 2022-10-13 PROCEDURE — 3600000015 HC SURGERY LEVEL 5 ADDTL 15MIN: Performed by: ORTHOPAEDIC SURGERY

## 2022-10-13 PROCEDURE — 7100000001 HC PACU RECOVERY - ADDTL 15 MIN: Performed by: ORTHOPAEDIC SURGERY

## 2022-10-13 PROCEDURE — 7100000010 HC PHASE II RECOVERY - FIRST 15 MIN: Performed by: ORTHOPAEDIC SURGERY

## 2022-10-13 PROCEDURE — 2500000003 HC RX 250 WO HCPCS

## 2022-10-13 PROCEDURE — 7100000011 HC PHASE II RECOVERY - ADDTL 15 MIN: Performed by: ORTHOPAEDIC SURGERY

## 2022-10-13 PROCEDURE — 2580000003 HC RX 258: Performed by: ANESTHESIOLOGY

## 2022-10-13 PROCEDURE — 25000 INCISION OF TENDON SHEATH: CPT | Performed by: ORTHOPAEDIC SURGERY

## 2022-10-13 PROCEDURE — 3700000000 HC ANESTHESIA ATTENDED CARE: Performed by: ORTHOPAEDIC SURGERY

## 2022-10-13 RX ORDER — FENTANYL CITRATE 50 UG/ML
INJECTION, SOLUTION INTRAMUSCULAR; INTRAVENOUS PRN
Status: DISCONTINUED | OUTPATIENT
Start: 2022-10-13 | End: 2022-10-13 | Stop reason: SDUPTHER

## 2022-10-13 RX ORDER — SODIUM CHLORIDE 0.9 % (FLUSH) 0.9 %
5-40 SYRINGE (ML) INJECTION PRN
Status: DISCONTINUED | OUTPATIENT
Start: 2022-10-13 | End: 2022-10-13 | Stop reason: HOSPADM

## 2022-10-13 RX ORDER — PROPOFOL 10 MG/ML
INJECTION, EMULSION INTRAVENOUS CONTINUOUS PRN
Status: DISCONTINUED | OUTPATIENT
Start: 2022-10-13 | End: 2022-10-13 | Stop reason: SDUPTHER

## 2022-10-13 RX ORDER — ONDANSETRON 2 MG/ML
4 INJECTION INTRAMUSCULAR; INTRAVENOUS
Status: DISCONTINUED | OUTPATIENT
Start: 2022-10-13 | End: 2022-10-13 | Stop reason: HOSPADM

## 2022-10-13 RX ORDER — SODIUM CHLORIDE 9 MG/ML
INJECTION, SOLUTION INTRAVENOUS CONTINUOUS
Status: DISCONTINUED | OUTPATIENT
Start: 2022-10-13 | End: 2022-10-13 | Stop reason: HOSPADM

## 2022-10-13 RX ORDER — SODIUM CHLORIDE 0.9 % (FLUSH) 0.9 %
5-40 SYRINGE (ML) INJECTION EVERY 12 HOURS SCHEDULED
Status: DISCONTINUED | OUTPATIENT
Start: 2022-10-13 | End: 2022-10-13 | Stop reason: HOSPADM

## 2022-10-13 RX ORDER — OXYCODONE HYDROCHLORIDE 5 MG/1
5 TABLET ORAL
Status: DISCONTINUED | OUTPATIENT
Start: 2022-10-13 | End: 2022-10-13 | Stop reason: HOSPADM

## 2022-10-13 RX ORDER — SODIUM CHLORIDE 9 MG/ML
INJECTION, SOLUTION INTRAVENOUS PRN
Status: DISCONTINUED | OUTPATIENT
Start: 2022-10-13 | End: 2022-10-13 | Stop reason: HOSPADM

## 2022-10-13 RX ORDER — FENTANYL CITRATE 50 UG/ML
25 INJECTION, SOLUTION INTRAMUSCULAR; INTRAVENOUS EVERY 5 MIN PRN
Status: DISCONTINUED | OUTPATIENT
Start: 2022-10-13 | End: 2022-10-13 | Stop reason: HOSPADM

## 2022-10-13 RX ORDER — LIDOCAINE HYDROCHLORIDE 20 MG/ML
INJECTION, SOLUTION EPIDURAL; INFILTRATION; INTRACAUDAL; PERINEURAL PRN
Status: DISCONTINUED | OUTPATIENT
Start: 2022-10-13 | End: 2022-10-13 | Stop reason: SDUPTHER

## 2022-10-13 RX ORDER — PROPOFOL 10 MG/ML
INJECTION, EMULSION INTRAVENOUS PRN
Status: DISCONTINUED | OUTPATIENT
Start: 2022-10-13 | End: 2022-10-13 | Stop reason: SDUPTHER

## 2022-10-13 RX ORDER — MIDAZOLAM HYDROCHLORIDE 1 MG/ML
INJECTION INTRAMUSCULAR; INTRAVENOUS PRN
Status: DISCONTINUED | OUTPATIENT
Start: 2022-10-13 | End: 2022-10-13 | Stop reason: SDUPTHER

## 2022-10-13 RX ADMIN — MIDAZOLAM 1 MG: 1 INJECTION INTRAMUSCULAR; INTRAVENOUS at 07:55

## 2022-10-13 RX ADMIN — PROPOFOL 180 MCG/KG/MIN: 10 INJECTION, EMULSION INTRAVENOUS at 07:58

## 2022-10-13 RX ADMIN — PROPOFOL 100 MG: 10 INJECTION, EMULSION INTRAVENOUS at 07:56

## 2022-10-13 RX ADMIN — FENTANYL CITRATE 25 MCG: 50 INJECTION INTRAMUSCULAR; INTRAVENOUS at 07:57

## 2022-10-13 RX ADMIN — LIDOCAINE HYDROCHLORIDE 80 MG: 20 INJECTION, SOLUTION EPIDURAL; INFILTRATION; INTRACAUDAL; PERINEURAL at 07:56

## 2022-10-13 RX ADMIN — SODIUM CHLORIDE: 9 INJECTION, SOLUTION INTRAVENOUS at 06:59

## 2022-10-13 ASSESSMENT — PAIN - FUNCTIONAL ASSESSMENT: PAIN_FUNCTIONAL_ASSESSMENT: NONE - DENIES PAIN

## 2022-10-13 NOTE — OP NOTE
OPERATIVE REPORT          Patient:  Cecilia Jarvis    YOB: 1965  Date of Service:  10/13/2022    [facility]  [address] [facility]      Preoperative Diagnosis:   Left First dorsal extensor compartment (DeQuervain's) tenosynovitis. Postoperative Diagnosis:   Same    Procedure:    Left First dorsal extensor compartment tendon release. Surgeon:    Roberto Barboza. Graciela Samayoa MD    Surgical Assistant:    WAYNE Melgar Assistant    Anesthesia:  Local with sedation    Blood Loss:  Minimal.    Complications:  None. Tourniquet Time:  3 minutes. Indications:  Ms. Cecilia Jarvis  is a 62y.o. year old female with recalcitrant Left DeQuervain's tenosynovitis. She  has failed conservative treatment measures and at this point has requested surgical treatment. I have discussed preoperatively with her  the complications, limitations, expectations, alternatives and risks of the planned surgical care. All of her questions have been answered fully to her satisfaction. Ms. Cecilia Jarvis  has provided written informed consent to proceed. Procedure:  After written consent was obtained and the proper operative was identified and marked, Ms. Cecilia Jarvis was brought to the operating room and placed in the supine position on the operating table with the Left arm extended upon the hand table. Under an appropriate level of sedation, local anesthetic was instilled in the planned surgical field. The Left upper extremity was prepped and draped in the usual sterile fashion. After Esmarch exsanguination, the pneumatic tourniquet was inflated to 250 mmHg. A transverse incision was fashioned 1 cm proximal to the radial styloid over the palpable first dorsal compartment tendon sheath. Great care was taken in the subcutaneous dissection to avoid and protect the radial sensory nerve branches in the region. The soft tissue was cleared from the first dorsal compartment sheath.   The sheath was incised longitudinally from it's distal to proximal extent and the tendons were visualized. Careful inspection did reveal a, accessory subcompartment for the extensor pollicis brevis tendon which did require separate release. With multiple slips of the abductor pollicis longus and the extensor pollicis brevis, all fully released, the tendons were gently partially withdrawn, inspected, and found to be free of visible abnormality. They were returned to their appropriate anatomic location. The wound was irrigated copiously with sterile for irrigation and the pneumo-tourniquet was deflated after a period of 3 minutes elevation. Hemostasis was obtained with direct pressure electrocautery, and the fingers were immediately pink and well perfused. The skin was closed with interrupted sutures. The wound was dressed with Adaptic, dry sterile dressings, and a bulky wrist based dressing was applied. Ms. Heather Robertson was awakened from anesthesia without apparent complication and was returned to the recovery room in stable condition. At the conclusion of the procedure, needle, instrument and sponge counts were correct. Sofía Dolan MD   10/13/2022 , 8:09 AM

## 2022-10-13 NOTE — ANESTHESIA PRE PROCEDURE
[facility] Department of Anesthesiology  Pre-Anesthesia Evaluation/Consultation       Name:  Marky Mathias  : 1965  Age:  62 y.o.                                            MRN:  [id number]  Date: 10/13/2022           Surgeon: Surgeon(s):  Mor Mckeon MD    Procedure: Procedure(s):  LEFT FIRST DORSAL COMPARTMENT (DEQUERVAIN'S ) RELEASE     Allergies   Allergen Reactions    Penicillins Hives    Other Itching     thermersol-preservative for contact solution-pt states got itchy eyes    Nickel Rash     Patient Active Problem List   Diagnosis    Hypercholesteremia due to HDL > 100    Low testosterone level in female    Elevated C-reactive protein (CRP)    Primary osteoarthritis of first carpometacarpal joint of left hand    Family history of Alzheimer's disease    Psoriasis    Acquired underactive thyroid    Reactive airway disease    Chronic allergic rhinitis    Hyperglycemia    Eustachian catarrh, bilateral    Allergic sinusitis    Neuritis    Vasomotor rhinitis    Diverticulitis    Trigger middle finger of right hand    History of colonic diverticulitis    Trigger finger, acquired    Anxiety    Hypertension, essential    De Quervain's tenosynovitis, left    Preop cardiovascular exam     Past Medical History:   Diagnosis Date    Abnormal Pap smear of cervix     Acquired underactive thyroid 2017    Asthma     when have colds goes into asthma    Chronic allergic rhinitis 2018    Diverticulitis 2006    colon 2006    Family history of Alzheimer's disease 11/15/2016    Fibrocystic breast     Hypercholesteremia due to HDL > 100 10/17/2013    no meds    Irregular uterine bleeding     Left hand pain 10/2022    Menopause ovarian failure     Primary osteoarthritis of first carpometacarpal joint of left hand 2015     Past Surgical History:   Procedure Laterality Date    BREAST BIOPSY      COLONOSCOPY      x2   Edison Mikhail An laser liposuction-abdomen and thighs    EYE SURGERY      corrective    FINGER TRIGGER RELEASE Left 5/19/16    Middle Finger    FINGER TRIGGER RELEASE Right 1/20/2022    RIGHT INDEX FINGER, MIDDLE FINGER AND RING FINGER TRIGGER FINGER RELEASE performed by Willi Franz MD at [address] [place] [address] HAND SURGERY Left 5/8/14    Middle Finger Ulnar Digital Nerve contusion and neuropraxia    SMALL INTESTINE SURGERY N/A 11/18/2020    SIGMOID COLECTOMY performed by Nahum Bear MD at [facility] History     Tobacco Use    Smoking status: Never    Smokeless tobacco: Never    Tobacco comments:     congrats   Vaping Use    Vaping Use: Never used   Substance Use Topics    Alcohol use: Yes     Alcohol/week: 5.0 standard drinks     Types: 5 Standard drinks or equivalent per week     Comment: socially    Drug use: Never     Medications  No current facility-administered medications on file prior to encounter. Current Outpatient Medications on File Prior to Encounter   Medication Sig Dispense Refill    losartan (COZAAR) 50 MG tablet Take 1 tablet by mouth daily 30 tablet 2    liothyronine (CYTOMEL) 5 MCG tablet TAKE TWO TABLETS BY MOUTH EVERY  tablet 3    buPROPion (WELLBUTRIN XL) 150 MG extended release tablet TAKE ONE TABLET BY MOUTH EVERY MORNING 90 tablet 1    progesterone (PROMETRIUM) 100 MG CAPS capsule TAKE ONE CAPSULE BY MOUTH NIGHTLY 90 capsule 3    estradiol (ESTRACE) 1 mg tablet TAKE 1 TABLET BY MOUTH ONE TIME A DAY 90 tablet 3    ipratropium (ATROVENT) 0.03 % nasal spray 2 sprays by Nasal route as needed (nasal drainage) 30 mL 5    loratadine (CLARITIN) 10 MG capsule Take 10 mg by mouth as needed      Multiple Vitamin (MULTIVITAMINS PO) Take 1 tablet by mouth daily       Ascorbic Acid (VITAMIN C) 500 MG tablet Take 500 mg by mouth daily.  magnesium 30 MG tablet Take 400 mg by mouth daily       zinc gluconate 50 MG tablet Take 50 mg by mouth daily.        Current Facility-Administered Medications   Medication Dose Route Frequency Provider Last Rate Last Admin    0.9 % sodium chloride infusion   IntraVENous Continuous Kiera Harris MD        sodium chloride flush 0.9 % injection 5-40 mL  5-40 mL IntraVENous 2 times per day Kiera Harris MD        sodium chloride flush 0.9 % injection 5-40 mL  5-40 mL IntraVENous PRN Kiera Harris MD        0.9 % sodium chloride infusion   IntraVENous PRN Kiera Harris MD         Vital Signs (Current)   Vitals:    10/13/22 0652   BP: 135/80   Pulse: 78   Resp: 14   Temp: 97 °F (36.1 °C)   SpO2: 97%     Vital Signs Statistics (for past 48 hrs)     Temp  Av °F (36.1 °C)  Min: 97 °F (36.1 °C)   Min taken time: 10/13/22 0652  Max: 97 °F (36.1 °C)   Max taken time: 10/13/22 0652  Pulse  Av  Min: 66   Min taken time: 10/13/22 0652  Max: 66   Max taken time: 10/13/22 0652  Resp  Av  Min: 15   Min taken time: 10/13/22 0652  Max: 15   Max taken time: 10/13/22 0652  BP  Min: 135/80   Min taken time: 10/13/22 0652  Max: 135/80   Max taken time: 10/13/22 0652  SpO2  Av %  Min: 97 %   Min taken time: 10/13/22 0652  Max: 97 %   Max taken time: 10/13/22 0652    BP Readings from Last 3 Encounters:   10/13/22 135/80   10/11/22 132/86   22 128/82     BMI  Body mass index is 32.27 kg/m². Estimated body mass index is 32.27 kg/m² as calculated from the following:    Height as of 10/11/22: 5' 4\" (1.626 m). Weight as of this encounter: 188 lb (85.3 kg).     CBC   Lab Results   Component Value Date/Time    WBC 4.7 2022 07:18 AM    RBC 4.43 2022 07:18 AM    HGB 13.1 2022 07:18 AM    HCT 38.3 2022 07:18 AM    MCV 86.6 2022 07:18 AM    RDW 13.7 2022 07:18 AM     2022 07:18 AM     CMP    Lab Results   Component Value Date/Time     2022 07:33 AM    K 4.4 2022 07:33 AM    K 4.0 2022 07:49 AM     2022 07:33 AM    CO2 23 2022 07:33 AM    BUN 11 06/30/2022 07:33 AM    CREATININE 0.8 06/30/2022 07:33 AM    GFRAA >60 06/30/2022 07:33 AM    GFRAA >60 03/22/2012 06:21 PM    AGRATIO 1.8 06/01/2022 07:18 AM    LABGLOM >60 06/30/2022 07:33 AM    GLUCOSE 114 06/30/2022 07:33 AM    PROT 6.7 06/01/2022 07:18 AM    PROT 6.8 03/22/2012 06:21 PM    CALCIUM 9.1 06/30/2022 07:33 AM    BILITOT <0.2 06/01/2022 07:18 AM    ALKPHOS 98 06/01/2022 07:18 AM    AST 23 06/01/2022 07:18 AM    ALT 11 06/01/2022 07:18 AM     BMP    Lab Results   Component Value Date/Time     06/30/2022 07:33 AM    K 4.4 06/30/2022 07:33 AM    K 4.0 05/17/2022 07:49 AM     06/30/2022 07:33 AM    CO2 23 06/30/2022 07:33 AM    BUN 11 06/30/2022 07:33 AM    CREATININE 0.8 06/30/2022 07:33 AM    CALCIUM 9.1 06/30/2022 07:33 AM    GFRAA >60 06/30/2022 07:33 AM    GFRAA >60 03/22/2012 06:21 PM    LABGLOM >60 06/30/2022 07:33 AM    GLUCOSE 114 06/30/2022 07:33 AM     POCGlucose  No results for input(s): GLUCOSE in the last 72 hours.    Coags    Lab Results   Component Value Date/Time    PROTIME 10.4 05/17/2022 07:49 AM    INR 0.92 05/17/2022 07:49 AM    APTT 26.2 10/04/2020 06:36 AM     HCG (If Applicable)   Lab Results   Component Value Date    PREGTESTUR Negative 05/19/2016      ABGs No results found for: PHART, PO2ART, ZML7RXQ, MIQ3ZMC, BEART, T9EYPDVU   Type & Screen (If Applicable)  No results found for: LABABO, LABRH                         BMI: Wt Readings from Last 3 Encounters:       NPO Status:   Date of last liquid consumption: 10/12/22   Time of last liquid consumption: 2100   Date of last solid food consumption: 10/12/22      Time of last solid consumption: 2100       Anesthesia Evaluation  Patient summary reviewed no history of anesthetic complications:   Airway: Mallampati: III  TM distance: >3 FB   Neck ROM: full  Mouth opening: > = 3 FB   Dental: normal exam         Pulmonary:normal exam    (+) asthma:                            Cardiovascular:  Exercise tolerance: good (>4 METS), (+) hypertension:, hyperlipidemia      ECG reviewed  Rhythm: regular  Rate: normal           Beta Blocker:  Not on Beta Blocker         Neuro/Psych:   (+) psychiatric history:depression/anxiety             GI/Hepatic/Renal:        (-) GERD       Endo/Other:    (+) hypothyroidism::., .    (-) blood dyscrasia               Abdominal:   (+) obese,           Vascular: negative vascular ROS. Other Findings:           Anesthesia Plan      MAC     ASA 3       Induction: intravenous. MIPS: Postoperative opioids intended and Prophylactic antiemetics administered. Anesthetic plan and risks discussed with patient and spouse. Plan discussed with CRNA. This pre-anesthesia assessment may be used as a history and physical.    DOS STAFF ADDENDUM:    Pt seen and examined, chart reviewed (including anesthesia, drug and allergy history). No interval changes to history and physical examination. Anesthetic plan, risks, benefits, alternatives, and personnel involved discussed with patient. Questions and concerns addressed. Patient(family) verbalized an understanding and agrees to proceed.       Dottie Chi MD  October 13, 2022  6:53 AM

## 2022-10-13 NOTE — ANESTHESIA POSTPROCEDURE EVALUATION
[facility] Department of Anesthesiology  Post-Anesthesia Note       Name:  Danelle Reeves                                  Age:  62 y.o. MRN:  [id number]     Last Vitals & Oxygen Saturation: /87   Pulse 70   Temp 97.8 °F (36.6 °C) (Temporal)   Resp 16   Ht 5' 4\" (1.626 m)   Wt 188 lb (85.3 kg)   LMP 12/15/2015   SpO2 96%   BMI 32.27 kg/m²   Patient Vitals for the past 4 hrs:   BP Temp Temp src Pulse Resp SpO2 Weight   10/13/22 0841 132/87 97.8 °F (36.6 °C) Temporal 70 16 96 % --   10/13/22 0835 129/83 97 °F (36.1 °C) -- 69 17 94 % --   10/13/22 0830 131/78 -- -- 67 14 95 % --   10/13/22 0825 126/83 -- -- 65 14 93 % --   10/13/22 0820 127/77 -- -- 69 14 91 % --   10/13/22 0815 123/77 -- -- 71 18 94 % --   10/13/22 0813 122/75 97.5 °F (36.4 °C) Temporal 72 14 95 % --   10/13/22 0652 135/80 97 °F (36.1 °C) Temporal 78 14 97 % --   10/13/22 0639 -- -- -- -- -- -- 188 lb (85.3 kg)       Level of consciousness:  Awake, alert    Respiratory: Respirations easy, no distress. Stable. Cardiovascular: Hemodynamically stable. Hydration: Adequate. PONV: Adequately managed. Post-op pain: Adequately controlled. Post-op assessment: Tolerated anesthetic well without complication. Complications:  None.     Ramona Vang MD  October 13, 2022   9:57 AM

## 2022-10-13 NOTE — PROGRESS NOTES
Pt arrived to phase 2 from PACU. Pt awake and alert. Left hand dressing C/D/I. Fingers warm. Cap refill WNL. Pt denies c/o pain.

## 2022-10-13 NOTE — DISCHARGE INSTRUCTIONS
Post-Operative Instructions    Tendonitis Release:    Keep hand elevated with fingers above eye-level to control swelling. Keep hand and bandage clean and dry. Do not change or unwrap bandage. Please leave bandage in place until your follow-up appointment. Maintain finger motion by fully straightening and fully bending fingers at least once an hour (while awake). This may cause some discomfort, but will not damage surgery. You may use your operated hand for lightweight tasks (e.g. writing, eating, dressing, etc.). NO LIFTING, CARRYING OR HEAVY USE. Most Patients do not have \"Serious Pain\" after this procedure and thus most patients do not require prescription pain medication. You may take over the counter medication (Tylenol, Advil, Aleve, etc.) as needed. If you are unable to tolerate the discomfort after your surgery and the OTC medications do not provide some relief, you may contact our office to discuss other options. .    Please call the office at [phone]  in 24 - 48 hours to schedule a follow up appointment for 7 - 10  days after surgery. Please call the office at [phone]  if you have any questions or problems. Devan Samayoa MD

## 2022-10-13 NOTE — PROGRESS NOTES
Patient admitted to PACU # 5 from OR at 0813 post LEFT FIRST DORSAL COMPARTMENT ([address] ) RELEASE  per Dr. Mariana Rangel. Attached to PACU monitoring system and report received from anesthesia provider. Patient was reported to be hemodynamically stable during procedure. Patient drowsy on admission and denied pain.

## 2022-10-14 PROBLEM — M65.4 DE QUERVAIN'S DISEASE (RADIAL STYLOID TENOSYNOVITIS): Status: ACTIVE | Noted: 2022-10-14

## 2022-10-17 ENCOUNTER — HOSPITAL ENCOUNTER (OUTPATIENT)
Age: 57
Discharge: HOME | End: 2022-10-17
Payer: COMMERCIAL

## 2022-10-17 ENCOUNTER — HOSPITAL ENCOUNTER (OUTPATIENT)
Dept: GENERAL RADIOLOGY | Age: 57
Discharge: HOME | End: 2022-10-17
Payer: COMMERCIAL

## 2022-10-17 DIAGNOSIS — N95.0 PMB (POSTMENOPAUSAL BLEEDING): Primary | ICD-10-CM

## 2022-10-17 DIAGNOSIS — L40.50 PSORIATIC ARTHRITIS (HCC): ICD-10-CM

## 2022-10-17 LAB
A/G RATIO: 1.7 (ref 1.1–2.2)
ALBUMIN SERPL-MCNC: 4.2 G/DL (ref 3.4–5)
ALP BLD-CCNC: 87 U/L (ref 40–129)
ALT SERPL-CCNC: 9 U/L (ref 10–40)
ANION GAP SERPL CALCULATED.3IONS-SCNC: 12 MMOL/L (ref 3–16)
AST SERPL-CCNC: 20 U/L (ref 15–37)
BASOPHILS ABSOLUTE: 0 K/UL (ref 0–0.2)
BASOPHILS RELATIVE PERCENT: 0.5 %
BILIRUB SERPL-MCNC: <0.2 MG/DL (ref 0–1)
BUN BLDV-MCNC: 15 MG/DL (ref 7–20)
C-REACTIVE PROTEIN: 21.3 MG/L (ref 0–5.1)
CALCIUM SERPL-MCNC: 9.2 MG/DL (ref 8.3–10.6)
CHLORIDE BLD-SCNC: 97 MMOL/L (ref 99–110)
CO2: 25 MMOL/L (ref 21–32)
CREAT SERPL-MCNC: 0.9 MG/DL (ref 0.6–1.1)
EOSINOPHILS ABSOLUTE: 0 K/UL (ref 0–0.6)
EOSINOPHILS RELATIVE PERCENT: 0.9 %
GFR SERPL CREATININE-BSD FRML MDRD: >60 ML/MIN/{1.73_M2}
GLUCOSE BLD-MCNC: 101 MG/DL (ref 70–99)
HCT VFR BLD CALC: 36.1 % (ref 36–48)
HEMOGLOBIN: 12.4 G/DL (ref 12–16)
HEPATITIS B CORE IGM ANTIBODY: NORMAL
HEPATITIS B SURFACE ANTIGEN INTERPRETATION: NORMAL
HEPATITIS C ANTIBODY INTERPRETATION: NORMAL
LYMPHOCYTES ABSOLUTE: 1 K/UL (ref 1–5.1)
LYMPHOCYTES RELATIVE PERCENT: 21 %
MCH RBC QN AUTO: 29.9 PG (ref 26–34)
MCHC RBC AUTO-ENTMCNC: 34.5 G/DL (ref 31–36)
MCV RBC AUTO: 86.7 FL (ref 80–100)
MONOCYTES ABSOLUTE: 0.3 K/UL (ref 0–1.3)
MONOCYTES RELATIVE PERCENT: 5.5 %
NEUTROPHILS ABSOLUTE: 3.6 K/UL (ref 1.7–7.7)
NEUTROPHILS RELATIVE PERCENT: 72.1 %
PDW BLD-RTO: 13.5 % (ref 12.4–15.4)
PLATELET # BLD: 337 K/UL (ref 135–450)
PMV BLD AUTO: 7 FL (ref 5–10.5)
POTASSIUM SERPL-SCNC: 4.6 MMOL/L (ref 3.5–5.1)
RBC # BLD: 4.16 M/UL (ref 4–5.2)
SEDIMENTATION RATE, ERYTHROCYTE: 21 MM/HR (ref 0–30)
SODIUM BLD-SCNC: 134 MMOL/L (ref 136–145)
TOTAL PROTEIN: 6.7 G/DL (ref 6.4–8.2)
WBC # BLD: 5 K/UL (ref 4–11)

## 2022-10-17 PROCEDURE — 73630 X-RAY EXAM OF FOOT: CPT

## 2022-10-17 PROCEDURE — 73130 X-RAY EXAM OF HAND: CPT

## 2022-10-17 PROCEDURE — 72200 X-RAY EXAM SI JOINTS: CPT

## 2022-10-18 ENCOUNTER — HOSPITAL ENCOUNTER (OUTPATIENT)
Dept: ULTRASOUND IMAGING | Age: 57
Discharge: HOME | End: 2022-10-18
Payer: COMMERCIAL

## 2022-10-18 DIAGNOSIS — N95.0 PMB (POSTMENOPAUSAL BLEEDING): ICD-10-CM

## 2022-10-18 PROCEDURE — 76856 US EXAM PELVIC COMPLETE: CPT

## 2022-10-18 PROCEDURE — 76830 TRANSVAGINAL US NON-OB: CPT

## 2022-10-20 LAB — HLA B27: NEGATIVE

## 2022-10-21 ENCOUNTER — OFFICE VISIT (OUTPATIENT)
Dept: ORTHOPEDIC SURGERY | Age: 57
End: 2022-10-21

## 2022-10-21 VITALS — BODY MASS INDEX: 32.1 KG/M2 | HEIGHT: 64 IN | RESPIRATION RATE: 16 BRPM | WEIGHT: 188 LBS

## 2022-10-21 DIAGNOSIS — M65.4 DE QUERVAIN'S TENOSYNOVITIS: Primary | ICD-10-CM

## 2022-10-21 DIAGNOSIS — N93.9 ABNORMAL UTERINE BLEEDING (AUB): Primary | ICD-10-CM

## 2022-10-21 LAB
ANTINUCLEAR AB INTERPRETIVE COMMENT: NORMAL
ANTINUCLEAR ANTIBODY, HEP-2, IGG: NORMAL

## 2022-10-21 PROCEDURE — 99024 POSTOP FOLLOW-UP VISIT: CPT | Performed by: ORTHOPAEDIC SURGERY

## 2022-10-21 NOTE — PATIENT INSTRUCTIONS
Postoperative Instructions After Tendonitis Release    Dr. Dustin Holm          After bandages are removed one week from surgery, you may chose to wear a small bandage over the incision if you wish, though you do not need to. Keep incision dry until sutures have fully dissolved  or it has been 14 days since your surgery. Thereafter, you may wash with mild soap and water and shower normally. Once your stiches have fully disappeared & skin appears normal, you should begin gently massaging the incision with Vitamin E (may use Vitamin E lotion or contents of Vitamin E capsule). Work hard on motion of the fingers and wrist, straightening each finger fully and bending each finger fully, bending wrist forward and bending wrist backwards. Do not be concerned if you experience discomfort. This will not damage the surgery. You may begin using the hand as it feels comfortable beginning 12-14 days from the day of surgery. You may not feel entirely comfortable gripping or lifting heavy objects for several weeks. You may expect to see some skin peel off around the incision. You may be left with a small area of pink baby skin. This is quite normal.    Thank you for choosing [facility]) Physicians for your Hand and Upper Extremity needs. If we can be of any further assistance to you, please do not hesitate to contact us.     Office Phone Number:  [phone]  or  [phone]

## 2022-10-21 NOTE — PROGRESS NOTES
Ms. Mahesh Warner returns today in follow-up of her recent left First Dorsal Compartment (DeQuarvain's) Release done approximately 1 week ago. She has done well noting mild discomfort and no other reported complications. She notes pre-operative symptoms to be significantly improved at this time. Physical Exam:  Bandage intact and well cared for. Skin incision is healing well, without erythema, drainage or sign of infection. Digital range of motion is without significant limitation. Wrist range of motion is without significant limitation. Sensation is normal in the Thumb. Vascular examination reveals normal, good capillary refill, and good color. Swelling is minimal.  There is little residual discomfort at the First Dorsal Compartment. Impression:  Ms. Mahesh Warner is doing well after recent left DeQuarvain's Release. Plan:  Ms. Mahesh Warner is instructed in work on Active & Passive range of motion of the digits, wrist, & elbow. These modalities were specifically demonstrated to her today. We discussed the appropriateness of gradual resumption of use of the operated hand and the return to normal use as comfort allows. She is given instructions regarding management of the fresh surgical incision and progressive use of desensitization and tissue massage techniques. We discussed the appropriate expectations and timeline for symptom improvement. She is provided a written patient instruction sheet titled: Postoperative Instructions After DeQuarvain's Release. I have asked Ms. Mahesh Warner to follow-up with me or contact me by telephone over the next 2-4 weeks if her symptoms have not fully resolved or if she has not regained full & painless return of function. She is also specifically instructed to return to the office or call for an appointment sooner if her symptoms are changing or worsening prior to that time.

## 2022-10-26 PROBLEM — Z01.810 PREOP CARDIOVASCULAR EXAM: Status: RESOLVED | Noted: 2022-09-26 | Resolved: 2022-10-26

## 2022-11-11 RX ORDER — LOSARTAN POTASSIUM 50 MG/1
TABLET ORAL
Qty: 30 TABLET | Refills: 2 | Status: SHIPPED | OUTPATIENT
Start: 2022-11-11

## 2022-11-16 ENCOUNTER — OFFICE VISIT (OUTPATIENT)
Dept: ENT CLINIC | Age: 57
End: 2022-11-16
Payer: COMMERCIAL

## 2022-11-16 VITALS
HEART RATE: 97 BPM | SYSTOLIC BLOOD PRESSURE: 138 MMHG | BODY MASS INDEX: 32.27 KG/M2 | WEIGHT: 188 LBS | DIASTOLIC BLOOD PRESSURE: 86 MMHG

## 2022-11-16 DIAGNOSIS — J34.2 DEVIATED SEPTUM: ICD-10-CM

## 2022-11-16 DIAGNOSIS — R09.81 NASAL CONGESTION: Primary | ICD-10-CM

## 2022-11-16 DIAGNOSIS — J34.3 HYPERTROPHY OF INFERIOR NASAL TURBINATE: ICD-10-CM

## 2022-11-16 DIAGNOSIS — J34.89 NASAL VALVE COLLAPSE: ICD-10-CM

## 2022-11-16 PROCEDURE — 3074F SYST BP LT 130 MM HG: CPT | Performed by: OTOLARYNGOLOGY

## 2022-11-16 PROCEDURE — 3078F DIAST BP <80 MM HG: CPT | Performed by: OTOLARYNGOLOGY

## 2022-11-16 PROCEDURE — 99204 OFFICE O/P NEW MOD 45 MIN: CPT | Performed by: OTOLARYNGOLOGY

## 2022-11-16 NOTE — PROGRESS NOTES
[facility]      Patient Name: [facility] Record Number:  [id number]  Primary Care Physician:  Ravin Torres MD  Date of Consultation: 11/16/2022    Chief Complaint: Nasal congestion        HISTORY OF PRESENT ILLNESS  Fanta is a(n) 62 y.o. female who presents for evaluation of nasal congestion. The patient says she does not breathe very well out of her nose for a while. Right worse than the left. Sense smell is somewhat diminished. She is never had surgery nor has she had any significant nasal trauma. She is tried [place] and other over-the-counter medications. She also tried Breathe Right strips which did not work. She does have some snoring. She does not feel as though she has any significant pauses in her breathing or symptoms consistent with sleep apnea            REVIEW OF SYSTEMS    As above  PHYSICAL EXAM  GENERAL: No Acute Distress, Alert and Oriented, no Hoarseness, strong voice  EYES: EOMI, Anti-icteric  HENT:   Head: Normocephalic and atraumatic. Face:  Symmetric, facial nerve intact, no sinus tenderness  Right Ear: Normal external ear, normal external auditory canal, intact tympanic membrane with normal mobility and aerated middle ear  Left Ear: Normal external ear, normal external auditory canal, intact tympanic membrane with normal mobility and aerated middle ear  Mouth/Oral Cavity:  normal lips, Uvula is midline, no mucosal lesions, no trismus  Oropharynx/Larynx:  normal oropharynx  Nose: The patient has a very narrow nasal aperture bilateral.  This is worse on the right than left. She has lateral displacement of the medial crura of the right lower lateral cartilage. She has severe bilateral internal and some external nasal valve collapse worse on the right than left. She has a bit of tip ptosis as well. Elevation of the tip resulted in improvement in nasal airway.   Also modified [place] maneuver resulted in improvement of the nasal airway. anterior rhinoscopy shows a bit of a rightward septal deviation with very large turbinates. No polyps or purulent drainage. NECK: Normal range of motion, no thyromegaly, trachea is midline, no lymphadenopathy, no neck masses, no crepitus  CHEST: Normal respiratory effort, no retractions, breathing comfortably  SKIN: No rashes, normal appearing skin, no evidence of skin lesions/tumors  Neuro:  cranial nerve II-XII intact; normal gait  Cardio:  no edema                ASSESSMENT/PLAN  1. Nasal congestion  This is multifactorial.  She does have a deviated septum, however its not severe enough to explain the degree of congestion she has. She has valve collapse worse on the right than left. This consist of both the internal and external valve. She has complete closure of the right nasal cavity on inspiration secondary to the internal/external valve collapse. She also has fairly large turbinates. I think that the patient would benefit from a septoplasty and we would need to address the valve collapse with  grafts or probably even a batten graft on the right side as well as some elevation of the nasal tip to correct some of the ptosis. I would also recommend inferior turbinate reduction. She understands the risk includes ongoing nasal congestion, nosebleed, and cosmetic changes to her nose, septal perforation and need for additional surgeries. She is very motivated to have this done. 2. Deviated septum  Plan septoplasty    3. Nasal valve collapse  Plan nasal valve repair    4. Hypertrophy of inferior nasal turbinate  Plan inferior turbinate reduction             I have performed a head and neck physical exam personally or was physically present during the key or critical portions of the service. This note was generated completely or in part utilizing Dragon dictation speech recognition software.   Occasionally, words are mistranscribed and despite editing, the text may contain inaccuracies due to incorrect word recognition. If further clarification is needed please contact the office at [phone].

## 2022-11-21 ENCOUNTER — OFFICE VISIT (OUTPATIENT)
Dept: RHEUMATOLOGY | Age: 57
End: 2022-11-21
Payer: COMMERCIAL

## 2022-11-21 VITALS — WEIGHT: 189 LBS | SYSTOLIC BLOOD PRESSURE: 138 MMHG | DIASTOLIC BLOOD PRESSURE: 82 MMHG | BODY MASS INDEX: 32.44 KG/M2

## 2022-11-21 DIAGNOSIS — L40.50 PSORIATIC ARTHRITIS (HCC): Primary | ICD-10-CM

## 2022-11-21 DIAGNOSIS — Z79.899 HIGH RISK MEDICATION USE: ICD-10-CM

## 2022-11-21 DIAGNOSIS — L40.50 PSORIATIC ARTHRITIS (HCC): ICD-10-CM

## 2022-11-21 DIAGNOSIS — M15.9 PRIMARY OSTEOARTHRITIS INVOLVING MULTIPLE JOINTS: ICD-10-CM

## 2022-11-21 DIAGNOSIS — L40.9 PSORIASIS: ICD-10-CM

## 2022-11-21 LAB
ALT SERPL-CCNC: 8 U/L (ref 10–40)
AST SERPL-CCNC: 19 U/L (ref 15–37)
BASOPHILS ABSOLUTE: 0.1 K/UL (ref 0–0.2)
BASOPHILS RELATIVE PERCENT: 1.1 %
CREAT SERPL-MCNC: 0.8 MG/DL (ref 0.6–1.1)
EOSINOPHILS ABSOLUTE: 0.1 K/UL (ref 0–0.6)
EOSINOPHILS RELATIVE PERCENT: 1.1 %
GFR SERPL CREATININE-BSD FRML MDRD: >60 ML/MIN/{1.73_M2}
HCT VFR BLD CALC: 35.3 % (ref 36–48)
HEMOGLOBIN: 12.1 G/DL (ref 12–16)
LYMPHOCYTES ABSOLUTE: 1.4 K/UL (ref 1–5.1)
LYMPHOCYTES RELATIVE PERCENT: 23.1 %
MCH RBC QN AUTO: 29.7 PG (ref 26–34)
MCHC RBC AUTO-ENTMCNC: 34.2 G/DL (ref 31–36)
MCV RBC AUTO: 86.8 FL (ref 80–100)
MONOCYTES ABSOLUTE: 0.4 K/UL (ref 0–1.3)
MONOCYTES RELATIVE PERCENT: 6.4 %
NEUTROPHILS ABSOLUTE: 4.1 K/UL (ref 1.7–7.7)
NEUTROPHILS RELATIVE PERCENT: 68.3 %
PDW BLD-RTO: 13.7 % (ref 12.4–15.4)
PLATELET # BLD: 336 K/UL (ref 135–450)
PMV BLD AUTO: 7.5 FL (ref 5–10.5)
RBC # BLD: 4.07 M/UL (ref 4–5.2)
WBC # BLD: 6 K/UL (ref 4–11)

## 2022-11-21 PROCEDURE — 3074F SYST BP LT 130 MM HG: CPT | Performed by: INTERNAL MEDICINE

## 2022-11-21 PROCEDURE — 3078F DIAST BP <80 MM HG: CPT | Performed by: INTERNAL MEDICINE

## 2022-11-21 PROCEDURE — 99214 OFFICE O/P EST MOD 30 MIN: CPT | Performed by: INTERNAL MEDICINE

## 2022-11-21 RX ORDER — SULFASALAZINE 500 MG/1
TABLET ORAL
Qty: 120 TABLET | Refills: 2 | Status: SHIPPED | OUTPATIENT
Start: 2022-11-21

## 2022-11-21 NOTE — PROGRESS NOTES
2022  Patient Name: Clare Keller  : 1965  Medical Record: [id number]      ASSESSMENT AND PLAN    Assessment/Plan:      ASSESSMENT:    1. Psoriatic arthritis ([facility] [address]    2. Primary osteoarthritis involving multiple joints    3. Psoriasis    4. High risk medication use        PLAN:   1. Psoriatic arthritis (HCC)  Peripheral arthritis, plantar fasciitis, psoriasis, nail changes, inflammatory back pain [SI joint pain]  RF, CCP, LEOLA by IFA, HLA-B27 negative, ESR normal, CRP 18.2. Hand and foot x-rays, SI joint x-rays are normal.  Reports improvement with prednisone. No active synovitis on joint exam  Most likely psoriatic arthritis with component of osteoarthritis contributing to the joint symptoms  I will start sulfasalazine 500 mg twice a day for 2 weeks and then increase to 1000 mg twice a day  Over-the-counter Aleve or Advil as needed  -     GLUCOSE 6 PHOSPHATE DEHYDROGENASE; Future    2. Primary osteoarthritis involving multiple joints  Over-the-counter Aleve or Advil as needed. 3. Psoriasis  Start sulfasalazine and continue topical clobetasol as needed. Continue follow-up with a dermatologist    4. High risk medication use  Sulfasalazine can cause live function abnormality, bone marrow toxicity, nausea and need to check the blood work every month for first 3 months and then every 2 months and were explained to the patient. Up-to-date with flu and shingles vaccine. Recommend pneumonia vaccine  -     ALT; Standing  -     AST; Standing  -     CBC with Auto Differential; Standing  -     Creatinine; Standing     The patient indicates understanding of these issues and agrees with the plan. Return in about 3 months (around 2023). The risks and benefits of my recommendations, as well as other treatment options, benefits and side effects werediscussed with the patient. All questions were answered.        MEDICATIONS  Current Outpatient Medications   Medication Sig Dispense Refill sulfaSALAzine (AZULFIDINE) 500 MG tablet Take 1 tablet twice a day for 2 weeks and then increase to 2 tablets twice a day 120 tablet 2    losartan (COZAAR) 50 MG tablet TAKE 1 TABLET BY MOUTH ONE TIME A DAY 30 tablet 2    hydroCHLOROthiazide (HYDRODIURIL) 25 MG tablet TAKE ONE TABLET BY MOUTH EVERY MORNING 90 tablet 0    liothyronine (CYTOMEL) 5 MCG tablet TAKE TWO TABLETS BY MOUTH EVERY  tablet 3    buPROPion (WELLBUTRIN XL) 150 MG extended release tablet TAKE ONE TABLET BY MOUTH EVERY MORNING 90 tablet 1    progesterone (PROMETRIUM) 100 MG CAPS capsule TAKE ONE CAPSULE BY MOUTH NIGHTLY 90 capsule 3    estradiol (ESTRACE) 1 mg tablet TAKE 1 TABLET BY MOUTH ONE TIME A DAY 90 tablet 3    ipratropium (ATROVENT) 0.03 % nasal spray 2 sprays by Nasal route as needed (nasal drainage) 30 mL 5    Multiple Vitamin (MULTIVITAMINS PO) Take 1 tablet by mouth daily       Ascorbic Acid (VITAMIN C) 500 MG tablet Take 500 mg by mouth daily. magnesium 30 MG tablet Take 400 mg by mouth daily       zinc gluconate 50 MG tablet Take 50 mg by mouth daily. predniSONE (DELTASONE) 5 MG tablet Take 3 tabs for 10 days, 2 tabs daily for 10 days and 1 tab daily 60 tablet 1     No current facility-administered medications for this visit. ALLERGIES  Allergies   Allergen Reactions    Penicillins Hives    Other Itching     thermersol-preservative for contact solution-pt states got itchy eyes    Nickel Rash         Comments  No specialty comments available. Background rheum history: History of psoriatic arthritis [onset 2020], psoriasis, osteoarthritis. Other significant medical history of anxiety, hypertension, hypothyroidism. RF, CCP, LEOLA, HLA-B27 is negative    Current Rheum medications: Topical clobetasol as needed    Interim history: She presents for follow-up of psoriatic arthritis, osteoarthritis, psoriasis. She is off of prednisone. She reports improvement in joint pain, swelling and stiffness on prednisone. Low back pain has improved as well. She denies any swelling in the joints. She has a history of plantar fasciitis, trigger finger and left de Quervain tenosynovitis. Rashes improved as well. She uses topical clobetasol which helps. She sees a dermatologist.  Her sister and dad has psoriasis. She denies family history of rheumatoid arthritis. She denies dactylitis, inflammatory bowel disease, uveitis. Blood work showed negative LEOLA by IFA, HLA-B27, RF, CCP, hepatitis panel. ESR is normal and CRP is elevated. Hand, foot and SI joint x-rays do not show any erosions or significant degenerative change    HPI  Review of Systems    REVIEW OF SYSTEMS: Positive for dry eyes, dry mouth, rash, anxiety  Constitutional: No unanticipated weight loss or fevers. Integumentary: No photosensitivity, malar rash, livedo reticularis, alopecia and Raynaud's symptoms, sclerodactyly, skin tightening  Eyes: negative for visual disturbance and persistent redness, discharge from eyes   ENT: - No tinnitus, loss of hearing, vertigo, or recurrent ear infections.  - No history of nasal/oral ulcers. Cardiovascular: No history of pericarditis, chest pain or murmur or palpitations  Respiratory: No shortness of breath, cough or history of interstitial lung disease. No history of pleurisy. No history of tuberculosis or atypical infections. Gastrointestinal: No history of heart burn, dysphagia or esophageal dysmotility. No change in bowel habits or any inflammatory bowel disease. Genitourinary: No history of renal disease, miscarriages. Hematologic/Lymphatic: No abnormal bruising or bleeding, blood clots or swollen lymph nodes. Neurological: No history of headaches, seizure or focal weakness.  No history of neuropathies, paresthesias or hyperesthesias, facial droop, diplopia  Psychiatric: No history of bipolar disease, depression  Endocrine: Denies any polyuria, polydipsia and osteoporosis  Allergic/Immunologic: No nasal congestion or hives.        I have reviewed patients Past medical History, Social History and Family History as mentioned in her chart and this remains unchanged fromprevious.     Past Medical History:   Diagnosis Date    Abnormal Pap smear of cervix     Acquired underactive thyroid 04/20/2017    Asthma     when have colds goes into asthma    Chronic allergic rhinitis 04/03/2018    Diverticulitis 2006    colon 2006    Family history of Alzheimer's disease 11/15/2016    Fibrocystic breast     Hypercholesteremia due to HDL > 100 10/17/2013    no meds    Irregular uterine bleeding     Left hand pain 10/2022    Menopause ovarian failure     Primary osteoarthritis of first carpometacarpal joint of left hand 09/25/2015     Past Surgical History:   Procedure Laterality Date    BREAST BIOPSY      COLONOSCOPY      x2    COLPOSCOPY      Dr. Dino Hart liposuction-abdomen and thighs    EYE SURGERY      corrective    FINGER TRIGGER RELEASE Left 5/19/16    Middle Finger    FINGER TRIGGER RELEASE Right 1/20/2022    RIGHT INDEX FINGER, MIDDLE FINGER AND RING FINGER TRIGGER FINGER RELEASE performed by Renetta Carrion MD at [facility] Left 5/8/14    Middle Finger Ulnar Digital Nerve contusion and neuropraxia    SMALL INTESTINE SURGERY N/A 11/18/2020    SIGMOID COLECTOMY performed by Nicole Driver MD at [address] [place] [address] Drive Left 10/13/2022    LEFT FIRST DORSAL COMPARTMENT (DEQUERVAIN'S ) RELEASE performed by Renetta Carrion MD at [place] [address] History     Socioeconomic History    Marital status: Single     Spouse name: Not on file    Number of children: 0    Years of education: Not on file    Highest education level: Not on file   Occupational History    Occupation: Physical Therapist     Employer: [address] [place] [address]: [employer]   Tobacco Use    Smoking status: Never    Smokeless tobacco: Never    Tobacco comments:     congrats   Vaping Use    Vaping Use: Never used   Substance and Sexual Activity    Alcohol use: Yes     Alcohol/week: 5.0 standard drinks     Types: 5 Standard drinks or equivalent per week     Comment: socially    Drug use: Never    Sexual activity: Yes     Partners: Male     Birth control/protection: Condom   Other Topics Concern    Not on file   Social History Narrative    Self-breast exams: yes. Exercise: walking and cardiovascular equipment three times a week.   Seatbelt use: Always    Physical therapist     Social Determinants of Health     Financial Resource Strain: Low Risk     Difficulty of Paying Living Expenses: Not hard at all   Food Insecurity: No Food Insecurity    Worried About Running Out of Food in the Last Year: Never true    Ran Out of Food in the Last Year: Never true   Transportation Needs: Not on file   Physical Activity: Not on file   Stress: Not on file   Social Connections: Not on file   Intimate Partner Violence: Not on file   Housing Stability: Not on file     Family History   Problem Relation Age of Onset    Cancer Mother         uterine sarcoma, skin non-melanoma    Stroke Mother     Alzheimer's Disease Mother     Cancer Father         skin non-melanoma    Alzheimer's Disease Father          PHYSICAL EXAM   Vitals:    11/21/22 1519   BP: 138/82   Site: Right Upper Arm   Position: Sitting   Cuff Size: Large Adult   Weight: 189 lb (85.7 kg)     Physical Exam  Constitutional:  Well developed, well nourished, no acute distress, non-toxic appearance   Musculoskeletal:    RIGHT  Swell  Tender  ROM  LEFT  Swell  Tender  ROM    DIP2  0  0  [place]berden  0  0  Heberden   DIP3  0  0  Heberden  0  0  Heberden   DIP4  0  0  Heberden  0  0  Heberden   DIP5  0  0  Heberden  0  0  Heberden   PIP1  0  0  Bony change  0  0  Bony change   PIP2  0 0 Bony change    Bony change   PIP3  0 0 Bony change  0  0  Bony change   PIP4  0 0 Bony change  0 0 Bony change   PIP5  0 0 Bony change  0 0 Bony change   MCP1  0  0  Bony change  0  0  Bony change   MCP2  0  0  Bony change  0 0  Bony change   MCP3  0  0  Bony change  0  0  Bony change   MCP4  0  0  FULL   0  0  FULL    MCP5  0  0  FULL   0  0  FULL    Wrist  0  0  FULL   + + FULL    Elbow  0  0  FULL   0  0  FULL    Shouldr  0  0  FULL   0  0  FULL    Hip  0  0  FULL   0  0  FULL    Knee  0  0  Crepitus  0  0  Crepitus   Ankle  0  0  FULL   0  0  FULL    MTP1  0  0  Hallux valgus/bunion  0  0  Hallux valgus/bunion   MTP2  0  0  FULL   0  0  FULL    MTP3  0  0  FULL   0  0  FULL    MTP4  0  0  FULL   0  0  FULL    MTP5  0  0  FULL   0  0  FULL    IP1  0  0  FULL   0  0  FULL    IP2  0  0  FULL   0  0  FULL    IP3  0  0  FULL   0  0  FULL    IP4  0  0  FULL   0  0  FULL    IP5  0  0  FULL   0 0 FULL      Squaring, bony enlargement tenderness of left CMC joint  Bilateral plantar fasciitis  Ambulates without assistance, normal gait  Neck: Full ROM, no tenderness,supple   Back-right SI joint tenderness  Eyes:  PERRL, extra ocular movements intact, conjunctiva normal   HEENT:  Atraumatic, normocephalic, external ears normal, oropharynx moist, no pharyngeal exudates. Respiratory:  No respiratory distress  GI:  Soft, nondistended, normal bowel sounds, nontender, noorganomegaly, no mass, no rebound, no guarding   :  No costovertebral angle tenderness   Integument:  Well hydrated, no telangiectasias. Mild scaly rash on extensor surface of elbows, knees and scalp, nail ridges with mild nail pitting  Lymphatic:  No lymphadenopathy noted   Neurologic:   Alert & oriented x 3, CN 2-12 normal, no focal deficits noted. Sensations Intact. Muscle strength 5/5 proximally and distally in upper and lower extremities.    Psychiatric:  Speech and behavior appropriate           LABS AND IMAGING  Outside data reviewed and in HPI    Lab Results   Component Value Date/Time    WBC 5.0 10/17/2022 11:08 AM    RBC 4.16 10/17/2022 11:08 AM    HGB 12.4 10/17/2022 11:08 AM    HCT 36.1 10/17/2022 11:08 AM     10/17/2022 11:08 AM    MCV 86.7 10/17/2022 11:08 AM MCH 29.9 10/17/2022 11:08 AM    MCHC 34.5 10/17/2022 11:08 AM    RDW 13.5 10/17/2022 11:08 AM    SEGSPCT 59.5 03/22/2012 06:21 PM    LYMPHOPCT 21.0 10/17/2022 11:08 AM    MONOPCT 5.5 10/17/2022 11:08 AM    EOSPCT 0.6 03/22/2012 06:21 PM    BASOPCT 0.5 10/17/2022 11:08 AM    MONOSABS 0.3 10/17/2022 11:08 AM    LYMPHSABS 1.0 10/17/2022 11:08 AM    EOSABS 0.0 10/17/2022 11:08 AM    BASOSABS 0.0 10/17/2022 11:08 AM    DIFFTYPE Scan-K 03/22/2012 06:21 PM       Chemistry        Component Value Date/Time     (L) 10/17/2022 1108    K 4.6 10/17/2022 1108    K 4.0 05/17/2022 0749    CL 97 (L) 10/17/2022 1108    CO2 25 10/17/2022 1108    BUN 15 10/17/2022 1108    CREATININE 0.9 10/17/2022 1108        Component Value Date/Time    CALCIUM 9.2 10/17/2022 1108    ALKPHOS 87 10/17/2022 1108    AST 20 10/17/2022 1108    ALT 9 (L) 10/17/2022 1108    BILITOT <0.2 10/17/2022 1108          Lab Results   Component Value Date    SEDRATE 21 10/17/2022     Lab Results   Component Value Date    CRP 21.3 (H) 10/17/2022     Lab Results   Component Value Date/Time    LEOLA Negative 06/01/2022 07:18 AM     Lab Results   Component Value Date/Time    RF <10.0 06/01/2022 07:18 AM     Lab Results   Component Value Date/Time    LEOLA Negative 06/01/2022 07:18 AM     No results found for: DSDNAG, DSDNAIGGIFA  No results found for: SSAROAB, SSALAAB  No results found for: SMAB, RNPAB  No results found for: CENTABIGG  No results found for: C3, C4, ACE  Lab Results   Component Value Date/Time    VITD25 58.4 05/05/2017 12:55 PM     No results found for: Danelle Lyles  No results found for: Pamela Schwab  Lab Results   Component Value Date    TSH 1.61 06/01/2022     Lab Results   Component Value Date    VITD25 58.4 05/05/2017       Radiology: Bilateral hand, foot and SI joint x-rays 10/17/2022  SI joints:       1. Bilateral SI joints appear patent. 2. Visualized sacral arcuate lines appear intact. 3. No acute osseous abnormality.    Right hand: 1. No significant degenerative change. 2. No acute osseous abnormality. Left hand:       1. No significant degenerative change. 2. No acute osseous abnormality. Right foot:       1. No significant degenerative change. 2. No acute osseous abnormality. Left foot:       1. No significant degenerative change. 2. No acute osseous abnormality. ######################################################################    I thank you for giving me theopportunity to participate in [address] [place] [address]. If you have any questions or concerns please feel free to contact me. I look forward to following  Fanta along with you. Electronically signed by: Kelsey Meraz MD, MD, 11/21/2022 4:08 PM    Documentation was done using voice recognition dragon software. Every effort was made to ensure accuracy;however, inadvertent unintentional computerized transcription errors may be present.

## 2022-11-23 LAB — G-6-PD, QUANT: 14.6 U/G HB (ref 9.9–16.6)

## 2022-12-11 DIAGNOSIS — F41.9 ANXIETY: ICD-10-CM

## 2022-12-12 RX ORDER — BUPROPION HYDROCHLORIDE 150 MG/1
TABLET ORAL
Qty: 90 TABLET | Refills: 1 | Status: SHIPPED | OUTPATIENT
Start: 2022-12-12

## 2022-12-21 ENCOUNTER — OFFICE VISIT (OUTPATIENT)
Dept: FAMILY MEDICINE CLINIC | Age: 57
End: 2022-12-21
Payer: COMMERCIAL

## 2022-12-21 VITALS
BODY MASS INDEX: 31.58 KG/M2 | WEIGHT: 185 LBS | HEIGHT: 64 IN | SYSTOLIC BLOOD PRESSURE: 122 MMHG | OXYGEN SATURATION: 97 % | DIASTOLIC BLOOD PRESSURE: 84 MMHG | HEART RATE: 90 BPM

## 2022-12-21 DIAGNOSIS — Z01.810 PREOP CARDIOVASCULAR EXAM: Primary | ICD-10-CM

## 2022-12-21 DIAGNOSIS — J34.3 HYPERTROPHY OF INFERIOR NASAL TURBINATE: ICD-10-CM

## 2022-12-21 DIAGNOSIS — L40.50 PSORIATIC ARTHRITIS (HCC): ICD-10-CM

## 2022-12-21 DIAGNOSIS — J45.20 MILD INTERMITTENT REACTIVE AIRWAY DISEASE WITHOUT COMPLICATION: ICD-10-CM

## 2022-12-21 DIAGNOSIS — J34.89 NASAL VALVE COLLAPSE: ICD-10-CM

## 2022-12-21 DIAGNOSIS — I10 HYPERTENSION, ESSENTIAL: ICD-10-CM

## 2022-12-21 DIAGNOSIS — J34.2 NASAL SEPTAL DEVIATION: ICD-10-CM

## 2022-12-21 PROBLEM — R73.9 HYPERGLYCEMIA: Status: RESOLVED | Noted: 2018-04-03 | Resolved: 2022-12-21

## 2022-12-21 PROCEDURE — 3078F DIAST BP <80 MM HG: CPT | Performed by: FAMILY MEDICINE

## 2022-12-21 PROCEDURE — 99212 OFFICE O/P EST SF 10 MIN: CPT | Performed by: FAMILY MEDICINE

## 2022-12-21 PROCEDURE — 3074F SYST BP LT 130 MM HG: CPT | Performed by: FAMILY MEDICINE

## 2022-12-21 PROCEDURE — 93000 ELECTROCARDIOGRAM COMPLETE: CPT | Performed by: FAMILY MEDICINE

## 2022-12-21 NOTE — PROGRESS NOTES
PRE-OP HISTORY AND PHYSICAL   Subjective:    Dipika Cabrera is a 62 y.o. female who presents to the office today for a preoperative consultation. Chief Complaint   Patient presents with    Pre-op Exam     To have rhinoplasty - 1/3/23, Dr. Margo Shabazz     This consultation is requested for the specific conditions prompting preoperative evaluation (i.e. because of potential affect on operative risk):   1. Preop cardiovascular exam    2. Nasal septal deviation    3. Nasal valve collapse    4. Hypertrophy of inferior nasal turbinate    5. Hypertension, essential    6. Mild intermittent reactive airway disease without complication      Planned anesthesia is General.  The patient has the following known anesthesia issues: none. Bleeding risk: no recent abnormal bleeding, no remote history of abnormal bleeding. Review of Systems   General ROS: fever? No,    Night sweats? No  Hematological and Lymphatic ROS: easy bruising? No  Respiratory ROS: cough? No   Wheezing? No  Cardiovascular ROS: chest pain? No   Shortness of breath?  No    Patient Active Problem List   Diagnosis    Hypercholesteremia due to HDL > 100    Low testosterone level in female    Elevated C-reactive protein (CRP)    Primary osteoarthritis of first carpometacarpal joint of left hand    Family history of Alzheimer's disease    Psoriasis    Acquired underactive thyroid    Reactive airway disease    Chronic allergic rhinitis    Eustachian catarrh, bilateral    Allergic sinusitis    Neuritis    Vasomotor rhinitis    Diverticulitis    Trigger middle finger of right hand    History of colonic diverticulitis    Trigger finger, acquired    Anxiety    Hypertension, essential    De Quervain's tenosynovitis, left    De Quervain's disease (radial styloid tenosynovitis)     Past Medical History:   Diagnosis Date    Abnormal Pap smear of cervix     Acquired underactive thyroid 04/20/2017    Asthma     when have colds goes into asthma    Chronic allergic rhinitis 04/03/2018    Diverticulitis 2006    colon 2006    Family history of Alzheimer's disease 11/15/2016    Fibrocystic breast     Hypercholesteremia due to HDL > 100 10/17/2013    no meds    Irregular uterine bleeding     Left hand pain 10/2022    Menopause ovarian failure     Primary osteoarthritis of first carpometacarpal joint of left hand 09/25/2015     Past Surgical History:   Procedure Laterality Date    BREAST BIOPSY      COLONOSCOPY      x2    COLPOSCOPY      Dr. Emerson Kenney liposuction-abdomen and thighs    EYE SURGERY      corrective    FINGER TRIGGER RELEASE Left 5/19/16    Middle Finger    FINGER TRIGGER RELEASE Right 1/20/2022    RIGHT INDEX FINGER, MIDDLE FINGER AND RING FINGER TRIGGER FINGER RELEASE performed by Enrique Espinal MD at [facility] Left 5/8/14    Middle Finger Ulnar Digital Nerve contusion and neuropraxia    SMALL INTESTINE SURGERY N/A 11/18/2020    SIGMOID COLECTOMY performed by Elva Shane MD at [address] [place] [address] Left 10/13/2022    LEFT FIRST DORSAL COMPARTMENT (DEQUERVAIN'S ) RELEASE performed by En[facility]inal MD at [facility] History   Problem Relation Age of Onset    Cancer Mother         uterine sarcoma, skin non-melanoma    Stroke Mother     Alzheimer's Disease Mother     Cancer Father         skin non-melanoma    Alzheimer's Disease Father      Social History     Tobacco Use    Smoking status: Never    Smokeless tobacco: Never    Tobacco comments:     congrats   Vaping Use    Vaping Use: Never used   Substance Use Topics    Alcohol use: Yes     Alcohol/week: 5.0 standard drinks     Types: 5 Standard drinks or equivalent per week     Comment: socially    Drug use: Never     Allergies   Allergen Reactions    Penicillins Hives    Other Itching     thermersol-preservative for contact solution-pt states got itchy eyes    Nickel Rash     Prior to Visit Medications    Medication Sig Taking?  Authorizing Provider   buPROPion (WELLBUTRIN XL) 150 MG extended release tablet TAKE ONE TABLET BY MOUTH EVERY MORNING Yes Narciso Mi MD   sulfaSALAzine (AZULFIDINE) 500 MG tablet Take 1 tablet twice a day for 2 weeks and then increase to 2 tablets twice a day Yes Beatriz Hauser MD   losartan (COZAAR) 50 MG tablet TAKE 1 TABLET BY MOUTH ONE TIME A DAY Yes NATALYA Levy CNP   hydroCHLOROthiazide (HYDRODIURIL) 25 MG tablet TAKE ONE TABLET BY MOUTH EVERY MORNING Yes Narciso Mi MD   liothyronine (CYTOMEL) 5 MCG tablet TAKE TWO TABLETS BY MOUTH EVERY DAY Yes Narciso Mi MD   progesterone (PROMETRIUM) 100 MG CAPS capsule TAKE ONE CAPSULE BY MOUTH NIGHTLY Yes Eusebia Samuel MD   estradiol (ESTRACE) 1 mg tablet TAKE 1 TABLET BY MOUTH ONE TIME A DAY Yes Eusebia Samuel MD   ipratropium (ATROVENT) 0.03 % nasal spray 2 sprays by Nasal route as needed (nasal drainage) Yes NATALYA Levy CNP   Multiple Vitamin (MULTIVITAMINS PO) Take 1 tablet by mouth daily  Yes Historical Provider, MD   Ascorbic Acid (VITAMIN C) 500 MG tablet Take 500 mg by mouth daily. Yes Historical Provider, MD   magnesium 30 MG tablet Take 400 mg by mouth daily  Yes Historical Provider, MD   zinc gluconate 50 MG tablet Take 50 mg by mouth daily. Yes Historical Provider, MD       HISTORY:  Patient's medications, allergies, past medical, surgical, social and family histories were reviewed and updated as appropriate (See above).      Objective:   PHYSICAL EXAM  /84 (Site: Left Upper Arm, Position: Sitting, Cuff Size: Medium Adult)   Pulse 90   Ht 5' 4\" (1.626 m)   Wt 185 lb (83.9 kg)   LMP 12/15/2015   SpO2 97%   BMI 31.76 kg/m²   BP Readings from Last 5 Encounters:   12/21/22 122/84   11/21/22 138/82   11/16/22 138/86   10/13/22 132/87   10/11/22 132/86     Wt Readings from Last 5 Encounters:   12/21/22 185 lb (83.9 kg)   11/21/22 189 lb (85.7 kg)   11/16/22 188 lb (85.3 kg)   10/21/22 188 lb (85.3 kg)   10/13/22 188 lb (85.3 kg)      GENERAL: well-developed, well-nourished, alert, no distress. EYES: glasses  External findings: lids and lashes normal and conjunctivae and sclerae normal  Eyes: no periorbital cellulitis. ENT:   External nose and ears appear normal  normal TM's and external ear canals both ears  Pharynx: normal. Exudates: None  Lips, mucosa, and tongue normal and teeth normal   Hearing grossly Normal.     NECK:   No adenopathy, supple, symmetrical, trachea midline  Thyroid not enlarged, symmetric, no tenderness/mass/nodules  LYMPH:  no cervical nodes, no supraclavicular nodes  LUNGS:    Breathing unlabored  clear to auscultation bilaterally and good air movement  CARDIOVASC:   regular rate and rhythm, S1, S2 normal. No murmur, click, rub or gallop  Apical impulse normal  LEGS:  Lower extremity edema: none    No carotid bruits  ABDOMEN:   Soft, non-tender, no masses  No hepatosplenomegaly  No hernias noted. Exam limited by N/A  SKIN: warm and dry  No rashes or suspicious lesions  PSYCH:    Alert and oriented  Normal reasoning, insight good  Facial expressions full, mood appropriate  No memory disturbance noted    Cardiographics  EKG: normal EKG, normal sinus rhythm, unchanged from previous tracings. No acute changes. Lab Review   Lab Results   Component Value Date     (L) 10/17/2022    K 4.6 10/17/2022    CREATININE 0.8 11/21/2022     Lab Results   Component Value Date    WBC 6.0 11/21/2022    HGB 12.1 11/21/2022    HCT 35.3 (L) 11/21/2022    MCV 86.8 11/21/2022     11/21/2022     Lab Results   Component Value Date    LABA1C 5.4 06/30/2022      Assessment:    62 y.o. female with planned surgery as above. Diagnosis Orders   1. Preop cardiovascular exam  EKG 12 Lead      2. Nasal septal deviation        3. Nasal valve collapse        4. Hypertrophy of inferior nasal turbinate        5. Hypertension, essential  Good control      6.  Mild intermittent reactive airway disease without complication  Not needed rescue inhaler in over 2 years. Plan:   Ok to proceed with the surgery. Low cardiac risk. Pt. advised to stop all Rx except anti-hypertensives the a.m. of surgery. Patient advised to hold blood thinning medication 7 days prior to procedure. Prophylaxis for cardiac events with perioperative beta-blockers: not indicated. Yaron Nguyen MD    INSTRUCTIONS  Hold sulfasalazine 1 week before surgery. Night before surgery take losartan per routine. AM of surgery take the following with a sip of water: hydrochlorothiazide  Get blood work next week as planned.

## 2022-12-21 NOTE — PATIENT INSTRUCTIONS
INSTRUCTIONS  Hold sulfasalazine 1 week before surgery. Night before surgery take losartan per routine. AM of surgery take the following with a sip of water: hydrochlorothiazide  Get blood work next week as planned.

## 2022-12-22 NOTE — PROGRESS NOTES
Covid testing to be done: If positive---Pt instructed to notify MD ASAP  If negative--pt was instructed to bring Hard copy of Covid results DOP/DOS    Preoperative Screening for Elective Surgery/Invasive Procedures While DOS. C-Difficile admission screening and protocol:       * Admitted with diarrhea? [] YES    [x]  NO     *Prior history of C-Diff. In last 3 months? [] YES    [x]  NO     *Antibiotic use in the past 6-8 weeks? [x]  NO    []  YES      If yes, which: REASON_________________     *Prior hospitalization or nursing home in the last month? []  YES    [x]  NO     SAFETY FIRST. .call before you fall    [facility] [facility] time__0700__________        Surgery time_____0855_______    Do not eat or drink anything after 12:00 midnight prior to your surgery. This includes water chewing gum, mints and ice chips- the Day of Surgery. You may brush your teeth and gargle the morning of your surgery, but do not swallow the water     Please see your family doctor/pediatrician for a history and physical and/or questions concerning medications. Bring any test results/reports from your physicians office. If you are under the care of a heart doctor or specialist doctor, please be aware that you may be asked to them for clearance    You may be asked to stop blood thinners such as Coumadin, Plavix, Fragmin, Lovenox, etc., or any anti-inflammatories such as:  Aspirin, Ibuprofen, Advil, Naproxen prior to your surgery. We also ask that you stop any OTC medications such as fish oil, vitamin E, glucosamine, garlic, Multivitamins, COQ 10, etc.    We ask that you do not smoke 24 hours prior to surgery  We ask that you do not  drink any alcoholic beverages 24 hours prior to surgery     You must make arrangements for a responsible adult to take you home after your surgery. For your safety you will not be allowed to leave alone or drive yourself home.   Your surgery will be cancelled if you do not have a ride home. Also for your safety, it is strongly suggested that someone stay with you the first 24 hours after your surgery. A parent or legal guardian must accompany a child scheduled for surgery and plan to stay at the hospital until the child is discharged. Please do not bring other children with you. For your comfort, please wear simple loose fitting clothing to the hospital.  Please do not bring valuables. Do not wear any make-up or nail polish on your fingers or toes. For your safety, please do not wear any jewelry or body piercing's on the day of surgery. All jewelry must be removed. If you have dentures, they will be removed before going to operating room. For your convenience, we will provide you with a container. If you wear contact lenses or glasses, they will be removed, please bring a case for them. If you have a living will and a durable power of  for healthcare, please bring in a copy. As part of our patient safety program to minimize surgical site infections, we ask you to do the following:    Please notify your surgeon if you develop any illness between         now and the day of your surgery. This includes a cough, cold, fever, sore throat, nausea,         or vomiting, and diarrhea, etc.   Please notify your surgeon if you experience dizziness, shortness         of breath or blurred vision between now and the time of your surgery. Do not shave your operative site 96 hours prior to surgery. For face and neck surgery, men may use an electric razor 48 hours   prior to surgery. You may shower the night before surgery or the morning of   your surgery with an antibacterial soap.     You will need to bring a photo ID and insurance card     If you use a C-pap or Bi-pap machine, please bring your machine with you to the hospital     Our goal is to provide you with excellent care, therefore, visitors will be limited to so that we may focus on providing this care for you. Please contact your surgeon office, if you have any further questions. [facility] phone number:  [address] PAT fax number:  [phone]    Please note these are generalized instructions for all surgical cases, you may be provided with more specific instructions according to your surgery.

## 2022-12-27 DIAGNOSIS — Z79.899 HIGH RISK MEDICATION USE: ICD-10-CM

## 2022-12-27 DIAGNOSIS — I10 HYPERTENSION, ESSENTIAL: ICD-10-CM

## 2022-12-27 LAB
ALT SERPL-CCNC: 7 U/L (ref 10–40)
ANION GAP SERPL CALCULATED.3IONS-SCNC: 14 MMOL/L (ref 3–16)
AST SERPL-CCNC: 19 U/L (ref 15–37)
BASOPHILS ABSOLUTE: 0 K/UL (ref 0–0.2)
BASOPHILS RELATIVE PERCENT: 0.9 %
BUN BLDV-MCNC: 12 MG/DL (ref 7–20)
CALCIUM SERPL-MCNC: 9.4 MG/DL (ref 8.3–10.6)
CHLORIDE BLD-SCNC: 97 MMOL/L (ref 99–110)
CO2: 26 MMOL/L (ref 21–32)
CREAT SERPL-MCNC: 0.7 MG/DL (ref 0.6–1.1)
EOSINOPHILS ABSOLUTE: 0.1 K/UL (ref 0–0.6)
EOSINOPHILS RELATIVE PERCENT: 1.5 %
GFR SERPL CREATININE-BSD FRML MDRD: >60 ML/MIN/{1.73_M2}
GLUCOSE BLD-MCNC: 99 MG/DL (ref 70–99)
HCT VFR BLD CALC: 36.9 % (ref 36–48)
HEMOGLOBIN: 12.5 G/DL (ref 12–16)
LYMPHOCYTES ABSOLUTE: 1.6 K/UL (ref 1–5.1)
LYMPHOCYTES RELATIVE PERCENT: 30.7 %
MCH RBC QN AUTO: 29.4 PG (ref 26–34)
MCHC RBC AUTO-ENTMCNC: 33.7 G/DL (ref 31–36)
MCV RBC AUTO: 87.1 FL (ref 80–100)
MONOCYTES ABSOLUTE: 0.4 K/UL (ref 0–1.3)
MONOCYTES RELATIVE PERCENT: 8.4 %
NEUTROPHILS ABSOLUTE: 3 K/UL (ref 1.7–7.7)
NEUTROPHILS RELATIVE PERCENT: 58.5 %
PDW BLD-RTO: 13.4 % (ref 12.4–15.4)
PLATELET # BLD: 303 K/UL (ref 135–450)
PMV BLD AUTO: 7.7 FL (ref 5–10.5)
POTASSIUM SERPL-SCNC: 3.9 MMOL/L (ref 3.5–5.1)
RBC # BLD: 4.24 M/UL (ref 4–5.2)
SODIUM BLD-SCNC: 137 MMOL/L (ref 136–145)
WBC # BLD: 5.1 K/UL (ref 4–11)

## 2022-12-29 ENCOUNTER — ANESTHESIA EVENT (OUTPATIENT)
Dept: OPERATING ROOM | Age: 57
End: 2022-12-29
Payer: COMMERCIAL

## 2023-01-03 ENCOUNTER — ANESTHESIA (OUTPATIENT)
Dept: OPERATING ROOM | Age: 58
End: 2023-01-03
Payer: COMMERCIAL

## 2023-01-03 ENCOUNTER — HOSPITAL ENCOUNTER (OUTPATIENT)
Age: 58
Setting detail: OUTPATIENT SURGERY
Discharge: HOME | End: 2023-01-03
Attending: OTOLARYNGOLOGY | Admitting: OTOLARYNGOLOGY
Payer: COMMERCIAL

## 2023-01-03 VITALS
RESPIRATION RATE: 15 BRPM | SYSTOLIC BLOOD PRESSURE: 135 MMHG | BODY MASS INDEX: 32.26 KG/M2 | DIASTOLIC BLOOD PRESSURE: 89 MMHG | TEMPERATURE: 97.5 F | WEIGHT: 188.93 LBS | OXYGEN SATURATION: 94 % | HEIGHT: 64 IN | HEART RATE: 89 BPM

## 2023-01-03 DIAGNOSIS — R09.81 NASAL CONGESTION: Primary | ICD-10-CM

## 2023-01-03 PROCEDURE — 3700000000 HC ANESTHESIA ATTENDED CARE: Performed by: OTOLARYNGOLOGY

## 2023-01-03 PROCEDURE — 30520 REPAIR OF NASAL SEPTUM: CPT | Performed by: OTOLARYNGOLOGY

## 2023-01-03 PROCEDURE — 2580000003 HC RX 258: Performed by: ANESTHESIOLOGY

## 2023-01-03 PROCEDURE — 30465 REPAIR NASAL STENOSIS: CPT | Performed by: OTOLARYNGOLOGY

## 2023-01-03 PROCEDURE — 3600000005 HC SURGERY LEVEL 5 BASE: Performed by: OTOLARYNGOLOGY

## 2023-01-03 PROCEDURE — 2709999900 HC NON-CHARGEABLE SUPPLY: Performed by: OTOLARYNGOLOGY

## 2023-01-03 PROCEDURE — 30140 RESECT INFERIOR TURBINATE: CPT | Performed by: OTOLARYNGOLOGY

## 2023-01-03 PROCEDURE — 6370000000 HC RX 637 (ALT 250 FOR IP): Performed by: OTOLARYNGOLOGY

## 2023-01-03 PROCEDURE — 7100000011 HC PHASE II RECOVERY - ADDTL 15 MIN: Performed by: OTOLARYNGOLOGY

## 2023-01-03 PROCEDURE — 2720000010 HC SURG SUPPLY STERILE: Performed by: OTOLARYNGOLOGY

## 2023-01-03 PROCEDURE — 3600000015 HC SURGERY LEVEL 5 ADDTL 15MIN: Performed by: OTOLARYNGOLOGY

## 2023-01-03 PROCEDURE — 7100000000 HC PACU RECOVERY - FIRST 15 MIN: Performed by: OTOLARYNGOLOGY

## 2023-01-03 PROCEDURE — 7100000010 HC PHASE II RECOVERY - FIRST 15 MIN: Performed by: OTOLARYNGOLOGY

## 2023-01-03 PROCEDURE — A4217 STERILE WATER/SALINE, 500 ML: HCPCS | Performed by: OTOLARYNGOLOGY

## 2023-01-03 PROCEDURE — 3700000001 HC ADD 15 MINUTES (ANESTHESIA): Performed by: OTOLARYNGOLOGY

## 2023-01-03 PROCEDURE — 2500000003 HC RX 250 WO HCPCS: Performed by: OTOLARYNGOLOGY

## 2023-01-03 PROCEDURE — 2580000003 HC RX 258: Performed by: OTOLARYNGOLOGY

## 2023-01-03 PROCEDURE — 6360000002 HC RX W HCPCS: Performed by: NURSE ANESTHETIST, CERTIFIED REGISTERED

## 2023-01-03 PROCEDURE — 7100000001 HC PACU RECOVERY - ADDTL 15 MIN: Performed by: OTOLARYNGOLOGY

## 2023-01-03 PROCEDURE — 2500000003 HC RX 250 WO HCPCS: Performed by: NURSE ANESTHETIST, CERTIFIED REGISTERED

## 2023-01-03 RX ORDER — FENTANYL CITRATE 50 UG/ML
50 INJECTION, SOLUTION INTRAMUSCULAR; INTRAVENOUS EVERY 5 MIN PRN
Status: DISCONTINUED | OUTPATIENT
Start: 2023-01-03 | End: 2023-01-03 | Stop reason: HOSPADM

## 2023-01-03 RX ORDER — SODIUM CHLORIDE 9 MG/ML
INJECTION, SOLUTION INTRAVENOUS PRN
Status: DISCONTINUED | OUTPATIENT
Start: 2023-01-03 | End: 2023-01-03 | Stop reason: HOSPADM

## 2023-01-03 RX ORDER — ONDANSETRON 2 MG/ML
INJECTION INTRAMUSCULAR; INTRAVENOUS PRN
Status: DISCONTINUED | OUTPATIENT
Start: 2023-01-03 | End: 2023-01-03 | Stop reason: SDUPTHER

## 2023-01-03 RX ORDER — CLINDAMYCIN HYDROCHLORIDE 300 MG/1
300 CAPSULE ORAL 3 TIMES DAILY
Qty: 21 CAPSULE | Refills: 0 | Status: SHIPPED | OUTPATIENT
Start: 2023-01-03 | End: 2023-01-10

## 2023-01-03 RX ORDER — SODIUM CHLORIDE 0.9 % (FLUSH) 0.9 %
5-40 SYRINGE (ML) INJECTION PRN
Status: DISCONTINUED | OUTPATIENT
Start: 2023-01-03 | End: 2023-01-03 | Stop reason: HOSPADM

## 2023-01-03 RX ORDER — MAGNESIUM HYDROXIDE 1200 MG/15ML
LIQUID ORAL CONTINUOUS PRN
Status: DISCONTINUED | OUTPATIENT
Start: 2023-01-03 | End: 2023-01-03 | Stop reason: HOSPADM

## 2023-01-03 RX ORDER — ONDANSETRON 4 MG/1
4 TABLET, FILM COATED ORAL DAILY PRN
Qty: 30 TABLET | Refills: 0 | Status: SHIPPED | OUTPATIENT
Start: 2023-01-03

## 2023-01-03 RX ORDER — PROPOFOL 10 MG/ML
INJECTION, EMULSION INTRAVENOUS PRN
Status: DISCONTINUED | OUTPATIENT
Start: 2023-01-03 | End: 2023-01-03 | Stop reason: SDUPTHER

## 2023-01-03 RX ORDER — MEPERIDINE HYDROCHLORIDE 25 MG/ML
12.5 INJECTION INTRAMUSCULAR; INTRAVENOUS; SUBCUTANEOUS EVERY 5 MIN PRN
Status: DISCONTINUED | OUTPATIENT
Start: 2023-01-03 | End: 2023-01-03 | Stop reason: HOSPADM

## 2023-01-03 RX ORDER — ROCURONIUM BROMIDE 10 MG/ML
INJECTION, SOLUTION INTRAVENOUS PRN
Status: DISCONTINUED | OUTPATIENT
Start: 2023-01-03 | End: 2023-01-03 | Stop reason: SDUPTHER

## 2023-01-03 RX ORDER — OXYMETAZOLINE HYDROCHLORIDE 0.05 G/100ML
SPRAY NASAL
Status: COMPLETED | OUTPATIENT
Start: 2023-01-03 | End: 2023-01-03

## 2023-01-03 RX ORDER — ONDANSETRON 2 MG/ML
4 INJECTION INTRAMUSCULAR; INTRAVENOUS
Status: DISCONTINUED | OUTPATIENT
Start: 2023-01-03 | End: 2023-01-03 | Stop reason: HOSPADM

## 2023-01-03 RX ORDER — SODIUM CHLORIDE 0.9 % (FLUSH) 0.9 %
5-40 SYRINGE (ML) INJECTION EVERY 12 HOURS SCHEDULED
Status: DISCONTINUED | OUTPATIENT
Start: 2023-01-03 | End: 2023-01-03 | Stop reason: HOSPADM

## 2023-01-03 RX ORDER — MIDAZOLAM HYDROCHLORIDE 1 MG/ML
INJECTION INTRAMUSCULAR; INTRAVENOUS PRN
Status: DISCONTINUED | OUTPATIENT
Start: 2023-01-03 | End: 2023-01-03 | Stop reason: SDUPTHER

## 2023-01-03 RX ORDER — LIDOCAINE HYDROCHLORIDE 20 MG/ML
INJECTION, SOLUTION EPIDURAL; INFILTRATION; INTRACAUDAL; PERINEURAL PRN
Status: DISCONTINUED | OUTPATIENT
Start: 2023-01-03 | End: 2023-01-03 | Stop reason: SDUPTHER

## 2023-01-03 RX ORDER — DEXAMETHASONE SODIUM PHOSPHATE 4 MG/ML
INJECTION, SOLUTION INTRA-ARTICULAR; INTRALESIONAL; INTRAMUSCULAR; INTRAVENOUS; SOFT TISSUE PRN
Status: DISCONTINUED | OUTPATIENT
Start: 2023-01-03 | End: 2023-01-03 | Stop reason: SDUPTHER

## 2023-01-03 RX ORDER — FENTANYL CITRATE 50 UG/ML
INJECTION, SOLUTION INTRAMUSCULAR; INTRAVENOUS PRN
Status: DISCONTINUED | OUTPATIENT
Start: 2023-01-03 | End: 2023-01-03 | Stop reason: SDUPTHER

## 2023-01-03 RX ORDER — PHENYLEPHRINE HCL IN 0.9% NACL 1 MG/10 ML
SYRINGE (ML) INTRAVENOUS PRN
Status: DISCONTINUED | OUTPATIENT
Start: 2023-01-03 | End: 2023-01-03 | Stop reason: SDUPTHER

## 2023-01-03 RX ORDER — LIDOCAINE HYDROCHLORIDE AND EPINEPHRINE 10; 10 MG/ML; UG/ML
INJECTION, SOLUTION INFILTRATION; PERINEURAL
Status: COMPLETED | OUTPATIENT
Start: 2023-01-03 | End: 2023-01-03

## 2023-01-03 RX ORDER — ECHINACEA PURPUREA EXTRACT 125 MG
1 TABLET ORAL 4 TIMES DAILY
Qty: 1 EACH | Refills: 3 | Status: SHIPPED | OUTPATIENT
Start: 2023-01-03

## 2023-01-03 RX ORDER — FENTANYL CITRATE 50 UG/ML
25 INJECTION, SOLUTION INTRAMUSCULAR; INTRAVENOUS EVERY 5 MIN PRN
Status: DISCONTINUED | OUTPATIENT
Start: 2023-01-03 | End: 2023-01-03 | Stop reason: HOSPADM

## 2023-01-03 RX ORDER — PROMETHAZINE HYDROCHLORIDE 12.5 MG/1
12.5 TABLET ORAL 3 TIMES DAILY PRN
Qty: 12 TABLET | Refills: 0 | Status: SHIPPED | OUTPATIENT
Start: 2023-01-03 | End: 2023-01-10

## 2023-01-03 RX ORDER — HYDROCODONE BITARTRATE AND ACETAMINOPHEN 5; 325 MG/1; MG/1
1 TABLET ORAL EVERY 6 HOURS PRN
Qty: 20 TABLET | Refills: 0 | Status: SHIPPED | OUTPATIENT
Start: 2023-01-03 | End: 2023-01-08

## 2023-01-03 RX ADMIN — Medication 150 MCG: at 08:45

## 2023-01-03 RX ADMIN — Medication 100 MCG: at 09:15

## 2023-01-03 RX ADMIN — ROCURONIUM BROMIDE 50 MG: 10 INJECTION INTRAVENOUS at 08:05

## 2023-01-03 RX ADMIN — DEXAMETHASONE SODIUM PHOSPHATE 10 MG: 4 INJECTION, SOLUTION INTRAMUSCULAR; INTRAVENOUS at 08:05

## 2023-01-03 RX ADMIN — LIDOCAINE HYDROCHLORIDE 100 MG: 20 INJECTION, SOLUTION EPIDURAL; INFILTRATION; INTRACAUDAL; PERINEURAL at 08:05

## 2023-01-03 RX ADMIN — FENTANYL CITRATE 50 MCG: 50 INJECTION INTRAMUSCULAR; INTRAVENOUS at 09:44

## 2023-01-03 RX ADMIN — PROPOFOL 200 MG: 10 INJECTION, EMULSION INTRAVENOUS at 08:05

## 2023-01-03 RX ADMIN — Medication 150 MCG: at 08:25

## 2023-01-03 RX ADMIN — SODIUM CHLORIDE: 9 INJECTION, SOLUTION INTRAVENOUS at 09:45

## 2023-01-03 RX ADMIN — MIDAZOLAM 2 MG: 1 INJECTION INTRAMUSCULAR; INTRAVENOUS at 08:00

## 2023-01-03 RX ADMIN — FENTANYL CITRATE 50 MCG: 50 INJECTION INTRAMUSCULAR; INTRAVENOUS at 08:05

## 2023-01-03 RX ADMIN — SUGAMMADEX 200 MG: 100 INJECTION, SOLUTION INTRAVENOUS at 09:41

## 2023-01-03 RX ADMIN — SODIUM CHLORIDE: 9 INJECTION, SOLUTION INTRAVENOUS at 07:54

## 2023-01-03 RX ADMIN — Medication 150 MCG: at 08:30

## 2023-01-03 RX ADMIN — ONDANSETRON 4 MG: 2 INJECTION INTRAMUSCULAR; INTRAVENOUS at 09:43

## 2023-01-03 ASSESSMENT — PAIN - FUNCTIONAL ASSESSMENT: PAIN_FUNCTIONAL_ASSESSMENT: 0-10

## 2023-01-03 NOTE — PROGRESS NOTES
Pt to phase 2 in stable condition. Pt denies pain or nausea. Denies sob, pt encouraged to breathe through mouth and pulse up to 96%. No bleeding noted in back of throat. Pt able to take ice chips. Pt requested phenergan vs zofran - md aware and rx changed. Dressing dry and intact along with splints - no nasal bleeding noted.

## 2023-01-03 NOTE — PROGRESS NOTES
Zhannaad to phase II. A/Ox4. VSS. On rom air. Small drainage from surgical site.  [address] Spence safely ambulates and transfers

## 2023-01-03 NOTE — ANESTHESIA PRE PROCEDURE
[facility] Department of Anesthesiology  Pre-Anesthesia Evaluation/Consultation       Name:  Edin Gomez  : 1965  Age:  62 y.o.                                            MRN:  [id number]  Date: 1/3/2023           Surgeon: Surgeon(s):  Danny Trejo MD    Procedure: Procedure(s):  BILATERAL NASAL VALVE REPAIR, SEPTOPLASTY  INFERIOR TURBINATE REDUCTION     Allergies   Allergen Reactions    Penicillins Hives    Other Itching     thermersol-preservative for contact solution-pt states got itchy eyes    Nickel Rash     Patient Active Problem List   Diagnosis    Hypercholesteremia due to HDL > 100    Low testosterone level in female    Elevated C-reactive protein (CRP)    Primary osteoarthritis of first carpometacarpal joint of left hand    Family history of Alzheimer's disease    Psoriasis    Acquired underactive thyroid    Reactive airway disease    Chronic allergic rhinitis    Eustachian catarrh, bilateral    Allergic sinusitis    Neuritis    Vasomotor rhinitis    Diverticulitis    Trigger middle finger of right hand    History of colonic diverticulitis    Trigger finger, acquired    Anxiety    Hypertension, essential    De Quervain's tenosynovitis, left    De Quervain's disease (radial styloid tenosynovitis)    Psoriatic arthritis ([address]     Past Medical History:   Diagnosis Date    Abnormal Pap smear of cervix     Acquired underactive thyroid 2017    Asthma     when have colds goes into asthma    Chronic allergic rhinitis 2018    Diverticulitis 2006    colon 2006    Family history of Alzheimer's disease 11/15/2016    Fibrocystic breast     Hypercholesteremia due to HDL > 100 10/17/2013    no meds    Irregular uterine bleeding     Left hand pain 10/2022    Menopause ovarian failure     Primary osteoarthritis of first carpometacarpal joint of left hand 2015    Psoriatic arthritis ([address] 2022     Past Surgical History:   Procedure Laterality Date  BREAST BIOPSY      COLONOSCOPY      x2   Yvette Taj Ruelas liposuction-abdomen and thighs    EYE SURGERY      corrective    FINGER TRIGGER RELEASE Left 5/19/16    Middle Finger    FINGER TRIGGER RELEASE Right 1/20/2022    RIGHT INDEX FINGER, MIDDLE FINGER AND RING FINGER TRIGGER FINGER RELEASE performed by Cecile Fairbanks MD at [address] HAND SURGERY Left 5/8/14    Middle Finger Ulnar Digital Nerve contusion and neuropraxia    SMALL INTESTINE SURGERY N/A 11/18/2020    SIGMOID COLECTOMY performed by Andreea Coats MD at [address] Left 10/13/2022    LEFT FIRST DORSAL COMPARTMENT (DEQUERVAIN'S ) RELEASE performed by Cecile Fairbanks MD at . [facility] History     Tobacco Use    Smoking status: Never    Smokeless tobacco: Never    Tobacco comments:     congrats   Vaping Use    Vaping Use: Never used   Substance Use Topics    Alcohol use:  Yes     Alcohol/week: 5.0 standard drinks     Types: 5 Standard drinks or equivalent per week     Comment: socially    Drug use: Never     Medications  Current Facility-Administered Medications on File Prior to Visit   Medication Dose Route Frequency Provider Last Rate Last Admin    sodium chloride flush 0.9 % injection 5-40 mL  5-40 mL IntraVENous 2 times per day Juli Flores MD        sodium chloride flush 0.9 % injection 5-40 mL  5-40 mL IntraVENous PRN Juli Flores MD        0.9 % sodium chloride infusion   IntraVENous PRN Juli Flores MD         Current Outpatient Medications on File Prior to Visit   Medication Sig Dispense Refill    buPROPion (WELLBUTRIN XL) 150 MG extended release tablet TAKE ONE TABLET BY MOUTH EVERY MORNING 90 tablet 1    sulfaSALAzine (AZULFIDINE) 500 MG tablet Take 1 tablet twice a day for 2 weeks and then increase to 2 tablets twice a day 120 tablet 2    losartan (COZAAR) 50 MG tablet TAKE 1 TABLET BY MOUTH ONE TIME A DAY 30 tablet 2    hydroCHLOROthiazide (HYDRODIURIL) 25 MG tablet TAKE ONE TABLET BY MOUTH EVERY MORNING 90 tablet 0    liothyronine (CYTOMEL) 5 MCG tablet TAKE TWO TABLETS BY MOUTH EVERY  tablet 3    progesterone (PROMETRIUM) 100 MG CAPS capsule TAKE ONE CAPSULE BY MOUTH NIGHTLY 90 capsule 3    estradiol (ESTRACE) 1 mg tablet TAKE 1 TABLET BY MOUTH ONE TIME A DAY 90 tablet 3    ipratropium (ATROVENT) 0.03 % nasal spray 2 sprays by Nasal route as needed (nasal drainage) 30 mL 5    Multiple Vitamin (MULTIVITAMINS PO) Take 1 tablet by mouth daily  (Patient not taking: Reported on 12/22/2022)      Ascorbic Acid (VITAMIN C) 500 MG tablet Take 500 mg by mouth daily.  magnesium 30 MG tablet Take 400 mg by mouth daily       zinc gluconate 50 MG tablet Take 50 mg by mouth daily. No current facility-administered medications for this visit. No current outpatient medications on file. Facility-Administered Medications Ordered in Other Visits   Medication Dose Route Frequency Provider Last Rate Last Admin    sodium chloride flush 0.9 % injection 5-40 mL  5-40 mL IntraVENous 2 times per day Amairani Rivas MD        sodium chloride flush 0.9 % injection 5-40 mL  5-40 mL IntraVENous PRN Amairani Rivas MD        0.9 % sodium chloride infusion   IntraVENous PRN Amairani Rivas MD         Vital Signs (Current)   There were no vitals filed for this visit. Vital Signs Statistics (for past 48 hrs)     No data recorded    BP Readings from Last 3 Encounters:   12/21/22 122/84   11/21/22 138/82   11/16/22 138/86     BMI  There is no height or weight on file to calculate BMI. Estimated body mass index is 31.41 kg/m² as calculated from the following:    Height as of 12/22/22: 5' 4\" (1.626 m). Weight as of 12/22/22: 183 lb (83 kg).     CBC   Lab Results   Component Value Date/Time    WBC 5.1 12/27/2022 12:52 PM    RBC 4.24 12/27/2022 12:52 PM    HGB 12.5 12/27/2022 12:52 PM    HCT 36.9 12/27/2022 12:52 PM    MCV 87.1 12/27/2022 12:52 PM    RDW 13.4 12/27/2022 12:52 PM     12/27/2022 12:52 PM     CMP    Lab Results   Component Value Date/Time     12/27/2022 12:52 PM    K 3.9 12/27/2022 12:52 PM    K 4.0 05/17/2022 07:49 AM    CL 97 12/27/2022 12:52 PM    CO2 26 12/27/2022 12:52 PM    BUN 12 12/27/2022 12:52 PM    CREATININE 0.7 12/27/2022 12:52 PM    GFRAA >60 06/30/2022 07:33 AM    GFRAA >60 03/22/2012 06:21 PM    AGRATIO 1.7 10/17/2022 11:08 AM    LABGLOM >60 12/27/2022 12:52 PM    GLUCOSE 99 12/27/2022 12:52 PM    PROT 6.7 10/17/2022 11:08 AM    PROT 6.8 03/22/2012 06:21 PM    CALCIUM 9.4 12/27/2022 12:52 PM    BILITOT <0.2 10/17/2022 11:08 AM    ALKPHOS 87 10/17/2022 11:08 AM    AST 19 12/27/2022 12:52 PM    ALT 7 12/27/2022 12:52 PM     BMP    Lab Results   Component Value Date/Time     12/27/2022 12:52 PM    K 3.9 12/27/2022 12:52 PM    K 4.0 05/17/2022 07:49 AM    CL 97 12/27/2022 12:52 PM    CO2 26 12/27/2022 12:52 PM    BUN 12 12/27/2022 12:52 PM    CREATININE 0.7 12/27/2022 12:52 PM    CALCIUM 9.4 12/27/2022 12:52 PM    GFRAA >60 06/30/2022 07:33 AM    GFRAA >60 03/22/2012 06:21 PM    LABGLOM >60 12/27/2022 12:52 PM    GLUCOSE 99 12/27/2022 12:52 PM     POCGlucose  No results for input(s): GLUCOSE in the last 72 hours.    Coags    Lab Results   Component Value Date/Time    PROTIME 10.4 05/17/2022 07:49 AM    INR 0.92 05/17/2022 07:49 AM    APTT 26.2 10/04/2020 06:36 AM     HCG (If Applicable)   Lab Results   Component Value Date    PREGTESTUR Negative 05/19/2016      ABGs No results found for: PHART, PO2ART, GWB2NWJ, AVT1APO, BEART, N6CCDJXY   Type & Screen (If Applicable)  No results found for: LABABO, LABRH                         BMI: Wt Readings from Last 3 Encounters:       NPO Status:                          Anesthesia Evaluation  Patient summary reviewed no history of anesthetic complications:   Airway: Mallampati: III  TM distance: >3 FB   Neck ROM: full  Mouth opening: > = 3 FB   Dental: normal exam Pulmonary:normal exam    (+) asthma:                            Cardiovascular:  Exercise tolerance: good (>4 METS),   (+) hypertension:, hyperlipidemia      ECG reviewed  Rhythm: regular  Rate: normal           Beta Blocker:  Not on Beta Blocker         Neuro/Psych:   (+) psychiatric history:depression/anxiety             GI/Hepatic/Renal:        (-) GERD       Endo/Other:    (+) hypothyroidism: arthritis (psoriatic arthritis):., .    (-) blood dyscrasia               Abdominal:   (+) obese,           Vascular: negative vascular ROS. Other Findings:             Anesthesia Plan      general     ASA 3       Induction: intravenous. MIPS: Postoperative opioids intended and Prophylactic antiemetics administered. Anesthetic plan and risks discussed with patient and spouse. Plan discussed with CRNA. This pre-anesthesia assessment may be used as a history and physical.    DOS STAFF ADDENDUM:    Pt seen and examined, chart reviewed (including anesthesia, drug and allergy history). No interval changes to history and physical examination. Anesthetic plan, risks, benefits, alternatives, and personnel involved discussed with patient. Questions and concerns addressed. Patient(family) verbalized an understanding and agrees to proceed.       Constantino Salas MD  January 3, 2023  7:36 AM

## 2023-01-03 NOTE — ANESTHESIA POSTPROCEDURE EVALUATION
Department of Anesthesiology  Postprocedure Note    Patient: Omaira Hayes  MRN: [id number]  YOB: 1965  Date of evaluation: 1/3/2023      Procedure Summary     Date: 01/03/23 Room / Location: [address] [place] [address]    Anesthesia Start: 0800 Anesthesia Stop: 0955    Procedures:       BILATERAL NASAL VALVE REPAIR, SEPTOPLASTY (Bilateral: Nose)      INFERIOR TURBINATE REDUCTION (Nose) Diagnosis:       Nasal congestion      Deviated septum      Nasal valve collapse      Hypertrophy of inferior nasal turbinate      (NASAL CONGESTION, DEVIATED SEPTUM, NASAL VALVE COLLAPSE, HYPERTROPHY OF INFERIOR NASAL TURBINATE)    Surgeons: Kandy Mathis MD Responsible Provider: Alice Argueta MD    Anesthesia Type: general ASA Status: 3          Anesthesia Type: No value filed.     Natalia Phase I: Natalia Score: 9    Natalia Phase II: Natalia Score: 10      Anesthesia Post Evaluation    Patient location during evaluation: PACU  Patient participation: complete - patient participated  Level of consciousness: awake  Pain score: 0  Airway patency: patent  Nausea & Vomiting: no nausea and no vomiting  Complications: no  Cardiovascular status: blood pressure returned to baseline  Respiratory status: acceptable  Hydration status: euvolemic

## 2023-01-03 NOTE — DISCHARGE INSTRUCTIONS
Discharge Instructions    Steps to Take  Home Care   While your nose are healing:   Do not blow your nose   You may have bloody discharge from your nose. Create a drip pad using rolls of gauze. Hold the roll under your nose to soak up any discharge. Avoid air pollutants like dust and smoke. Sleeping with the head elevated with pillows or in a recliner for 2 nights will help with swelling, but black eyes are expected  You may take a bath, but do not submerge or scrub nose  Physical Activity   During your recovery:   Light activity (no lifting more than 15 pounds) for 1 week  Medications   Dr. Patricia Mcgarry   Take antibiotic while splints are in place  Use nasal saline 4 times a day   Pain medication and nausea medication as needed    Follow these general medication guidelines:   Take your medication as directed. Do not change the amount or schedule. Do not share your medication with anyone. Medications can be dangerous when mixed. Talk to your doctor if you are taking more than one medication, including over-the-counter products and supplements. If you had to stop medications before surgery, ask your doctor when you can start again. Follow-up  Tomorrow and then  Monday  Call Your Doctor If Any of the Following Occur ([phone])  Contact your doctor if your recovery is not progressing as expected or you develop complications, such as:  Signs of infection, including fever and chills  Pain that you cannot control with the medications that you have been given  Redness, swelling, increasing pain, excessive bleeding, or discharge from the nose  Lightheadedness  Nausea and vomiting  Vomiting blood or material that looks like coffee grounds  Bruising around the eye(s)  Swelling of the eye(s)  Changes in vision  A headache that lasts longer than 2 days after surgery    If you think you have an emergency, call for medical help right away.

## 2023-01-03 NOTE — H&P
I have reviewed this patient's history and physical.  There have been no significant changes. The patient understands the risk of a septoplasty, bilateral nasal valve repair and inferior turbinate reduction including ongoing nasal congestion, bleeding, cosmetic changes to the nose and septal perforation. She agrees to proceed.

## 2023-01-03 NOTE — OP NOTE
[address] [facility] SURGERY  OPERATIVE REPORT    Patient Name: Vinny Wright  YOB: 1965  Medical Record Number:  [id number]  Billing Number:  [id number]  Date of Procedure: [unfilled]  Time: 0425    Pre Operative Diagnoses:   Nasal congestion  Deviated septum  Inferior turbinate hypertrophy     Post Operative Diagnoses:    same             Procedure:  bilateral nasal valve repair (18637)  Septoplasty (12453)   Bilateral inferior turbinate reduction (67614-59)       Surgeon: Gregg Yee MD    OR Staff/ Assistant:  Circulator: Kitty Pugh RN  Surgical Assistant: Jairon Ruth; Ifeanyi Badillo Circulator: Lu Johnson RN  Relief Scrub: David Asif  Scrub Person First: Gianni Castanon  Scrub Person Second: Cuca Montes  Circulator Assist: Ines Head RN    Anesthesia:  General anesthesia. Findings:  1) rightward anterior septal deviation extending to the dorsal septum creating slight rotation of nasal tip to right. Bilateral narrow internal nasal valve. Weakness of intermediate crura of the bilateral lower lateral cartilage and ptosis of the nasal tip. Bilateral inferior turbinate hypertrophy    Indications: This is a 62 y.o. female with persistent nasal congestion secondary to a deviated septum, internal and external nasal valve collapse and inferior turbinate hypertrophy. She is here for septoplasty, bilateral nasal valve repair and inferior turbinate reduction. Risks and benefits discussed with the patient including alternate treatment options, Informed consent was obtained, the patient elected to proceed with the planned procedure. DETAILS OF PROCEDURE(S):   The patient was brought to the operating room and placed in supine position operating table. She underwent uncomplicated general anesthesia with endotracheal intubation. The head of bed was turned 180 degrees.   10 mL of 1% lidocaine with epinephrine was injected into the septum, turbinates and nose. She was then prepped and draped in a sterile fashion. Attention was first turned to the left nasal cavity. A left sided hemitransfixion incision was made. A mucoperichondrial flap was raised. An L-shaped incision was made into the quadrangular cartilage leaving 1.5 cm dorsally and anteriorly. The contralateral mucoperichondrial flap was raised. The quadrangular cartilage was then disarticulated from the maxillary crest and vomer and this piece of it was removed. This revealed a residual rightward bony deviation of the vomer. This was sharply removed. Small part of the maxillary crest was also removed. This resulted in much straighter septum. Inverted the incision was made in the columellar region. Bilateral marginal incisions were made. The soft tissue was then reflected off of the lower lateral and upper lateral cartilages. The intermediate crura of the lower lateral cartilages which were buckled particularly on the right side. A small amount of the septum and upper lateral cartilages was trimmed as there was a hump in the cartilaginous bony nasal dorsum. The upper lateral cartilages were then reflected off of the nasal septum. Some of the intradomal ligament was divided in order to obtain better exposure. This showed a residual rightward deviation of the dorsal septum. Next  grafts were fashioned from the quadrangular cartilage that I removed. These were placed on each side of the septum and secured to the bilateral upper lateral cartilages with a quilting 5-0 PDS suture. This helped straighten the nasal septum and seem to straighten the tip. Next the patient was noted to have a fairly weak nasal tip and some tip ptosis. A pocket was made down to the anterior nasal spine. A columellar strut was then fashioned from the quadrangular cartilage. It was placed in the pocket down to the columellar strut.   It was then secured to the medial crura of the lower lateral cartilages using 5-0 PDS sutures. .  The intradomal ligament was then reapproximated and reinforced using 5-0 PDS sutures. Next the columellar incision was closed with simple erupted 6-0 Prolene sutures. The hemitransfixion as well as the bilateral marginal incisions were closed with simple interrupted 4-0 chromic sutures. Next an incision was made into the anterior aspect of each inferior turbinate. A submucosal reduction was performed as well as removal of some of the bone using the microdebrider blade. The bilateral inferior turbinates were then outfractured. Bilateral Godwin splints were placed and secured with a 3-0 Prolene suture. An Aquaplast splint was placed on the nasal dorsum and secured with Steri-Strips. The head of bed was then rotated back 180 degrees. The patient was allowed to awaken, extubated and taken to recovery stable condition peer    I attest that I was present for and did the entire procedure myself. Estimated Blood Loss: 50 mL                    Complications: There were no complications.     Celi Gordon MD

## 2023-01-04 ENCOUNTER — OFFICE VISIT (OUTPATIENT)
Dept: ENT CLINIC | Age: 58
End: 2023-01-04

## 2023-01-04 VITALS — WEIGHT: 188 LBS | HEIGHT: 64 IN | BODY MASS INDEX: 32.1 KG/M2

## 2023-01-04 DIAGNOSIS — R09.81 NASAL CONGESTION: Primary | ICD-10-CM

## 2023-01-04 PROCEDURE — 99024 POSTOP FOLLOW-UP VISIT: CPT | Performed by: OTOLARYNGOLOGY

## 2023-01-04 NOTE — PROGRESS NOTES
The patient is following up from her open septorhinoplasty and turbinate reduction that I did yesterday. She says she is actually doing great. She is not taking any pain medication. No significant nausea. No vision changes. She has had a bit of drainage, but not severe. Exam  Mild periorbital ecchymosis  No evidence of hematoma. Aquaplast and Godwin splints in place. Columellar sutures in place    Plan  Patient is actually doing very well for postoperative day 1. We discussed postoperative care and expectations.   She will see me on Monday to have the cast, splint and sutures removed

## 2023-01-09 ENCOUNTER — HOSPITAL ENCOUNTER (OUTPATIENT)
Dept: ULTRASOUND IMAGING | Age: 58
Discharge: HOME | End: 2023-01-09
Payer: COMMERCIAL

## 2023-01-09 ENCOUNTER — OFFICE VISIT (OUTPATIENT)
Dept: ENT CLINIC | Age: 58
End: 2023-01-09

## 2023-01-09 VITALS — BODY MASS INDEX: 31.92 KG/M2 | WEIGHT: 187 LBS | HEIGHT: 64 IN

## 2023-01-09 DIAGNOSIS — N93.9 ABNORMAL UTERINE BLEEDING (AUB): ICD-10-CM

## 2023-01-09 DIAGNOSIS — R09.81 NASAL CONGESTION: Primary | ICD-10-CM

## 2023-01-09 PROCEDURE — 99999 PR OFFICE/OUTPT VISIT,PROCEDURE ONLY: CPT | Performed by: OTOLARYNGOLOGY

## 2023-01-09 PROCEDURE — 76830 TRANSVAGINAL US NON-OB: CPT

## 2023-01-09 PROCEDURE — 76856 US EXAM PELVIC COMPLETE: CPT

## 2023-01-09 NOTE — PROGRESS NOTES
Patient is following up from her open septorhinoplasty that I did on January 30, 2023. Overall she is done very well with surgery. She does not have any complaints    Exam  Columellar incisions, Godwin splint and Aquaplast splint removed. Columellar incision is healing well. Intranasal incisions also healing well with a midline septum reduced turbinates. Does have some ongoing swelling of the nasal dorsum, but it is symmetric. Subjectively the patient feels as though she is breathing very well out of both sides of her nose right now. Plan  The patient appears to be healing very well. She can start using her nose normally over the course of the next week. I would like to see her in 1 month.

## 2023-02-06 ENCOUNTER — OFFICE VISIT (OUTPATIENT)
Dept: ENT CLINIC | Age: 58
End: 2023-02-06

## 2023-02-06 VITALS — BODY MASS INDEX: 31.76 KG/M2 | WEIGHT: 186 LBS | HEIGHT: 64 IN

## 2023-02-06 DIAGNOSIS — R09.81 NASAL CONGESTION: Primary | ICD-10-CM

## 2023-02-06 PROCEDURE — 99024 POSTOP FOLLOW-UP VISIT: CPT | Performed by: OTOLARYNGOLOGY

## 2023-02-06 NOTE — PROGRESS NOTES
Patient is following up from her open septorhinoplasty and turbinate reduction that I did on January 3, 2023. She said that she has been doing very well. Feels that she is breathing significantly better. Things have healed up well. Exam  Externally the patient has a symmetric nasal dorsum and nasal profile. She has minimal valve collapse on inspiration. Anterior rhinoscopy shows a midline septum and well reduced turbinates. I do not see any evidence of septal perforation. All incisions are healing well. Plan  Patient is doing very well after surgery. She is pleased with the results. She feels as though she is breathing significantly better.   She will follow-up with me as needed

## 2023-02-20 RX ORDER — LOSARTAN POTASSIUM 50 MG/1
TABLET ORAL
Qty: 30 TABLET | Refills: 3 | Status: SHIPPED | OUTPATIENT
Start: 2023-02-20

## 2023-03-31 DIAGNOSIS — I10 HYPERTENSION, ESSENTIAL: ICD-10-CM

## 2023-03-31 RX ORDER — HYDROCHLOROTHIAZIDE 25 MG/1
TABLET ORAL
Qty: 90 TABLET | Refills: 1 | Status: SHIPPED | OUTPATIENT
Start: 2023-03-31

## 2023-04-17 ENCOUNTER — HOSPITAL ENCOUNTER (OUTPATIENT)
Dept: WOMENS IMAGING | Age: 58
Discharge: HOME | End: 2023-04-17
Payer: COMMERCIAL

## 2023-04-17 DIAGNOSIS — Z12.31 BREAST CANCER SCREENING BY MAMMOGRAM: ICD-10-CM

## 2023-04-17 PROCEDURE — 77063 BREAST TOMOSYNTHESIS BI: CPT

## 2023-05-15 ENCOUNTER — OFFICE VISIT (OUTPATIENT)
Dept: GYNECOLOGY | Age: 58
End: 2023-05-15

## 2023-05-15 VITALS
OXYGEN SATURATION: 97 % | RESPIRATION RATE: 17 BRPM | HEART RATE: 79 BPM | BODY MASS INDEX: 31.41 KG/M2 | WEIGHT: 183 LBS

## 2023-05-15 DIAGNOSIS — Z01.419 WELL WOMAN EXAM WITH ROUTINE GYNECOLOGICAL EXAM: Primary | ICD-10-CM

## 2023-05-15 DIAGNOSIS — Z78.0 MENOPAUSE: ICD-10-CM

## 2023-05-20 RX ORDER — PROGESTERONE 100 MG/1
100 CAPSULE ORAL DAILY
Qty: 90 CAPSULE | Refills: 3 | Status: SHIPPED | OUTPATIENT
Start: 2023-05-20

## 2023-05-20 RX ORDER — ESTRADIOL 1 MG/1
1 TABLET ORAL DAILY
Qty: 90 TABLET | Refills: 3 | Status: SHIPPED | OUTPATIENT
Start: 2023-05-20

## 2023-05-20 ASSESSMENT — ENCOUNTER SYMPTOMS
ALLERGIC/IMMUNOLOGIC NEGATIVE: 1
GASTROINTESTINAL NEGATIVE: 1
RESPIRATORY NEGATIVE: 1
EYES NEGATIVE: 1

## 2023-05-20 NOTE — PROGRESS NOTES
for the 5/15/23 encounter (Office Visit) with Ramon Stephens MD   Medication Sig Dispense Refill    hydroCHLOROthiazide (HYDRODIURIL) 25 MG tablet TAKE ONE TABLET BY MOUTH EVERY MORNING 90 tablet 1    losartan (COZAAR) 50 MG tablet TAKE ONE TABLET BY MOUTH ONE TIME A DAY 30 tablet 3    buPROPion (WELLBUTRIN XL) 150 MG extended release tablet TAKE ONE TABLET BY MOUTH EVERY MORNING 90 tablet 1    liothyronine (CYTOMEL) 5 MCG tablet TAKE TWO TABLETS BY MOUTH EVERY  tablet 3    ipratropium (ATROVENT) 0.03 % nasal spray 2 sprays by Nasal route as needed (nasal drainage) 30 mL 5    Ascorbic Acid (VITAMIN C) 500 MG tablet Take 1 tablet by mouth daily      magnesium 30 MG tablet Take 400 mg by mouth daily       zinc gluconate 50 MG tablet Take 1 tablet by mouth daily       Family History   Problem Relation Age of Onset    Cancer Mother         uterine sarcoma, skin non-melanoma    Stroke Mother     Alzheimer's Disease Mother     Cancer Father         skin non-melanoma    Alzheimer's Disease Father      Pulse 79   Resp 17   Wt 183 lb (83 kg)   LMP 12/15/2015   SpO2 97%   BMI 31.41 kg/m²       Objective:   Physical Exam  Constitutional:       General: She is not in acute distress. Appearance: Normal appearance. She is well-developed and normal weight. She is not diaphoretic. HENT:      Head: Normocephalic and atraumatic. Nose: Nose normal.      Mouth/Throat:      Mouth: Mucous membranes are moist.      Pharynx: Oropharynx is clear. Eyes:      Extraocular Movements: Extraocular movements intact. Neck:      Thyroid: No thyromegaly. Cardiovascular:      Rate and Rhythm: Normal rate and regular rhythm. Heart sounds: Normal heart sounds. No murmur heard. No friction rub. No gallop. Pulmonary:      Effort: Pulmonary effort is normal. No respiratory distress. Breath sounds: Normal breath sounds. No wheezing or rales.    Chest:   Breasts:     Right: Normal. No swelling, bleeding, inverted

## 2023-06-18 ASSESSMENT — PATIENT HEALTH QUESTIONNAIRE - PHQ9
SUM OF ALL RESPONSES TO PHQ QUESTIONS 1-9: 0
SUM OF ALL RESPONSES TO PHQ QUESTIONS 1-9: 0
SUM OF ALL RESPONSES TO PHQ9 QUESTIONS 1 & 2: 0
SUM OF ALL RESPONSES TO PHQ QUESTIONS 1-9: 0
SUM OF ALL RESPONSES TO PHQ QUESTIONS 1-9: 0
SUM OF ALL RESPONSES TO PHQ9 QUESTIONS 1 & 2: 0
2. FEELING DOWN, DEPRESSED OR HOPELESS: 0
1. LITTLE INTEREST OR PLEASURE IN DOING THINGS: 0
1. LITTLE INTEREST OR PLEASURE IN DOING THINGS: NOT AT ALL
2. FEELING DOWN, DEPRESSED OR HOPELESS: NOT AT ALL

## 2023-06-21 ENCOUNTER — OFFICE VISIT (OUTPATIENT)
Dept: FAMILY MEDICINE CLINIC | Age: 58
End: 2023-06-21
Payer: COMMERCIAL

## 2023-06-21 VITALS
HEIGHT: 64 IN | DIASTOLIC BLOOD PRESSURE: 84 MMHG | OXYGEN SATURATION: 98 % | BODY MASS INDEX: 29.53 KG/M2 | SYSTOLIC BLOOD PRESSURE: 120 MMHG | WEIGHT: 173 LBS | HEART RATE: 89 BPM

## 2023-06-21 DIAGNOSIS — E78.00 HYPERCHOLESTEREMIA: ICD-10-CM

## 2023-06-21 DIAGNOSIS — L40.50 PSORIATIC ARTHRITIS (HCC): ICD-10-CM

## 2023-06-21 DIAGNOSIS — E03.9 ACQUIRED UNDERACTIVE THYROID: ICD-10-CM

## 2023-06-21 DIAGNOSIS — Z00.00 ENCOUNTER FOR WELL ADULT EXAM WITHOUT ABNORMAL FINDINGS: Primary | ICD-10-CM

## 2023-06-21 DIAGNOSIS — I10 HYPERTENSION, ESSENTIAL: ICD-10-CM

## 2023-06-21 PROCEDURE — 3074F SYST BP LT 130 MM HG: CPT | Performed by: FAMILY MEDICINE

## 2023-06-21 PROCEDURE — 99396 PREV VISIT EST AGE 40-64: CPT | Performed by: FAMILY MEDICINE

## 2023-06-21 PROCEDURE — 3079F DIAST BP 80-89 MM HG: CPT | Performed by: FAMILY MEDICINE

## 2023-06-21 RX ORDER — FLUTICASONE PROPIONATE 50 MCG
SPRAY, SUSPENSION (ML) NASAL
COMMUNITY

## 2023-06-21 RX ORDER — CLOBETASOL PROPIONATE 0.46 MG/ML
SOLUTION TOPICAL
COMMUNITY
Start: 2023-04-19

## 2023-06-21 SDOH — ECONOMIC STABILITY: FOOD INSECURITY: WITHIN THE PAST 12 MONTHS, THE FOOD YOU BOUGHT JUST DIDN'T LAST AND YOU DIDN'T HAVE MONEY TO GET MORE.: NEVER TRUE

## 2023-06-21 SDOH — ECONOMIC STABILITY: INCOME INSECURITY: HOW HARD IS IT FOR YOU TO PAY FOR THE VERY BASICS LIKE FOOD, HOUSING, MEDICAL CARE, AND HEATING?: NOT HARD AT ALL

## 2023-06-21 SDOH — ECONOMIC STABILITY: FOOD INSECURITY: WITHIN THE PAST 12 MONTHS, YOU WORRIED THAT YOUR FOOD WOULD RUN OUT BEFORE YOU GOT MONEY TO BUY MORE.: NEVER TRUE

## 2023-06-21 SDOH — ECONOMIC STABILITY: HOUSING INSECURITY
IN THE LAST 12 MONTHS, WAS THERE A TIME WHEN YOU DID NOT HAVE A STEADY PLACE TO SLEEP OR SLEPT IN A SHELTER (INCLUDING NOW)?: NO

## 2023-06-21 NOTE — PATIENT INSTRUCTIONS
INSTRUCTIONS  NEXT APPOINTMENT: Please schedule check-up in 6 months with Dr. Francoise Whitfield or her NP, Cedric White. PLEASE GET FASTING BLOODWORK DRAWN SOON. Lab is on first floor in [address]. Hours Monday to Friday 6:30 AM to 4 PM.   Please get flu vaccine when available in fall. Can get either at this office or at stores such as [employer] and [employer]. Would get new bivalent COVID booster in fall. Sooner if COVID numbers go up over summer and it has been over a year since last booster. Separate from other vaccines by at least 2 weeks.

## 2023-06-21 NOTE — PROGRESS NOTES
Vaccine (5 - Booster for Moderna series) 01/12/2022     Current Outpatient Medications   Medication Instructions    buPROPion (WELLBUTRIN XL) 150 MG extended release tablet TAKE ONE TABLET BY MOUTH EVERY MORNING    clobetasol (TEMOVATE) 0.05 % external solution No dose, route, or frequency recorded. estradiol (ESTRACE) 1 mg, Oral, DAILY    fluticasone (FLONASE) 50 MCG/ACT nasal spray No dose, route, or frequency recorded.     hydroCHLOROthiazide (HYDRODIURIL) 25 MG tablet TAKE ONE TABLET BY MOUTH EVERY MORNING    ipratropium (ATROVENT) 0.03 % nasal spray 2 sprays, Nasal, PRN    liothyronine (CYTOMEL) 5 MCG tablet TAKE TWO TABLETS BY MOUTH EVERY DAY    losartan (COZAAR) 50 MG tablet TAKE ONE TABLET BY MOUTH EVERY DAY    magnesium 400 mg, Oral, DAILY    Multiple Vitamin (MULTIVITAMINS PO) 1 tablet, Oral, DAILY    vitamin C (ASCORBIC ACID) 500 mg, Oral, DAILY    zinc gluconate 50 mg, Oral, DAILY     Family History   Problem Relation Age of Onset    Cancer Mother         uterine sarcoma, skin non-melanoma    Stroke Mother     Alzheimer's Disease Mother     Cancer Father         skin non-melanoma    Alzheimer's Disease Father      LAST LABS  Lab Results   Component Value Date    LDLCALC 120 (H) 06/01/2022     Lab Results   Component Value Date    HDL 70 (H) 06/01/2022     Lab Results   Component Value Date    TRIG 190 (H) 06/01/2022     Lab Results   Component Value Date     12/27/2022    K 3.9 12/27/2022    CREATININE 0.7 12/27/2022     Lab Results   Component Value Date    WBC 5.1 12/27/2022    HGB 12.5 12/27/2022     12/27/2022     Lab Results   Component Value Date    ALT 7 (L) 12/27/2022    AST 19 12/27/2022    ALKPHOS 87 10/17/2022    BILITOT <0.2 10/17/2022     TSH (uIU/mL)   Date Value   06/01/2022 1.61     Lab Results   Component Value Date    GLUCOSE 99 12/27/2022     Lab Results   Component Value Date    LABA1C 5.4 06/30/2022    LABA1C 5.3 04/03/2018     Objective:   PHYSICAL EXAM   /84

## 2023-06-22 DIAGNOSIS — I10 HYPERTENSION, ESSENTIAL: ICD-10-CM

## 2023-06-22 DIAGNOSIS — E03.9 ACQUIRED UNDERACTIVE THYROID: ICD-10-CM

## 2023-06-22 DIAGNOSIS — E78.00 HYPERCHOLESTEREMIA: ICD-10-CM

## 2023-06-22 DIAGNOSIS — Z00.00 ENCOUNTER FOR WELL ADULT EXAM WITHOUT ABNORMAL FINDINGS: ICD-10-CM

## 2023-06-22 LAB
ALBUMIN SERPL-MCNC: 4.3 G/DL (ref 3.4–5)
ALBUMIN/GLOB SERPL: 1.7 {RATIO} (ref 1.1–2.2)
ALP SERPL-CCNC: 79 U/L (ref 40–129)
ALT SERPL-CCNC: 14 U/L (ref 10–40)
ANION GAP SERPL CALCULATED.3IONS-SCNC: 8 MMOL/L (ref 3–16)
AST SERPL-CCNC: 26 U/L (ref 15–37)
BILIRUB SERPL-MCNC: <0.2 MG/DL (ref 0–1)
BUN SERPL-MCNC: 15 MG/DL (ref 7–20)
CALCIUM SERPL-MCNC: 10.1 MG/DL (ref 8.3–10.6)
CHLORIDE SERPL-SCNC: 97 MMOL/L (ref 99–110)
CHOLEST SERPL-MCNC: 229 MG/DL (ref 0–199)
CO2 SERPL-SCNC: 29 MMOL/L (ref 21–32)
CREAT SERPL-MCNC: 0.9 MG/DL (ref 0.6–1.1)
GFR SERPLBLD CREATININE-BSD FMLA CKD-EPI: >60 ML/MIN/{1.73_M2}
GLUCOSE SERPL-MCNC: 104 MG/DL (ref 70–99)
HDLC SERPL-MCNC: 70 MG/DL (ref 40–60)
LDLC SERPL CALC-MCNC: 129 MG/DL
POTASSIUM SERPL-SCNC: 4.8 MMOL/L (ref 3.5–5.1)
PROT SERPL-MCNC: 6.9 G/DL (ref 6.4–8.2)
SODIUM SERPL-SCNC: 134 MMOL/L (ref 136–145)
TRIGL SERPL-MCNC: 148 MG/DL (ref 0–150)
TSH SERPL DL<=0.005 MIU/L-ACNC: 2.98 UIU/ML (ref 0.27–4.2)

## 2023-08-23 DIAGNOSIS — E03.9 ACQUIRED UNDERACTIVE THYROID: ICD-10-CM

## 2023-08-23 RX ORDER — LIOTHYRONINE SODIUM 5 UG/1
TABLET ORAL
Qty: 180 TABLET | Refills: 3 | Status: SHIPPED | OUTPATIENT
Start: 2023-08-23

## 2023-09-12 DIAGNOSIS — I10 HYPERTENSION, ESSENTIAL: Primary | ICD-10-CM

## 2023-09-12 DIAGNOSIS — F41.9 ANXIETY: ICD-10-CM

## 2023-09-12 RX ORDER — LOSARTAN POTASSIUM 50 MG/1
50 TABLET ORAL DAILY
Qty: 30 TABLET | Refills: 2 | Status: SHIPPED | OUTPATIENT
Start: 2023-09-12

## 2023-09-12 RX ORDER — BUPROPION HYDROCHLORIDE 150 MG/1
TABLET ORAL
Qty: 90 TABLET | Refills: 1 | Status: SHIPPED | OUTPATIENT
Start: 2023-09-12

## 2023-09-22 DIAGNOSIS — I10 HYPERTENSION, ESSENTIAL: ICD-10-CM

## 2023-09-22 RX ORDER — HYDROCHLOROTHIAZIDE 25 MG/1
TABLET ORAL
Qty: 90 TABLET | Refills: 1 | Status: SHIPPED | OUTPATIENT
Start: 2023-09-22

## 2023-12-04 DIAGNOSIS — J30.0 VASOMOTOR RHINITIS: ICD-10-CM

## 2023-12-04 RX ORDER — IPRATROPIUM BROMIDE 21 UG/1
SPRAY, METERED NASAL
Qty: 30 ML | Refills: 5 | Status: SHIPPED | OUTPATIENT
Start: 2023-12-04

## 2023-12-15 DIAGNOSIS — I10 HYPERTENSION, ESSENTIAL: ICD-10-CM

## 2023-12-15 RX ORDER — LOSARTAN POTASSIUM 50 MG/1
50 TABLET ORAL DAILY
Qty: 30 TABLET | Refills: 2 | Status: SHIPPED | OUTPATIENT
Start: 2023-12-15

## 2024-01-03 ENCOUNTER — OFFICE VISIT (OUTPATIENT)
Dept: FAMILY MEDICINE CLINIC | Age: 59
End: 2024-01-03
Payer: COMMERCIAL

## 2024-01-03 VITALS
WEIGHT: 171 LBS | OXYGEN SATURATION: 98 % | DIASTOLIC BLOOD PRESSURE: 80 MMHG | HEIGHT: 64 IN | BODY MASS INDEX: 29.19 KG/M2 | HEART RATE: 84 BPM | SYSTOLIC BLOOD PRESSURE: 122 MMHG

## 2024-01-03 DIAGNOSIS — I10 HYPERTENSION, ESSENTIAL: Primary | ICD-10-CM

## 2024-01-03 DIAGNOSIS — L40.50 PSORIATIC ARTHRITIS (HCC): ICD-10-CM

## 2024-01-03 DIAGNOSIS — Z23 NEED FOR PNEUMOCOCCAL VACCINATION: ICD-10-CM

## 2024-01-03 DIAGNOSIS — F41.9 ANXIETY: ICD-10-CM

## 2024-01-03 DIAGNOSIS — E03.9 ACQUIRED UNDERACTIVE THYROID: ICD-10-CM

## 2024-01-03 PROCEDURE — 3079F DIAST BP 80-89 MM HG: CPT | Performed by: FAMILY MEDICINE

## 2024-01-03 PROCEDURE — 90677 PCV20 VACCINE IM: CPT | Performed by: FAMILY MEDICINE

## 2024-01-03 PROCEDURE — 90471 IMMUNIZATION ADMIN: CPT | Performed by: FAMILY MEDICINE

## 2024-01-03 PROCEDURE — 99213 OFFICE O/P EST LOW 20 MIN: CPT | Performed by: FAMILY MEDICINE

## 2024-01-03 PROCEDURE — 3074F SYST BP LT 130 MM HG: CPT | Performed by: FAMILY MEDICINE

## 2024-01-03 ASSESSMENT — PATIENT HEALTH QUESTIONNAIRE - PHQ9
SUM OF ALL RESPONSES TO PHQ QUESTIONS 1-9: 0
SUM OF ALL RESPONSES TO PHQ9 QUESTIONS 1 & 2: 0
2. FEELING DOWN, DEPRESSED OR HOPELESS: 0
1. LITTLE INTEREST OR PLEASURE IN DOING THINGS: 0
SUM OF ALL RESPONSES TO PHQ QUESTIONS 1-9: 0

## 2024-01-03 NOTE — PATIENT INSTRUCTIONS
INSTRUCTIONS  NEXT APPOINTMENT: Please schedule annual complete physical (30 minutes) in 6 months with Dr. Ocampo or her NP, Surekha Chaves.   PLEASE TAKE THIS FORM TO CHECK-OUT WINDOW TO SCHEDULE NEXT VISIT.   Please get annual flu and covid vaccines when available in fall.  Can get at stores such as [employer] and [employer].

## 2024-01-03 NOTE — PROGRESS NOTES
MORNING    ipratropium (ATROVENT) 0.03 % nasal spray USE 2 SPRAYS IN EACH NOSTRIL DAILY AS NEEDED FOR NASAL DRAINAGE    liothyronine (CYTOMEL) 5 MCG tablet TAKE TWO TABLETS BY MOUTH EVERY DAY    losartan (COZAAR) 50 mg, Oral, DAILY    magnesium 400 mg, Oral, DAILY    Multiple Vitamin (MULTIVITAMINS PO) 1 tablet, Oral, DAILY    vitamin C (ASCORBIC ACID) 500 mg, Oral, DAILY    zinc gluconate 50 mg, Oral, DAILY     LAST LABS  Lab Results   Component Value Date    LDLCALC 159 (H) 11/13/2023     Lab Results   Component Value Date    HDL 80 (H) 11/13/2023     Lab Results   Component Value Date    TRIG 146 11/13/2023     Lab Results   Component Value Date    ALT 13 11/13/2023    AST 25 11/13/2023    ALKPHOS 76 11/13/2023    BILITOT <0.2 11/13/2023     Lab Results   Component Value Date     11/13/2023    K 4.3 11/13/2023    CREATININE 0.8 11/13/2023     Lab Results   Component Value Date    LABGLOM >60 11/13/2023    LABGLOM >60 06/22/2023    LABGLOM >60 12/27/2022     Estimated Creatinine Clearance: 77 mL/min (based on SCr of 0.8 mg/dL).  Lab Results   Component Value Date    WBC 5.1 12/27/2022    HGB 12.5 12/27/2022     12/27/2022     TSH (uIU/mL)   Date Value   11/13/2023 3.06     Glucose   Date Value Ref Range Status   11/13/2023 100 (H) 70 - 99 mg/dL Final     Lab Results   Component Value Date    LABA1C 5.5 11/13/2023    LABA1C 5.4 06/30/2022    LABA1C 5.3 04/03/2018     Objective:   PHYSICAL EXAM   /80 (Site: Right Upper Arm, Position: Sitting, Cuff Size: Medium Adult)   Pulse 84   Ht 1.626 m (5' 4\")   Wt 77.6 kg (171 lb)   LMP 12/15/2015   SpO2 98%   BMI 29.35 kg/m²   BP Readings from Last 5 Encounters:   01/03/24 122/80   06/21/23 120/84   01/03/23 135/89   12/21/22 122/84   11/21/22 138/82     Wt Readings from Last 5 Encounters:   01/03/24 77.6 kg (171 lb)   06/21/23 78.5 kg (173 lb)   05/15/23 83 kg (183 lb)   02/06/23 84.4 kg (186 lb)   01/09/23 84.8 kg (187 lb)   Weight assessment:

## 2024-01-21 ENCOUNTER — NURSE TRIAGE (OUTPATIENT)
Dept: OTHER | Facility: CLINIC | Age: 59
End: 2024-01-21

## 2024-03-05 DIAGNOSIS — F41.9 ANXIETY: ICD-10-CM

## 2024-03-05 DIAGNOSIS — I10 HYPERTENSION, ESSENTIAL: ICD-10-CM

## 2024-03-05 RX ORDER — BUPROPION HYDROCHLORIDE 150 MG/1
TABLET ORAL
Qty: 90 TABLET | Refills: 1 | Status: SHIPPED | OUTPATIENT
Start: 2024-03-05

## 2024-03-05 RX ORDER — HYDROCHLOROTHIAZIDE 25 MG/1
TABLET ORAL
Qty: 90 TABLET | Refills: 1 | Status: SHIPPED | OUTPATIENT
Start: 2024-03-05

## 2024-03-05 RX ORDER — LOSARTAN POTASSIUM 50 MG/1
50 TABLET ORAL DAILY
Qty: 30 TABLET | Refills: 2 | Status: SHIPPED | OUTPATIENT
Start: 2024-03-05

## 2024-04-18 ENCOUNTER — HOSPITAL ENCOUNTER (OUTPATIENT)
Dept: WOMENS IMAGING | Age: 59
Discharge: HOME | End: 2024-04-18
Payer: COMMERCIAL

## 2024-04-18 VITALS — BODY MASS INDEX: 29.19 KG/M2 | HEIGHT: 64 IN | WEIGHT: 171 LBS

## 2024-04-18 DIAGNOSIS — Z12.31 SCREENING MAMMOGRAM FOR BREAST CANCER: ICD-10-CM

## 2024-04-18 PROCEDURE — 77063 BREAST TOMOSYNTHESIS BI: CPT

## 2024-05-20 ENCOUNTER — OFFICE VISIT (OUTPATIENT)
Dept: GYNECOLOGY | Age: 59
End: 2024-05-20
Payer: COMMERCIAL

## 2024-05-20 VITALS
HEIGHT: 64 IN | SYSTOLIC BLOOD PRESSURE: 122 MMHG | WEIGHT: 171 LBS | DIASTOLIC BLOOD PRESSURE: 75 MMHG | OXYGEN SATURATION: 99 % | RESPIRATION RATE: 17 BRPM | HEART RATE: 79 BPM | BODY MASS INDEX: 29.19 KG/M2

## 2024-05-20 DIAGNOSIS — Z01.419 WELL WOMAN EXAM WITH ROUTINE GYNECOLOGICAL EXAM: Primary | ICD-10-CM

## 2024-05-20 DIAGNOSIS — Z78.0 MENOPAUSE: ICD-10-CM

## 2024-05-20 DIAGNOSIS — R23.2 HOT FLASHES: ICD-10-CM

## 2024-05-20 PROCEDURE — 3078F DIAST BP <80 MM HG: CPT | Performed by: OBSTETRICS & GYNECOLOGY

## 2024-05-20 PROCEDURE — 3074F SYST BP LT 130 MM HG: CPT | Performed by: OBSTETRICS & GYNECOLOGY

## 2024-05-20 PROCEDURE — 99396 PREV VISIT EST AGE 40-64: CPT | Performed by: OBSTETRICS & GYNECOLOGY

## 2024-05-22 RX ORDER — FEZOLINETANT 45 MG/1
1 TABLET, FILM COATED ORAL DAILY
Qty: 90 TABLET | Refills: 3 | Status: SHIPPED | OUTPATIENT
Start: 2024-05-22

## 2024-05-22 ASSESSMENT — ENCOUNTER SYMPTOMS
GASTROINTESTINAL NEGATIVE: 1
RESPIRATORY NEGATIVE: 1
EYES NEGATIVE: 1
ALLERGIC/IMMUNOLOGIC NEGATIVE: 1

## 2024-05-22 NOTE — PROGRESS NOTES
Subjective   Patient ID: Fanta Caldwell is a 59 y.o. female.    Patient is here for annual. States hot flashes worse. Interested in veozah.    Gynecologic Exam        Review of Systems   Constitutional: Negative.    HENT: Negative.     Eyes: Negative.    Respiratory: Negative.     Cardiovascular: Negative.    Gastrointestinal: Negative.    Endocrine: Negative.    Genitourinary: Negative.    Musculoskeletal: Negative.    Skin: Negative.    Allergic/Immunologic: Negative.    Neurological: Negative.    Hematological: Negative.    Psychiatric/Behavioral: Negative.          Date of Birth 1965  Past Medical History:   Diagnosis Date    Abnormal Pap smear of cervix     Acquired underactive thyroid 04/20/2017    Asthma     when have colds goes into asthma    Chronic allergic rhinitis 04/03/2018    Diverticulitis 2006    colon 2006    Family history of Alzheimer's disease 11/15/2016    Fibrocystic breast     Hypercholesteremia due to HDL > 100 10/17/2013    no meds    Irregular uterine bleeding     Left hand pain 10/2022    Menopause ovarian failure     Primary osteoarthritis of first carpometacarpal joint of left hand 09/25/2015    Psoriatic arthritis (HCC) 12/21/2022     Past Surgical History:   Procedure Laterality Date    BREAST BIOPSY      COLONOSCOPY      x2    COLPOSCOPY      Dr. Coelho     COSMETIC SURGERY      laser liposuction-abdomen and thighs    EYE SURGERY      corrective    FINGER TRIGGER RELEASE Left 5/19/16    Middle Finger    FINGER TRIGGER RELEASE Right 1/20/2022    RIGHT INDEX FINGER, MIDDLE FINGER AND RING FINGER TRIGGER FINGER RELEASE performed by Jose Holm MD at [facility] OR    HAND SURGERY Left 5/8/14    Middle Finger Ulnar Digital Nerve contusion and neuropraxia    NOSE SURGERY Bilateral 1/3/2023    BILATERAL NASAL VALVE REPAIR, SEPTOPLASTY performed by Antonio Jurado MD at [facility] OR    NOSE SURGERY N/A 1/3/2023    INFERIOR TURBINATE REDUCTION performed by Antonio Jurado MD at [facility] OR

## 2024-05-23 ENCOUNTER — TELEPHONE (OUTPATIENT)
Dept: ADMINISTRATIVE | Age: 59
End: 2024-05-23

## 2024-05-23 NOTE — TELEPHONE ENCOUNTER
Submitted PA for Veozah 45MG tablets   Via [facility] (Key: [id number])  STATUS: PENDING.    Follow up done daily; if no decision with in three days we will refax.  If another three days goes by with no decision will call the insurance for status.

## 2024-05-28 NOTE — TELEPHONE ENCOUNTER
Received additional clinical question. Please review and advise.     If this requires a response please respond to the pool ( P MHCX PSC MEDICATION PRE-AUTH).      Thank you please advise patient.

## 2024-05-31 NOTE — TELEPHONE ENCOUNTER
2nd request. Proceed with authorization?     If this requires a response please respond to the pool ( P MHCX PSC MEDICATION PRE-AUTH).      Thank you please advise patient.

## 2024-06-05 LAB
CHOLEST SERPL-MCNC: 251 MG/DL (ref 0–199)
GLUCOSE SERPL-MCNC: 98 MG/DL (ref 70–99)
HDLC SERPL-MCNC: 82 MG/DL (ref 40–60)
LDLC SERPL CALC-MCNC: 145 MG/DL
TRIGL SERPL-MCNC: 119 MG/DL (ref 0–150)

## 2024-06-11 NOTE — TELEPHONE ENCOUNTER
Answered clinical questions available in media. Faxed clinicals, last available lab and letter to [phone].

## 2024-06-13 NOTE — TELEPHONE ENCOUNTER
Tell patient that her veozah is denied again.     Tell her we could try gabapentin to see if this could help.     Also, did she sign up for the coupon for Veozah on line?

## 2024-06-14 NOTE — TELEPHONE ENCOUNTER
Called and spoke to patient related message to her from  Dr Kelley. She has already tried the Gabapentin and it didn't help. She wanted to thank Dr Kelley for trying she will go on line ans get the coupon

## 2024-06-27 ENCOUNTER — OFFICE VISIT (OUTPATIENT)
Dept: FAMILY MEDICINE CLINIC | Age: 59
End: 2024-06-27
Payer: COMMERCIAL

## 2024-06-27 VITALS
HEIGHT: 64 IN | SYSTOLIC BLOOD PRESSURE: 112 MMHG | HEART RATE: 83 BPM | BODY MASS INDEX: 28.17 KG/M2 | DIASTOLIC BLOOD PRESSURE: 78 MMHG | WEIGHT: 165 LBS

## 2024-06-27 DIAGNOSIS — E03.9 ACQUIRED UNDERACTIVE THYROID: ICD-10-CM

## 2024-06-27 DIAGNOSIS — N95.1 MENOPAUSAL VASOMOTOR SYNDROME: ICD-10-CM

## 2024-06-27 DIAGNOSIS — F41.9 ANXIETY: ICD-10-CM

## 2024-06-27 DIAGNOSIS — E66.3 OVERWEIGHT WITH BODY MASS INDEX (BMI) 25.0-29.9: ICD-10-CM

## 2024-06-27 DIAGNOSIS — Z23 NEED FOR TETANUS BOOSTER: ICD-10-CM

## 2024-06-27 DIAGNOSIS — I10 HYPERTENSION, ESSENTIAL: ICD-10-CM

## 2024-06-27 DIAGNOSIS — Z00.00 ENCOUNTER FOR WELL ADULT EXAM WITHOUT ABNORMAL FINDINGS: Primary | ICD-10-CM

## 2024-06-27 PROBLEM — J45.909 REACTIVE AIRWAY DISEASE: Status: RESOLVED | Noted: 2017-11-20 | Resolved: 2024-06-27

## 2024-06-27 PROBLEM — H68.003 EUSTACHIAN CATARRH, BILATERAL: Status: RESOLVED | Noted: 2018-05-15 | Resolved: 2024-06-27

## 2024-06-27 PROBLEM — M79.2 NEURITIS: Status: RESOLVED | Noted: 2018-06-12 | Resolved: 2024-06-27

## 2024-06-27 PROBLEM — J30.9 ALLERGIC SINUSITIS: Status: RESOLVED | Noted: 2018-05-15 | Resolved: 2024-06-27

## 2024-06-27 PROBLEM — M65.4 DE QUERVAIN'S DISEASE (RADIAL STYLOID TENOSYNOVITIS): Status: RESOLVED | Noted: 2022-10-14 | Resolved: 2024-06-27

## 2024-06-27 PROBLEM — M65.331 TRIGGER MIDDLE FINGER OF RIGHT HAND: Status: RESOLVED | Noted: 2020-10-19 | Resolved: 2024-06-27

## 2024-06-27 PROBLEM — Z87.19 HISTORY OF DIVERTICULITIS: Status: ACTIVE | Noted: 2020-10-04

## 2024-06-27 PROBLEM — M65.4 DE QUERVAIN'S TENOSYNOVITIS, LEFT: Status: RESOLVED | Noted: 2022-09-26 | Resolved: 2024-06-27

## 2024-06-27 PROCEDURE — 3078F DIAST BP <80 MM HG: CPT | Performed by: FAMILY MEDICINE

## 2024-06-27 PROCEDURE — 90471 IMMUNIZATION ADMIN: CPT | Performed by: FAMILY MEDICINE

## 2024-06-27 PROCEDURE — 90715 TDAP VACCINE 7 YRS/> IM: CPT | Performed by: FAMILY MEDICINE

## 2024-06-27 PROCEDURE — 99396 PREV VISIT EST AGE 40-64: CPT | Performed by: FAMILY MEDICINE

## 2024-06-27 PROCEDURE — 3074F SYST BP LT 130 MM HG: CPT | Performed by: FAMILY MEDICINE

## 2024-06-27 RX ORDER — LOSARTAN POTASSIUM 50 MG/1
25 TABLET ORAL DAILY
COMMUNITY
Start: 2024-06-27

## 2024-06-27 RX ORDER — ESCITALOPRAM OXALATE 10 MG/1
10 TABLET ORAL DAILY
Qty: 30 TABLET | Refills: 0 | Status: SHIPPED | OUTPATIENT
Start: 2024-06-27

## 2024-06-27 SDOH — ECONOMIC STABILITY: FOOD INSECURITY: WITHIN THE PAST 12 MONTHS, YOU WORRIED THAT YOUR FOOD WOULD RUN OUT BEFORE YOU GOT MONEY TO BUY MORE.: NEVER TRUE

## 2024-06-27 SDOH — ECONOMIC STABILITY: FOOD INSECURITY: WITHIN THE PAST 12 MONTHS, THE FOOD YOU BOUGHT JUST DIDN'T LAST AND YOU DIDN'T HAVE MONEY TO GET MORE.: NEVER TRUE

## 2024-06-27 SDOH — ECONOMIC STABILITY: INCOME INSECURITY: HOW HARD IS IT FOR YOU TO PAY FOR THE VERY BASICS LIKE FOOD, HOUSING, MEDICAL CARE, AND HEATING?: NOT HARD AT ALL

## 2024-06-27 NOTE — PATIENT INSTRUCTIONS
INSTRUCTIONS  NEXT APPOINTMENT: Please schedule check-up in 6 months (January OK)  PLEASE TAKE THIS FORM TO CHECK-OUT WINDOW TO SCHEDULE NEXT VISIT.   Please get annual flu and covid vaccines when available in fall.  Can get at stores such as [employer] and [email or web address].  Start lexapro for one month. Let me know if works for hot flashes. IF not, will switch to pristiq for a month. Continue Wellbutrin.   Patient Education      Advance Care Planning     Advance Care Planning opens a door to talk about and write down your wishes before a sudden accident or illness.  Make your goals, values, and preferences known.     This puts you in the ’s seat and helps others know what matters most to you so they won’t have to guess.      Where can you learn more?    Go to [email or web address]   to learn how to:    Name someone you trust to make healthcare decisions for you, only if you can’t. (Healthcare Power of )    Document your wishes for care if you were seriously ill and not expected to recover or are approaching end of life. (Advance Directive or Living Will)    The same page can be found using the QR code below.

## 2024-06-27 NOTE — PROGRESS NOTES
N/A  SKIN: warm and dry  No rashes or suspicious lesions  No nodules or induration  PSYCH:    Alert and oriented  Normal reasoning, insight good  Facial expressions full, mood appropriate  No memory disturbance noted  MUSCULOSKEL:    Gait normal, assistive device: none  No significant finger or nail findings  Spine symmetric, no deformities, no kyphosis

## 2024-07-22 ENCOUNTER — PATIENT MESSAGE (OUTPATIENT)
Dept: FAMILY MEDICINE CLINIC | Age: 59
End: 2024-07-22

## 2024-07-22 DIAGNOSIS — N95.1 MENOPAUSAL VASOMOTOR SYNDROME: Primary | ICD-10-CM

## 2024-07-22 DIAGNOSIS — F41.9 ANXIETY: ICD-10-CM

## 2024-07-22 RX ORDER — DESVENLAFAXINE SUCCINATE 50 MG/1
50 TABLET, EXTENDED RELEASE ORAL DAILY
Qty: 30 TABLET | Refills: 3 | Status: SHIPPED | OUTPATIENT
Start: 2024-07-22

## 2024-08-20 ENCOUNTER — PATIENT MESSAGE (OUTPATIENT)
Dept: FAMILY MEDICINE CLINIC | Age: 59
End: 2024-08-20

## 2024-08-20 DIAGNOSIS — E78.00 HYPERCHOLESTEREMIA: ICD-10-CM

## 2024-08-20 DIAGNOSIS — F41.9 ANXIETY: ICD-10-CM

## 2024-08-20 DIAGNOSIS — N95.1 MENOPAUSAL VASOMOTOR SYNDROME: ICD-10-CM

## 2024-08-20 DIAGNOSIS — R23.2 HOT FLASHES: ICD-10-CM

## 2024-08-20 DIAGNOSIS — I10 HYPERTENSION, ESSENTIAL: Primary | ICD-10-CM

## 2024-08-20 DIAGNOSIS — Z78.0 MENOPAUSE: ICD-10-CM

## 2024-08-21 RX ORDER — FEZOLINETANT 45 MG/1
1 TABLET, FILM COATED ORAL DAILY
Qty: 90 TABLET | Refills: 3 | Status: SHIPPED | OUTPATIENT
Start: 2024-08-21

## 2024-08-22 RX ORDER — ESCITALOPRAM OXALATE 10 MG/1
10 TABLET ORAL DAILY
Qty: 30 TABLET | Refills: 0 | Status: SHIPPED | OUTPATIENT
Start: 2024-08-22

## 2024-08-22 NOTE — TELEPHONE ENCOUNTER
PA is asking for pts kidney and liver function blood test done within the last 6 months,.  Can you order and I will have pt get this done?

## 2024-08-23 ENCOUNTER — TELEPHONE (OUTPATIENT)
Dept: FAMILY MEDICINE CLINIC | Age: 59
End: 2024-08-23

## 2024-08-23 DIAGNOSIS — I10 HYPERTENSION, ESSENTIAL: ICD-10-CM

## 2024-08-23 RX ORDER — LOSARTAN POTASSIUM 50 MG/1
25 TABLET ORAL DAILY
Qty: 30 TABLET | Refills: 2 | Status: SHIPPED | OUTPATIENT
Start: 2024-08-23

## 2024-08-23 NOTE — TELEPHONE ENCOUNTER
Is Fanta due for this?  Her med list has her taking 1/2 tab daily and this request is for 1 tab daily.  What dose is she taking?

## 2024-08-26 ENCOUNTER — TELEPHONE (OUTPATIENT)
Dept: FAMILY MEDICINE CLINIC | Age: 59
End: 2024-08-26

## 2024-08-26 NOTE — TELEPHONE ENCOUNTER
Vi from [facility] called wanting to see if she can fill the patients medication of LOSARTAN FOR 45 TABLETS/3 MONTHS BECAUSE IT IS CHEAPER and verify that the dosage should be 1/2 tablet     Vi can be reached at [phone] #1    Please Advise

## 2024-08-27 DIAGNOSIS — I10 HYPERTENSION, ESSENTIAL: ICD-10-CM

## 2024-08-27 DIAGNOSIS — E78.00 HYPERCHOLESTEREMIA: ICD-10-CM

## 2024-08-27 LAB
ALBUMIN SERPL-MCNC: 4.4 G/DL (ref 3.4–5)
ALBUMIN/GLOB SERPL: 1.7 {RATIO} (ref 1.1–2.2)
ALP SERPL-CCNC: 83 U/L (ref 40–129)
ALT SERPL-CCNC: 23 U/L (ref 10–40)
ANION GAP SERPL CALCULATED.3IONS-SCNC: 11 MMOL/L (ref 3–16)
AST SERPL-CCNC: 34 U/L (ref 15–37)
BILIRUB SERPL-MCNC: <0.2 MG/DL (ref 0–1)
BUN SERPL-MCNC: 19 MG/DL (ref 7–20)
CALCIUM SERPL-MCNC: 9.7 MG/DL (ref 8.3–10.6)
CHLORIDE SERPL-SCNC: 97 MMOL/L (ref 99–110)
CO2 SERPL-SCNC: 27 MMOL/L (ref 21–32)
CREAT SERPL-MCNC: 1 MG/DL (ref 0.6–1.1)
GFR SERPLBLD CREATININE-BSD FMLA CKD-EPI: 65 ML/MIN/{1.73_M2}
GLUCOSE SERPL-MCNC: 90 MG/DL (ref 70–99)
POTASSIUM SERPL-SCNC: 4.8 MMOL/L (ref 3.5–5.1)
PROT SERPL-MCNC: 7 G/DL (ref 6.4–8.2)
SODIUM SERPL-SCNC: 135 MMOL/L (ref 136–145)

## 2024-08-29 ENCOUNTER — TELEPHONE (OUTPATIENT)
Dept: ADMINISTRATIVE | Age: 59
End: 2024-08-29

## 2024-08-29 DIAGNOSIS — F41.9 ANXIETY: ICD-10-CM

## 2024-08-29 RX ORDER — BUPROPION HYDROCHLORIDE 150 MG/1
150 TABLET ORAL EVERY MORNING
Qty: 90 TABLET | Refills: 1 | Status: SHIPPED | OUTPATIENT
Start: 2024-08-29

## 2024-08-29 NOTE — TELEPHONE ENCOUNTER
Submitted PA for Veozah 45MG tablets   Via [facility] Key: [id number] STATUS: PENDING.    Follow up done daily; if no decision with in three days we will refax.  If another three days goes by with no decision will call the insurance for status.

## 2024-08-30 NOTE — TELEPHONE ENCOUNTER
I called to check status.it was denied due to clinicals not received.. [facility] didn't give me an option to fax anything and the plan didn't ask for it. Kaitlin is faxing the denial letter. Denial letter is uploaded in media.     An Appeal has been faxed in for Veozah. I also faxed blookwork with clinicals. Appeals normally take 30 days to come back with a decision, but follow up will be done every 10 days.    If no response by the 30th day, then the insurance will be called to check status.    If this requires a response please respond to the pool ( P MHCX PSC MEDICATION PRE-AUTH).      Thank you please advise patient.

## 2024-09-03 DIAGNOSIS — E03.9 ACQUIRED UNDERACTIVE THYROID: ICD-10-CM

## 2024-09-03 NOTE — TELEPHONE ENCOUNTER
Appeal is APPROVED.    If this requires a response please respond to the pool ( P MHCX PSC MEDICATION PRE-AUTH).      Thank you please advise patient.

## 2024-09-04 RX ORDER — LIOTHYRONINE SODIUM 5 UG/1
TABLET ORAL
Qty: 180 TABLET | Refills: 1 | Status: SHIPPED | OUTPATIENT
Start: 2024-09-04

## 2024-11-21 ENCOUNTER — OFFICE VISIT (OUTPATIENT)
Dept: FAMILY MEDICINE CLINIC | Age: 59
End: 2024-11-21
Payer: COMMERCIAL

## 2024-11-21 VITALS
HEART RATE: 101 BPM | HEIGHT: 64 IN | TEMPERATURE: 98.2 F | RESPIRATION RATE: 18 BRPM | DIASTOLIC BLOOD PRESSURE: 84 MMHG | BODY MASS INDEX: 29.4 KG/M2 | OXYGEN SATURATION: 96 % | SYSTOLIC BLOOD PRESSURE: 132 MMHG | WEIGHT: 172.2 LBS

## 2024-11-21 DIAGNOSIS — J02.9 SORE THROAT: Primary | ICD-10-CM

## 2024-11-21 LAB — S PYO AG THROAT QL: NORMAL

## 2024-11-21 PROCEDURE — 3079F DIAST BP 80-89 MM HG: CPT | Performed by: NURSE PRACTITIONER

## 2024-11-21 PROCEDURE — 87880 STREP A ASSAY W/OPTIC: CPT | Performed by: NURSE PRACTITIONER

## 2024-11-21 PROCEDURE — 3075F SYST BP GE 130 - 139MM HG: CPT | Performed by: NURSE PRACTITIONER

## 2024-11-21 PROCEDURE — 99213 OFFICE O/P EST LOW 20 MIN: CPT | Performed by: NURSE PRACTITIONER

## 2024-11-21 ASSESSMENT — ENCOUNTER SYMPTOMS
ABDOMINAL PAIN: 0
WHEEZING: 0
SHORTNESS OF BREATH: 0
SORE THROAT: 1
RHINORRHEA: 1
COUGH: 0

## 2024-11-21 NOTE — PROGRESS NOTES
11/21/2024    This is a 59 y.o. female   Chief Complaint   Patient presents with    Pharyngitis     Started Sunday evening.  Scratchy throat.  Possible fever.  Body ache. Fatigue.  White spots.  Has positive exposure to strep   .    Pharyngitis  This is a new problem. The current episode started in the past 7 days (11/17/24). The problem has been gradually improving. Associated symptoms include fatigue, headaches and a sore throat. Pertinent negatives include no abdominal pain, chest pain, congestion, coughing or fever. The symptoms are aggravated by swallowing. She has tried NSAIDs for the symptoms. The treatment provided moderate relief.     Has had sore scratchy throat   Denies fever but has felt warmer than normal. Has had slight muscle aches.   Was exposed to someone who was dx with strep throat.        Patient Active Problem List   Diagnosis    Hypercholesteremia due to HDL > 100    Family history of Alzheimer's disease    Psoriasis    Acquired underactive thyroid    Chronic allergic rhinitis    Vasomotor rhinitis    History of colonic diverticulitis    Anxiety    Hypertension, essential    Psoriatic arthritis (HCC)- mild, monitoring    Menopausal vasomotor syndrome       Current Outpatient Medications   Medication Sig Dispense Refill    liothyronine (CYTOMEL) 5 MCG tablet TAKE TWO TABLETS BY MOUTH ONCE A  tablet 1    buPROPion (WELLBUTRIN XL) 150 MG extended release tablet TAKE ONE TABLET BY MOUTH IN THE MORNING 90 tablet 1    losartan (COZAAR) 50 MG tablet Take 0.5 tablets by mouth daily 30 tablet 2    escitalopram (LEXAPRO) 10 MG tablet Take 1 tablet by mouth daily 30 tablet 0    fezolinetant (VEOZAH) 45 MG TABS Take 1 tablet by mouth daily 90 tablet 3    Semaglutide,0.25 or 0.5MG/DOS, 2 MG/1.5ML SOPN Inject 0.5 mg into the skin every 14 days      hydroCHLOROthiazide (HYDRODIURIL) 25 MG tablet TAKE ONE TABLET BY MOUTH EVERY MORNING 90 tablet 1    ipratropium (ATROVENT) 0.03 % nasal spray USE 2 SPRAYS

## 2024-11-24 LAB
BACTERIA THROAT AEROBE CULT: ABNORMAL
BACTERIA THROAT AEROBE CULT: ABNORMAL
ORGANISM: ABNORMAL

## 2024-11-25 RX ORDER — CEFDINIR 300 MG/1
300 CAPSULE ORAL 2 TIMES DAILY
Qty: 20 CAPSULE | Refills: 0 | Status: SHIPPED | OUTPATIENT
Start: 2024-11-25 | End: 2024-12-05

## 2024-12-02 DIAGNOSIS — I10 HYPERTENSION, ESSENTIAL: ICD-10-CM

## 2024-12-02 RX ORDER — HYDROCHLOROTHIAZIDE 25 MG/1
25 TABLET ORAL EVERY MORNING
Qty: 90 TABLET | Refills: 1 | Status: SHIPPED | OUTPATIENT
Start: 2024-12-02

## 2025-01-07 ASSESSMENT — PATIENT HEALTH QUESTIONNAIRE - PHQ9
SUM OF ALL RESPONSES TO PHQ9 QUESTIONS 1 & 2: 0
SUM OF ALL RESPONSES TO PHQ9 QUESTIONS 1 & 2: 0
1. LITTLE INTEREST OR PLEASURE IN DOING THINGS: NOT AT ALL
SUM OF ALL RESPONSES TO PHQ QUESTIONS 1-9: 0
2. FEELING DOWN, DEPRESSED OR HOPELESS: NOT AT ALL
SUM OF ALL RESPONSES TO PHQ QUESTIONS 1-9: 0
SUM OF ALL RESPONSES TO PHQ QUESTIONS 1-9: 0
1. LITTLE INTEREST OR PLEASURE IN DOING THINGS: NOT AT ALL
SUM OF ALL RESPONSES TO PHQ QUESTIONS 1-9: 0
2. FEELING DOWN, DEPRESSED OR HOPELESS: NOT AT ALL

## 2025-01-09 ENCOUNTER — OFFICE VISIT (OUTPATIENT)
Dept: FAMILY MEDICINE CLINIC | Age: 60
End: 2025-01-09
Payer: COMMERCIAL

## 2025-01-09 VITALS
DIASTOLIC BLOOD PRESSURE: 80 MMHG | HEART RATE: 93 BPM | BODY MASS INDEX: 29.71 KG/M2 | SYSTOLIC BLOOD PRESSURE: 122 MMHG | WEIGHT: 174 LBS | HEIGHT: 64 IN | RESPIRATION RATE: 16 BRPM | OXYGEN SATURATION: 98 %

## 2025-01-09 DIAGNOSIS — F41.9 ANXIETY: ICD-10-CM

## 2025-01-09 DIAGNOSIS — N95.1 MENOPAUSAL VASOMOTOR SYNDROME: Primary | ICD-10-CM

## 2025-01-09 PROCEDURE — 3079F DIAST BP 80-89 MM HG: CPT | Performed by: FAMILY MEDICINE

## 2025-01-09 PROCEDURE — 99214 OFFICE O/P EST MOD 30 MIN: CPT | Performed by: FAMILY MEDICINE

## 2025-01-09 PROCEDURE — 3074F SYST BP LT 130 MM HG: CPT | Performed by: FAMILY MEDICINE

## 2025-01-09 PROCEDURE — G2211 COMPLEX E/M VISIT ADD ON: HCPCS | Performed by: FAMILY MEDICINE

## 2025-01-09 RX ORDER — BUPROPION HYDROCHLORIDE 300 MG/1
300 TABLET ORAL EVERY MORNING
Qty: 30 TABLET | Refills: 1 | Status: SHIPPED | OUTPATIENT
Start: 2025-01-09

## 2025-01-09 SDOH — ECONOMIC STABILITY: FOOD INSECURITY: WITHIN THE PAST 12 MONTHS, THE FOOD YOU BOUGHT JUST DIDN'T LAST AND YOU DIDN'T HAVE MONEY TO GET MORE.: NEVER TRUE

## 2025-01-09 SDOH — ECONOMIC STABILITY: FOOD INSECURITY: WITHIN THE PAST 12 MONTHS, YOU WORRIED THAT YOUR FOOD WOULD RUN OUT BEFORE YOU GOT MONEY TO BUY MORE.: NEVER TRUE

## 2025-01-09 NOTE — PATIENT INSTRUCTIONS
uses the same motions over and over, like walking or swimming. You can meditate by practicing relaxation training, by stretching or by breathing deeply.  Relaxation training is simple. Start with one muscle. Hold it tight for a few seconds then relax the muscle. Do this with each of your muscles, beginning with the toes and feet and working your way up through the rest of your body, one muscle group at a time.  Stretching can also help relieve tension. Roll your head in a gentle [place]. Reach toward the ceiling and bend side to side slowly. Roll your shoulders.  Deep, relaxed breathing by itself may help relieve stress (see the box to the right). This helps you get plenty of oxygen and activates the relaxation response, the body’s antidote to stress.

## 2025-01-09 NOTE — PROGRESS NOTES
morning Yes Jenifer Ocampo MD   hydroCHLOROthiazide (HYDRODIURIL) 25 MG tablet TAKE ONE TABLET BY MOUTH IN THE MORNING Yes Jenifer Ocampo MD   liothyronine (CYTOMEL) 5 MCG tablet TAKE TWO TABLETS BY MOUTH ONCE A DAY Yes Jenifer Ocampo MD   losartan (COZAAR) 50 MG tablet Take 0.5 tablets by mouth daily Yes Surekha Chaves APRN - CNP   fezolinetant (VEOZAH) 45 MG TABS Take 1 tablet by mouth daily Yes Jenifer Ocampo MD   Semaglutide,0.25 or 0.5MG/DOS, 2 MG/1.5ML SOPN Inject 0.5 mg into the skin every 14 days Yes Jenifer Ocampo MD   ipratropium (ATROVENT) 0.03 % nasal spray USE 2 SPRAYS IN EACH NOSTRIL DAILY AS NEEDED FOR NASAL DRAINAGE Yes Surekha Chaves APRN - CNP   Multiple Vitamin (MULTIVITAMINS PO) Take 1 tablet by mouth daily Yes Shobha Leach MD   Ascorbic Acid (VITAMIN C) 500 MG tablet Take 1 tablet by mouth daily Yes Shobha Leach MD   magnesium 30 MG tablet Take 400 mg by mouth daily  Yes Shobha Leach MD   zinc gluconate 50 MG tablet Take 1 tablet by mouth daily Yes Shobha Leach MD      Social History     Tobacco Use    Smoking status: Never     Passive exposure: Never    Smokeless tobacco: Never    Tobacco comments:     congrats   Vaping Use    Vaping status: Never Used   Substance Use Topics    Alcohol use: Yes     Alcohol/week: 5.0 standard drinks of alcohol     Types: 5 Standard drinks or equivalent per week     Comment: socially    Drug use: Never      LAST LABS  Cholesterol, Total   Date Value Ref Range Status   06/05/2024 251 (H) 0 - 199 mg/dL Final     LDL Cholesterol   Date Value Ref Range Status   06/05/2024 145 (H) <100 mg/dL Final     LDL Calculated   Date Value Ref Range Status   11/13/2023 159 (H) <100 mg/dL Final     HDL   Date Value Ref Range Status   06/05/2024 82 (H) 40 - 60 mg/dL Final     Comment:     An HDL cholesterol less than 40 mg/dL is low and  constitutes a coronary heart disease risk factor.  An HDL cholesterol greater than 60 mg/dL is

## 2025-01-23 ENCOUNTER — PATIENT MESSAGE (OUTPATIENT)
Dept: FAMILY MEDICINE CLINIC | Age: 60
End: 2025-01-23

## 2025-01-23 RX ORDER — SCOPOLAMINE 1 MG/3D
1 PATCH, EXTENDED RELEASE TRANSDERMAL
Qty: 10 PATCH | Refills: 0 | Status: SHIPPED | OUTPATIENT
Start: 2025-01-23 | End: 2026-01-23

## 2025-03-11 DIAGNOSIS — I10 HYPERTENSION, ESSENTIAL: ICD-10-CM

## 2025-03-11 DIAGNOSIS — E03.9 ACQUIRED UNDERACTIVE THYROID: ICD-10-CM

## 2025-03-11 RX ORDER — LOSARTAN POTASSIUM 50 MG/1
TABLET ORAL
Qty: 45 TABLET | Refills: 1 | Status: SHIPPED | OUTPATIENT
Start: 2025-03-11

## 2025-03-11 RX ORDER — LIOTHYRONINE SODIUM 5 UG/1
TABLET ORAL
Qty: 180 TABLET | Refills: 1 | Status: SHIPPED | OUTPATIENT
Start: 2025-03-11

## 2025-03-11 RX ORDER — HYDROCHLOROTHIAZIDE 25 MG/1
25 TABLET ORAL EVERY MORNING
Qty: 90 TABLET | Refills: 1 | Status: SHIPPED | OUTPATIENT
Start: 2025-03-11

## 2025-04-09 NOTE — PROGRESS NOTES
[facility] PRE-OPERATIVE INSTRUCTIONS    Day of Procedure:4/2/25                Arrival time:   0800             Surgery time:0930    Take the following medications with a sip of water:  Follow your MD/Surgeons pre-procedure instructions regarding your medications     Do not eat or drink anything after 12:00 midnight prior to your surgery.  This includes water, chewing gum, mints and ice chips.   You may brush your teeth and gargle the morning of your surgery, but do not swallow the water.     Please see your family doctor/pediatrician for a history and physical and/or concerning medications.   Bring any test results/reports from your physicians office.   If you are under the care of a heart doctor or specialist doctor, please be aware that you may be asked to see them for clearance.    You may be asked to stop blood thinners such as Coumadin, Plavix, Fragmin, Lovenox, etc., or any anti-inflammatories such as:  Aspirin, Ibuprofen, Advil, Naproxen prior to your surgery.    We also ask that you stop any over the counter medications such as fish oil, vitamin E, glucosamine, garlic, Multivitamins, COQ 10, etc.    We ask that you do not smoke 24 hours prior to surgery.  We ask that you do not  drink any alcoholic beverages 24 hours prior to surgery     You must make arrangements for a responsible adult to take you home after your surgery.    For your safety, you will not be allowed to leave alone or drive yourself home.  Your surgery will be cancelled if you do not have a ride home.     Also for your safety, you must have someone stay with you the first 24 hours after your surgery.     A parent or legal guardian must accompany a child scheduled for surgery and plan to stay at the hospital until the child is discharged.    Please do not bring other children with you.    For your comfort, please wear simple loose fitting clothing to the hospital.  Please do not bring valuables.    Do not wear any make-up on

## 2025-04-19 ENCOUNTER — PATIENT MESSAGE (OUTPATIENT)
Dept: FAMILY MEDICINE CLINIC | Age: 60
End: 2025-04-19

## 2025-04-19 DIAGNOSIS — Z78.0 MENOPAUSE: ICD-10-CM

## 2025-04-19 DIAGNOSIS — F41.9 ANXIETY: ICD-10-CM

## 2025-04-19 DIAGNOSIS — R23.2 HOT FLASHES: ICD-10-CM

## 2025-04-21 ENCOUNTER — RESULTS FOLLOW-UP (OUTPATIENT)
Dept: FAMILY MEDICINE CLINIC | Age: 60
End: 2025-04-21

## 2025-04-21 ENCOUNTER — HOSPITAL ENCOUNTER (OUTPATIENT)
Dept: WOMENS IMAGING | Age: 60
Discharge: HOME | End: 2025-04-21
Payer: COMMERCIAL

## 2025-04-21 VITALS — HEIGHT: 64 IN | BODY MASS INDEX: 29.88 KG/M2 | WEIGHT: 175 LBS

## 2025-04-21 DIAGNOSIS — Z12.31 VISIT FOR SCREENING MAMMOGRAM: ICD-10-CM

## 2025-04-21 PROCEDURE — 77063 BREAST TOMOSYNTHESIS BI: CPT

## 2025-04-21 RX ORDER — FEZOLINETANT 45 MG/1
1 TABLET, FILM COATED ORAL DAILY
Qty: 90 TABLET | Refills: 3 | Status: SHIPPED | OUTPATIENT
Start: 2025-04-21

## 2025-04-21 RX ORDER — BUPROPION HYDROCHLORIDE 300 MG/1
300 TABLET ORAL EVERY MORNING
Qty: 90 TABLET | Refills: 1 | Status: SHIPPED | OUTPATIENT
Start: 2025-04-21

## 2025-04-25 ENCOUNTER — ANESTHESIA EVENT (OUTPATIENT)
Dept: ENDOSCOPY | Age: 60
End: 2025-04-25
Payer: COMMERCIAL

## 2025-04-28 ENCOUNTER — ANESTHESIA (OUTPATIENT)
Dept: ENDOSCOPY | Age: 60
End: 2025-04-28
Payer: COMMERCIAL

## 2025-04-28 ENCOUNTER — HOSPITAL ENCOUNTER (OUTPATIENT)
Age: 60
Setting detail: OUTPATIENT SURGERY
Discharge: HOME | End: 2025-04-28
Attending: INTERNAL MEDICINE | Admitting: INTERNAL MEDICINE
Payer: COMMERCIAL

## 2025-04-28 ENCOUNTER — HOSPITAL ENCOUNTER (OUTPATIENT)
Dept: ENDOSCOPY | Age: 60
Setting detail: OUTPATIENT SURGERY
Discharge: HOME | End: 2025-04-28
Attending: INTERNAL MEDICINE
Payer: COMMERCIAL

## 2025-04-28 VITALS
SYSTOLIC BLOOD PRESSURE: 132 MMHG | DIASTOLIC BLOOD PRESSURE: 86 MMHG | RESPIRATION RATE: 16 BRPM | OXYGEN SATURATION: 100 % | BODY MASS INDEX: 29.02 KG/M2 | HEIGHT: 64 IN | TEMPERATURE: 97.7 F | WEIGHT: 170 LBS | HEART RATE: 67 BPM

## 2025-04-28 PROCEDURE — 3609027000 HC COLONOSCOPY: Performed by: INTERNAL MEDICINE

## 2025-04-28 PROCEDURE — 7100000000 HC PACU RECOVERY - FIRST 15 MIN: Performed by: INTERNAL MEDICINE

## 2025-04-28 PROCEDURE — 7100000010 HC PHASE II RECOVERY - FIRST 15 MIN: Performed by: INTERNAL MEDICINE

## 2025-04-28 PROCEDURE — 2709999900 HC NON-CHARGEABLE SUPPLY: Performed by: INTERNAL MEDICINE

## 2025-04-28 PROCEDURE — 3700000000 HC ANESTHESIA ATTENDED CARE: Performed by: INTERNAL MEDICINE

## 2025-04-28 PROCEDURE — 3700000001 HC ADD 15 MINUTES (ANESTHESIA): Performed by: INTERNAL MEDICINE

## 2025-04-28 PROCEDURE — 7100000011 HC PHASE II RECOVERY - ADDTL 15 MIN: Performed by: INTERNAL MEDICINE

## 2025-04-28 PROCEDURE — 2580000003 HC RX 258: Performed by: ANESTHESIOLOGY

## 2025-04-28 PROCEDURE — 7100000001 HC PACU RECOVERY - ADDTL 15 MIN: Performed by: INTERNAL MEDICINE

## 2025-04-28 PROCEDURE — 6360000002 HC RX W HCPCS

## 2025-04-28 RX ORDER — NALOXONE HYDROCHLORIDE 0.4 MG/ML
INJECTION, SOLUTION INTRAMUSCULAR; INTRAVENOUS; SUBCUTANEOUS PRN
Status: DISCONTINUED | OUTPATIENT
Start: 2025-04-28 | End: 2025-04-28 | Stop reason: HOSPADM

## 2025-04-28 RX ORDER — SODIUM CHLORIDE 9 MG/ML
INJECTION, SOLUTION INTRAVENOUS PRN
Status: DISCONTINUED | OUTPATIENT
Start: 2025-04-28 | End: 2025-04-28 | Stop reason: HOSPADM

## 2025-04-28 RX ORDER — DIPHENHYDRAMINE HYDROCHLORIDE 50 MG/ML
12.5 INJECTION, SOLUTION INTRAMUSCULAR; INTRAVENOUS
Status: DISCONTINUED | OUTPATIENT
Start: 2025-04-28 | End: 2025-04-28 | Stop reason: HOSPADM

## 2025-04-28 RX ORDER — SODIUM CHLORIDE 0.9 % (FLUSH) 0.9 %
5-40 SYRINGE (ML) INJECTION EVERY 12 HOURS SCHEDULED
Status: DISCONTINUED | OUTPATIENT
Start: 2025-04-28 | End: 2025-04-28 | Stop reason: HOSPADM

## 2025-04-28 RX ORDER — SODIUM CHLORIDE 0.9 % (FLUSH) 0.9 %
5-40 SYRINGE (ML) INJECTION PRN
Status: DISCONTINUED | OUTPATIENT
Start: 2025-04-28 | End: 2025-04-28 | Stop reason: HOSPADM

## 2025-04-28 RX ORDER — LIDOCAINE HYDROCHLORIDE 20 MG/ML
INJECTION, SOLUTION INFILTRATION; PERINEURAL
Status: DISCONTINUED | OUTPATIENT
Start: 2025-04-28 | End: 2025-04-28 | Stop reason: SDUPTHER

## 2025-04-28 RX ORDER — PROPOFOL 10 MG/ML
INJECTION, EMULSION INTRAVENOUS
Status: DISCONTINUED | OUTPATIENT
Start: 2025-04-28 | End: 2025-04-28 | Stop reason: SDUPTHER

## 2025-04-28 RX ORDER — ONDANSETRON 2 MG/ML
4 INJECTION INTRAMUSCULAR; INTRAVENOUS
Status: DISCONTINUED | OUTPATIENT
Start: 2025-04-28 | End: 2025-04-28 | Stop reason: HOSPADM

## 2025-04-28 RX ADMIN — PROPOFOL 70 MG: 10 INJECTION, EMULSION INTRAVENOUS at 08:59

## 2025-04-28 RX ADMIN — LIDOCAINE HYDROCHLORIDE 50 MG: 20 INJECTION, SOLUTION INFILTRATION; PERINEURAL at 08:59

## 2025-04-28 RX ADMIN — PROPOFOL 160 MCG/KG/MIN: 10 INJECTION, EMULSION INTRAVENOUS at 09:00

## 2025-04-28 RX ADMIN — SODIUM CHLORIDE: 9 INJECTION, SOLUTION INTRAVENOUS at 08:55

## 2025-04-28 ASSESSMENT — PAIN SCALES - GENERAL: PAINLEVEL_OUTOF10: 0

## 2025-04-28 ASSESSMENT — PAIN - FUNCTIONAL ASSESSMENT: PAIN_FUNCTIONAL_ASSESSMENT: NONE - DENIES PAIN

## 2025-04-28 NOTE — H&P
Admission medications    Medication Sig Start Date End Date Taking? Authorizing Provider   buPROPion (WELLBUTRIN XL) 300 MG extended release tablet Take 1 tablet by mouth every morning 4/21/25   Jenifer Ocampo MD   fezolinetant (VEOZAH) 45 MG TABS Take 1 tablet by mouth daily 4/21/25   Jenifer Ocampo MD   liothyronine (CYTOMEL) 5 MCG tablet TAKE TWO TABLETS BY MOUTH ONCE A DAY 3/11/25   Surekha Chaves APRN - CNP   losartan (COZAAR) 50 MG tablet TAKE ONE-HALF TABLET BY MOUTH ONCE A DAY 3/11/25   Surekha Chaves APRN - CNP   hydroCHLOROthiazide (HYDRODIURIL) 25 MG tablet TAKE ONE TABLET BY MOUTH EVERY MORNING 3/11/25   Surekha Chaves APRN - CNP   scopolamine (TRANSDERM-SCOP, 1.5 MG,) transdermal patch Place 1 patch onto the skin every 72 hours Apply to behind ear starting 4 hours before travel. 1/23/25 1/23/26  Jenifer Ocampo MD   Semaglutide,0.25 or 0.5MG/DOS, 2 MG/1.5ML SOPN Inject 0.5 mg into the skin every 14 days Hold 7 days prior to procedure 6/27/24   Jenifer Ocampo MD   ipratropium (ATROVENT) 0.03 % nasal spray USE 2 SPRAYS IN EACH NOSTRIL DAILY AS NEEDED FOR NASAL DRAINAGE 12/4/23   Surekha Chaves APRN - CNP   Multiple Vitamin (MULTIVITAMINS PO) Take 1 tablet by mouth daily    Shobha Leach MD   Ascorbic Acid (VITAMIN C) 500 MG tablet Take 1 tablet by mouth daily    Shobha Leach MD   magnesium 30 MG tablet Take 400 mg by mouth daily     Shobha Leach MD   zinc gluconate 50 MG tablet Take 1 tablet by mouth daily    Shobha Leach MD       Allergies:  Penicillins, Other, and Nickel    Social History:   TOBACCO:   reports that she has never smoked. She has never been exposed to tobacco smoke. She has never used smokeless tobacco.  ETOH:   reports current alcohol use of about 5.0 standard drinks of alcohol per week.  DRUGS:   reports no history of drug use.    PHYSICAL EXAM:      Vital Signs: Ht 1.626 m (5' 4\")   Wt 77.1 kg (170 lb)   LMP 12/15/2015   BMI 29.18 kg/m²

## 2025-04-28 NOTE — DISCHARGE INSTRUCTIONS
usually the next day after the procedure.   Medications - When taking medications, it's important to:   Take your medication as directed, not more, not less, not at a different time.   Do not stop taking them without consulting your healthcare provider.   Don't share them with anyone else.   Know what effects and side effects to expect, and report them to your healthcare provider.   If you are taking more than one drug, even if it is an over-the-counter medication, herb, or dietary supplement, be sure to check with a physician or pharmacist about drug interactions.   Plan ahead for refills so you don't run out.   Lifestyle Changes - The results of your colonoscopy will determine if any lifestyle changes are necessary.     Follow-up:  The doctor will usually give you a preliminary report after the medication wears off and you are more alert. The results from a biopsy can take as long as 1-2 weeks to be completed.   Schedule a follow-up appointment as directed by your doctor.   You should schedule a follow-up colonoscopy as recommended by your doctor.     Call Your Doctor If Any of the Following Occurs:  Bleeding from your rectum; notify your doctor if you pass a teaspoonful or more of blood   Black, tarry stools   Severe abdominal pain   Hard, swollen abdomen   Signs of infection, including fever or chills   Inability to pass gas or stool   Coughing, shortness of breath, chest pain, severe nausea or vomiting     In case of an emergency, call 911 immediately.

## 2025-04-28 NOTE — ANESTHESIA PRE PROCEDURE
[facility] Department of Anesthesiology  Pre-Anesthesia Evaluation/Consultation       Name:  Fanta Caldwell  : 1965  Age:  60 y.o.                                           MRN:  [id number]  Date: 2025           Surgeon: Surgeon(s):  Jerald Sanders MD    Procedure: Procedure(s):  COLONOSCOPY     Allergies   Allergen Reactions    Penicillins Hives    Other Itching     thermersol-preservative for contact solution-pt states got itchy eyes    Nickel Rash     Patient Active Problem List   Diagnosis    Hypercholesteremia due to HDL > 100    Family history of Alzheimer's disease    Psoriasis    Acquired underactive thyroid    Chronic allergic rhinitis    Vasomotor rhinitis    History of colonic diverticulitis    Anxiety    Hypertension, essential    Psoriatic arthritis (HCC)- mild, monitoring    Menopausal vasomotor syndrome     Past Medical History:   Diagnosis Date    Abnormal Pap smear of cervix     Acquired underactive thyroid 2017    Asthma     when have colds goes into asthma    Chronic allergic rhinitis 2018    Diverticulitis 2006    colon 2006    Family history of Alzheimer's disease 11/15/2016    Fibrocystic breast     History of diverticulitis 10/04/2020    Hypercholesteremia due to HDL > 100 10/17/2013    no meds    Hypertension     Irregular uterine bleeding     Left hand pain 10/2022    Menopause ovarian failure     Primary osteoarthritis of first carpometacarpal joint of left hand 2015    Psoriatic arthritis (HCC) 2022     Past Surgical History:   Procedure Laterality Date    BREAST BIOPSY      COLONOSCOPY      x2    COLPOSCOPY      Dr. Coelho     COSMETIC SURGERY      laser liposuction-abdomen and thighs    EYE SURGERY      corrective    FINGER TRIGGER RELEASE Left 2016    Middle Finger    FINGER TRIGGER RELEASE Right 2022    RIGHT INDEX FINGER, MIDDLE FINGER AND RING FINGER TRIGGER FINGER RELEASE performed by Jose Holm MD at [facility] OR    HAND SURGERY

## 2025-04-28 NOTE — ANESTHESIA POSTPROCEDURE EVALUATION
Department of Anesthesiology  Postprocedure Note    Patient: Fanta Caldwell  MRN: [id number]  YOB: 1965  Date of evaluation: 4/28/2025    Procedure Summary       Date: 04/28/25 Room / Location: [place] [address] / [facility]    Anesthesia Start: 0855 Anesthesia Stop: 0917    Procedure: COLONOSCOPY Diagnosis:       Screen for colon cancer      (Screen for colon cancer [Z12.11])    Surgeons: Jerald Sanders MD Responsible Provider: Hector Shin MD    Anesthesia Type: MAC ASA Status: 2            Anesthesia Type: No value filed.    Natalia Phase I: Natalia Score: 10    Natalia Phase II: Natalia Score: 10    Anesthesia Post Evaluation    Patient location during evaluation: bedside  Patient participation: complete - patient participated  Level of consciousness: awake and alert  Pain score: 0  Nausea & Vomiting: no nausea  Cardiovascular status: hemodynamically stable  Respiratory status: acceptable  Hydration status: stable  Pain management: adequate    No notable events documented.

## 2025-04-28 NOTE — OP NOTE
Colonoscopy Procedure Note      Patient: Fanta Caldwell  : 1965  Acct#:     Procedure: Colonoscopy    Date:  2025    Surgeon:  Jerald Sanders MD    Referring Physician:  Jenifer Ocampo MD    Previous Colonoscopy: YES  Date:  10 years prior  Greater than 3 years: YES    Preoperative Diagnosis:  1. Screening     Postoperative Diagnosis:  1. Left Colon Diverticulosis     Consent:  The patient or their legal guardian has signed a consent, and is aware of the potential risks, benefits, alternatives, and potential complications of this procedure.  These include, but are not limited to hemorrhage, bleeding, post procedural pain, perforation, phlebitis, aspiration, hypotension, hypoxia, cardiovascular events such as arryhthmia, and possibly death.  Additionally, the possibility of missed colonic polyps and interval colon cancer was discussed in the consent.    Anesthesia:  The patient was administered IV propofol per anesthesiology team.  Please see their operative records for full details.      Procedure:   An informed consent was obtained from the patient after explanation of indications, benefits, possible risks and complications of the procedure.  The patient was then taken to the endoscopy suite, placed in the left lateral decubitus position, and the above IV anesthesia was administered.    A digital rectal examination was performed and revealed negative without mass, lesions or tenderness.      The Olympus video colonoscope was placed in the patient's rectum under digital direction and advanced to the cecum. The cecum was identified by characteristic anatomy and ballottment.  The preparation was excellent.  The ileocecal valve was identified.     The scope was then withdrawn back through the cecum, ascending, transverse, descending, sigmoid colon, and rectum.  Careful circumferential examination of the mucosa in these areas demonstrated:    The left colon had some mild diverticulosis.   A

## 2025-04-28 NOTE — PROGRESS NOTES
Pt dressed. Pt tolerates PO. Discharge instructions reviewed with pt and boyfriend. Both express an understanding of instructions. Walked with pt and boyfriend to discharge.

## 2025-04-28 NOTE — PROGRESS NOTES
Pt awake on arrival to phase II. Denies pain at present. VSS. Up to chair. Pt dressing self. Given snack and call light. Boyfriend to room.

## 2025-06-27 ENCOUNTER — OFFICE VISIT (OUTPATIENT)
Dept: FAMILY MEDICINE CLINIC | Age: 60
End: 2025-06-27
Payer: COMMERCIAL

## 2025-06-27 VITALS
SYSTOLIC BLOOD PRESSURE: 116 MMHG | RESPIRATION RATE: 16 BRPM | HEIGHT: 64 IN | WEIGHT: 174.8 LBS | BODY MASS INDEX: 29.84 KG/M2 | HEART RATE: 84 BPM | OXYGEN SATURATION: 96 % | DIASTOLIC BLOOD PRESSURE: 80 MMHG

## 2025-06-27 DIAGNOSIS — I10 HYPERTENSION, ESSENTIAL: ICD-10-CM

## 2025-06-27 DIAGNOSIS — F41.9 ANXIETY: ICD-10-CM

## 2025-06-27 DIAGNOSIS — L40.50 PSORIATIC ARTHRITIS (HCC): ICD-10-CM

## 2025-06-27 DIAGNOSIS — E78.00 HYPERCHOLESTEREMIA: ICD-10-CM

## 2025-06-27 DIAGNOSIS — Z00.00 ENCOUNTER FOR WELL ADULT EXAM WITHOUT ABNORMAL FINDINGS: Primary | ICD-10-CM

## 2025-06-27 DIAGNOSIS — N95.1 MENOPAUSAL VASOMOTOR SYNDROME: ICD-10-CM

## 2025-06-27 LAB
ALBUMIN SERPL-MCNC: 4.3 G/DL (ref 3.4–5)
ALBUMIN/GLOB SERPL: 1.8 {RATIO} (ref 1.1–2.2)
ALP SERPL-CCNC: 82 U/L (ref 40–129)
ALT SERPL-CCNC: 16 U/L (ref 10–40)
ANION GAP SERPL CALCULATED.3IONS-SCNC: 11 MMOL/L (ref 3–16)
AST SERPL-CCNC: 29 U/L (ref 15–37)
BILIRUB SERPL-MCNC: <0.2 MG/DL (ref 0–1)
BUN SERPL-MCNC: 16 MG/DL (ref 7–20)
CALCIUM SERPL-MCNC: 9.4 MG/DL (ref 8.3–10.6)
CHLORIDE SERPL-SCNC: 97 MMOL/L (ref 99–110)
CO2 SERPL-SCNC: 26 MMOL/L (ref 21–32)
CREAT SERPL-MCNC: 0.9 MG/DL (ref 0.6–1.2)
GFR SERPLBLD CREATININE-BSD FMLA CKD-EPI: 73 ML/MIN/{1.73_M2}
GLUCOSE SERPL-MCNC: 113 MG/DL (ref 70–99)
POTASSIUM SERPL-SCNC: 4.1 MMOL/L (ref 3.5–5.1)
PROT SERPL-MCNC: 6.7 G/DL (ref 6.4–8.2)
SODIUM SERPL-SCNC: 134 MMOL/L (ref 136–145)

## 2025-06-27 PROCEDURE — 99396 PREV VISIT EST AGE 40-64: CPT | Performed by: FAMILY MEDICINE

## 2025-06-27 PROCEDURE — 3079F DIAST BP 80-89 MM HG: CPT | Performed by: FAMILY MEDICINE

## 2025-06-27 PROCEDURE — 3074F SYST BP LT 130 MM HG: CPT | Performed by: FAMILY MEDICINE

## 2025-06-27 NOTE — PATIENT INSTRUCTIONS
INSTRUCTIONS  NEXT APPOINTMENT: Please schedule check-up in 6 months.   PLEASE TAKE THIS FORM TO CHECK-OUT WINDOW TO SCHEDULE NEXT VISIT.   PLEASE GET BLOODWORK DRAWN TODAY ON FIRST FLOOR in 170.  Take orders with you.    Get fasting blood work thru work as planned.  Please get annual flu and covid vaccine when available in fall.  Can get at stores such as [employer] and [employer].  Let me know mg of ozempic you are taking per week. Check pricing  Patient Education           Well Visit, Ages 18 to 65: Care Instructions  Well visits can help you stay healthy. Your doctor has checked your overall health and may have suggested ways to take good care of yourself. Your doctor also may have recommended tests. You can help prevent illness with healthy eating, good sleep, vaccinations, regular exercise, and other steps.    Get the tests that you and your doctor decide on. Depending on your age and risks, examples might include screening for diabetes; hepatitis C; HIV; and cervical, breast, lung, and colon cancer. Screening helps find diseases before any symptoms appear.   Eat healthy foods. Choose fruits, vegetables, whole grains, lean protein, and low-fat dairy foods. Limit saturated fat and reduce salt.     Limit alcohol. Men should have no more than 2 drinks a day. Women should have no more than 1. For some people, no alcohol is the best choice.   Exercise. Get at least 30 minutes of exercise on most days of the week. Walking can be a good choice.     Reach and stay at your healthy weight. This will lower your risk for many health problems.   Take care of your mental health. Try to stay connected with friends, family, and community, and find ways to manage stress.     If you're feeling depressed or hopeless, talk to someone. A counselor can help. If you don't have a counselor, talk to your doctor.   Talk to your doctor if you think you may have a problem with alcohol or drug use. This includes prescription medicines,

## 2025-06-27 NOTE — PROGRESS NOTES
PHYSICAL-VISIT NOTE   Assessment and Plan:      Diagnosis Orders   1. Encounter for well adult exam without abnormal findings        2. Hypertension, essential  Comprehensive Metabolic Panel      3. Psoriatic arthritis (HCC)        4. Hypercholesteremia due to HDL > 100  Comprehensive Metabolic Panel      5. Menopausal vasomotor syndrome        6. Anxiety          Plan as above and below.    INSTRUCTIONS  NEXT APPOINTMENT: Please schedule check-up in 6 months.   PLEASE TAKE THIS FORM TO CHECK-OUT WINDOW TO SCHEDULE NEXT VISIT.   PLEASE GET BLOODWORK DRAWN TODAY ON FIRST FLOOR in 170.  Take orders with you.    Get fasting blood work thru work as planned.  Please get annual flu and covid vaccine when available in fall.  Can get at stores such as [employer] and [employer].  Let me know mg of ozempic you are taking per week. Check pricing     Subjective:     Chief Complaint   Patient presents with    Annual Exam     No concerns        Fanta Caldwell is a 60 y.o. female who presents for annual testing/preventive review and check-up of medical problems listed below:  1. Encounter for well adult exam without abnormal findings    2. Hypertension, essential    3. Psoriatic arthritis (HCC)    4. Hypercholesteremia due to HDL > 100    5. Menopausal vasomotor syndrome    6. Anxiety        Complaints:   None    ROS-See scanned \"Annual Adult Health Checklist\". Pertinent positives addressed above.    CHART REVIEW  Health Maintenance Due   Topic Date Due    COVID-19 Vaccine (5 - 2024-25 season) 09/01/2024     Social History     Tobacco Use    Smoking status: Never     Passive exposure: Never    Smokeless tobacco: Never    Tobacco comments:     congrats   Vaping Use    Vaping status: Never Used   Substance Use Topics    Alcohol use: Yes     Alcohol/week: 5.0 standard drinks of alcohol     Types: 5 Standard drinks or equivalent per week     Comment: socially    Drug use: Never      Current Outpatient Medications   Medication Instructions

## 2025-07-01 ENCOUNTER — RESULTS FOLLOW-UP (OUTPATIENT)
Dept: FAMILY MEDICINE CLINIC | Age: 60
End: 2025-07-01

## 2025-07-01 LAB
CHOLEST SERPL-MCNC: 239 MG/DL (ref 0–199)
GLUCOSE SERPL-MCNC: 98 MG/DL (ref 70–99)
HDLC SERPL-MCNC: 67 MG/DL (ref 40–60)
LDLC SERPL CALC-MCNC: 135 MG/DL
TRIGL SERPL-MCNC: 187 MG/DL (ref 0–150)

## 2025-07-31 DIAGNOSIS — J30.0 VASOMOTOR RHINITIS: ICD-10-CM

## 2025-07-31 RX ORDER — IPRATROPIUM BROMIDE 21 UG/1
SPRAY, METERED NASAL
Qty: 30 ML | Refills: 4 | Status: SHIPPED | OUTPATIENT
Start: 2025-07-31

## (undated) DEVICE — COVER LT HNDL BLU PLAS

## (undated) DEVICE — SPONGE LAP W18XL18IN WHT COT 4 PLY FLD STRUNG RADPQ DISP ST

## (undated) DEVICE — SPONGE GZ W4XL4IN COT 12 PLY TYP VII WVN C FLD DSGN

## (undated) DEVICE — AGENT HEMSTAT W2XL3IN OXIDIZED REGENERATED CELOS ABSRB

## (undated) DEVICE — ENDOSCOPY KIT: Brand: MEDLINE INDUSTRIES, INC.

## (undated) DEVICE — BLADE 1882940HR 5PK M4 INF TURB 2.9MM: Brand: STRAIGHTSHOT

## (undated) DEVICE — Device

## (undated) DEVICE — SYRINGE MED 10ML LUERLOCK TIP W/O SFTY DISP

## (undated) DEVICE — CLEANER,CAUTERY TIP,2X2",STERILE: Brand: MEDLINE

## (undated) DEVICE — SOLUTION,SALINE,IRRGATION,500ML,STRL: Brand: MEDLINE

## (undated) DEVICE — WRAP COHESIVE W2INXL5YD TAN SELF ADH BNDG HND NON STERILE TEAR CARING

## (undated) DEVICE — BANDAGE COMPR W2INXL5YD TAN BRTH SELF ADH WRP W/ HND TEAR

## (undated) DEVICE — GLOVE SURG SZ 65 CRM LTX FREE POLYISOPRENE POLYMER BEAD ANTI

## (undated) DEVICE — ELECTRODE BLDE L6.5IN CAUT EXT DISP

## (undated) DEVICE — GOWN SIRUS NONREIN XL W/TWL: Brand: MEDLINE INDUSTRIES, INC.

## (undated) DEVICE — WILLIS PACK: Brand: MEDLINE INDUSTRIES, INC.

## (undated) DEVICE — GLOVE SURG SZ 75 CRM LTX FREE POLYISOPRENE POLYMER BEAD ANTI

## (undated) DEVICE — STAPLER INT L75MM CUT LN L73MM STPL LN L77MM BLU B FRM 8

## (undated) DEVICE — TOWEL,OR,DSP,ST,BLUE,STD,4/PK,20PK/CS: Brand: MEDLINE

## (undated) DEVICE — SUTURE 4-0 L18IN ABSRB BRN PS-4 L16MM 1/2 CIR PRIM REV CUT 1643G

## (undated) DEVICE — STAPLER INT L28CM DIA29MM CLS STPL H10-2.5MM OPN LEG L5.5MM

## (undated) DEVICE — APPLICATOR MEDICATED 26 CC SOLUTION HI LT ORNG CHLORAPREP

## (undated) DEVICE — SUTURE ABSORBABLE MONOFILAMENT 4-0 SC-1 18 IN PLN GUT 1824H

## (undated) DEVICE — SPLINT 1524050 5PK PAIR DOYLE II AIRWAY: Brand: DOYLE II ™

## (undated) DEVICE — MERCY HEALTH WEST TURNOVER: Brand: MEDLINE INDUSTRIES, INC.

## (undated) DEVICE — SUTURE CHROMIC GUT SZ 5-0 L18IN ABSRB BRN P-3 L13MM 3/8 CIR 687G

## (undated) DEVICE — GARMENT COMPR STD FOR 17IN CALF UNIF THER FLOTRN

## (undated) DEVICE — STRIP,CLOSURE,WOUND,MEDI-STRIP,1/2X4: Brand: MEDLINE

## (undated) DEVICE — SEALER ENDOSCP NANO COAT OPN DIV CRV L JAW LIGASURE IMPACT

## (undated) DEVICE — ELECTRODE PT RET AD L9FT HI MOIST COND ADH HYDRGEL CORDED

## (undated) DEVICE — Z DISCONTINUED USE 2716239 STAPLER INT STPL 51MM CUT LN L40MM STD TISS CRV CUT CR40B

## (undated) DEVICE — SOLUTION IV IRRIG 250ML ST LF 0.9% SODIUM 2F7122

## (undated) DEVICE — TUBING, SUCTION, 1/4" X 12', STRAIGHT: Brand: MEDLINE

## (undated) DEVICE — NEEDLE HYPO 27GA L15IN REG BVL W O SFTY FOR SYR DISPOSABLE

## (undated) DEVICE — SYRINGE MED 30ML STD CLR PLAS LUERLOCK TIP N CTRL DISP

## (undated) DEVICE — SUTURE ABSORBABLE MONOFILAMENT 5-0 P3 18 IN UD PDS + PDP493G

## (undated) DEVICE — Z DISCONTINUED USE 2272117 DRAPE SURG 3 QTR N INVASIVE 2 LAYR DISP

## (undated) DEVICE — MASTISOL ADHESIVE LIQ 2/3ML

## (undated) DEVICE — BLADE ES ELASTOMERIC COAT INSUL DURABLE BEND UPTO 90DEG

## (undated) DEVICE — ENT I-LF: Brand: MEDLINE INDUSTRIES, INC.

## (undated) DEVICE — GLOVE ORANGE PI 7 1/2   MSG9075

## (undated) DEVICE — GLOVE SURG SZ 65 L12IN FNGR THK79MIL GRN LTX FREE

## (undated) DEVICE — SOLUTION IV IRRIG POUR BRL 0.9% SODIUM CHL 2F7124

## (undated) DEVICE — SUTURE PROL SZ 3-0 L18IN NONABSORBABLE BLU L30MM FS-1 3/8 8663G

## (undated) DEVICE — MAJOR SET UP PK

## (undated) DEVICE — SUTURE VCRL SZ 3-0 L18IN ABSRB UD W/O NDL POLYGLACTIN 910 J110T

## (undated) DEVICE — SUTURE PROL SZ 6-0 L18IN NONABSORBABLE BLU L36MM P-1 3/8 8697G

## (undated) DEVICE — INTENDED FOR TISSUE SEPARATION, AND OTHER PROCEDURES THAT REQUIRE A SHARP SURGICAL BLADE TO PUNCTURE OR CUT.: Brand: BARD-PARKER ® STAINLESS STEEL BLADES

## (undated) DEVICE — STAPLER SKIN H3.9MM WIRE DIA0.58MM CRWN 6.9MM 35 STPL FIX

## (undated) DEVICE — SHEET, T, LAPAROTOMY, STERILE: Brand: MEDLINE

## (undated) DEVICE — TOTAL TRAY, 16FR 10ML SIL FOLEY, URN: Brand: MEDLINE

## (undated) DEVICE — SPONGE,NEURO,1"X3",XR,STRL,LF,10/PK: Brand: MEDLINE

## (undated) DEVICE — SHEET,DRAPE,53X77,STERILE: Brand: MEDLINE

## (undated) DEVICE — NEPTUNE E-SEP SMOKE EVACUATION PENCIL, COATED, 70MM BLADE, PUSH BUTTON SWITCH: Brand: NEPTUNE E-SEP

## (undated) DEVICE — GLOVE ORANGE PI 7   MSG9070

## (undated) DEVICE — CONTAINER,SPECIMEN,PNEU TUBE,3OZ,OR STRL: Brand: MEDLINE